# Patient Record
Sex: MALE | Race: WHITE | Employment: FULL TIME | URBAN - METROPOLITAN AREA
[De-identification: names, ages, dates, MRNs, and addresses within clinical notes are randomized per-mention and may not be internally consistent; named-entity substitution may affect disease eponyms.]

---

## 2017-01-27 RX ORDER — LOSARTAN POTASSIUM AND HYDROCHLOROTHIAZIDE 25; 100 MG/1; MG/1
TABLET ORAL
Qty: 90 TABLET | Refills: 0 | Status: SHIPPED | OUTPATIENT
Start: 2017-01-27 | End: 2017-05-10 | Stop reason: SDUPTHER

## 2017-02-09 ENCOUNTER — TELEPHONE (OUTPATIENT)
Dept: FAMILY MEDICINE CLINIC | Age: 57
End: 2017-02-09

## 2017-02-09 DIAGNOSIS — R73.03 PRE-DIABETES: ICD-10-CM

## 2017-02-09 DIAGNOSIS — I10 ESSENTIAL HYPERTENSION: ICD-10-CM

## 2017-02-09 DIAGNOSIS — E78.00 PURE HYPERCHOLESTEROLEMIA: Primary | ICD-10-CM

## 2017-02-27 DIAGNOSIS — R73.03 PRE-DIABETES: ICD-10-CM

## 2017-02-27 DIAGNOSIS — I10 ESSENTIAL HYPERTENSION: ICD-10-CM

## 2017-02-27 DIAGNOSIS — E78.00 PURE HYPERCHOLESTEROLEMIA: ICD-10-CM

## 2017-02-27 LAB
ANION GAP SERPL CALCULATED.3IONS-SCNC: 14 MMOL/L (ref 3–16)
BUN BLDV-MCNC: 20 MG/DL (ref 7–20)
CALCIUM SERPL-MCNC: 8.9 MG/DL (ref 8.3–10.6)
CHLORIDE BLD-SCNC: 96 MMOL/L (ref 99–110)
CO2: 26 MMOL/L (ref 21–32)
CREAT SERPL-MCNC: 1 MG/DL (ref 0.9–1.3)
GFR AFRICAN AMERICAN: >60
GFR NON-AFRICAN AMERICAN: >60
GLUCOSE BLD-MCNC: 115 MG/DL (ref 70–99)
POTASSIUM SERPL-SCNC: 4.3 MMOL/L (ref 3.5–5.1)
SODIUM BLD-SCNC: 136 MMOL/L (ref 136–145)
TSH REFLEX FT4: 1.57 UIU/ML (ref 0.27–4.2)

## 2017-02-28 LAB
ESTIMATED AVERAGE GLUCOSE: 105.4 MG/DL
HBA1C MFR BLD: 5.3 %

## 2017-03-30 RX ORDER — AMLODIPINE BESYLATE 5 MG/1
TABLET ORAL
Qty: 90 TABLET | Refills: 1 | Status: SHIPPED | OUTPATIENT
Start: 2017-03-30 | End: 2017-09-27 | Stop reason: SDUPTHER

## 2017-04-04 RX ORDER — NAPROXEN 500 MG/1
500 TABLET ORAL 2 TIMES DAILY WITH MEALS
Qty: 180 TABLET | Refills: 1 | Status: SHIPPED | OUTPATIENT
Start: 2017-04-04 | End: 2017-08-11 | Stop reason: ALTCHOICE

## 2017-05-10 RX ORDER — LOSARTAN POTASSIUM AND HYDROCHLOROTHIAZIDE 25; 100 MG/1; MG/1
TABLET ORAL
Qty: 90 TABLET | Refills: 1 | Status: SHIPPED | OUTPATIENT
Start: 2017-05-10 | End: 2017-11-06 | Stop reason: SDUPTHER

## 2017-06-30 DIAGNOSIS — M25.551 RIGHT HIP PAIN: Primary | ICD-10-CM

## 2017-07-09 DIAGNOSIS — E66.01 MORBID OBESITY DUE TO EXCESS CALORIES (HCC): ICD-10-CM

## 2017-07-09 DIAGNOSIS — E78.00 PURE HYPERCHOLESTEROLEMIA: Primary | ICD-10-CM

## 2017-07-09 DIAGNOSIS — R73.03 PRE-DIABETES: ICD-10-CM

## 2017-07-09 DIAGNOSIS — I10 ESSENTIAL HYPERTENSION: ICD-10-CM

## 2017-07-09 DIAGNOSIS — E03.8 SUBCLINICAL HYPOTHYROIDISM: ICD-10-CM

## 2017-08-11 ENCOUNTER — OFFICE VISIT (OUTPATIENT)
Dept: FAMILY MEDICINE CLINIC | Age: 57
End: 2017-08-11

## 2017-08-11 ENCOUNTER — CARE COORDINATION (OUTPATIENT)
Dept: CARE COORDINATION | Age: 57
End: 2017-08-11

## 2017-08-11 VITALS
HEART RATE: 67 BPM | WEIGHT: 315 LBS | TEMPERATURE: 98.7 F | HEIGHT: 71 IN | DIASTOLIC BLOOD PRESSURE: 92 MMHG | OXYGEN SATURATION: 98 % | RESPIRATION RATE: 16 BRPM | BODY MASS INDEX: 44.1 KG/M2 | SYSTOLIC BLOOD PRESSURE: 142 MMHG

## 2017-08-11 DIAGNOSIS — Z01.818 PRE-OP EXAM: Primary | ICD-10-CM

## 2017-08-11 DIAGNOSIS — E78.00 PURE HYPERCHOLESTEROLEMIA: ICD-10-CM

## 2017-08-11 DIAGNOSIS — E03.8 SUBCLINICAL HYPOTHYROIDISM: ICD-10-CM

## 2017-08-11 DIAGNOSIS — E66.01 MORBID OBESITY DUE TO EXCESS CALORIES (HCC): ICD-10-CM

## 2017-08-11 DIAGNOSIS — R73.01 IMPAIRED FASTING GLUCOSE: ICD-10-CM

## 2017-08-11 DIAGNOSIS — I10 ESSENTIAL HYPERTENSION: ICD-10-CM

## 2017-08-11 DIAGNOSIS — R73.03 PRE-DIABETES: ICD-10-CM

## 2017-08-11 LAB — HBA1C MFR BLD: 5.5 %

## 2017-08-11 PROCEDURE — 93000 ELECTROCARDIOGRAM COMPLETE: CPT | Performed by: FAMILY MEDICINE

## 2017-08-11 PROCEDURE — 83036 HEMOGLOBIN GLYCOSYLATED A1C: CPT | Performed by: FAMILY MEDICINE

## 2017-08-11 PROCEDURE — 99242 OFF/OP CONSLTJ NEW/EST SF 20: CPT | Performed by: FAMILY MEDICINE

## 2017-08-11 RX ORDER — TRAMADOL HYDROCHLORIDE 50 MG/1
TABLET ORAL
COMMUNITY
Start: 2017-07-27 | End: 2018-11-21

## 2017-08-11 ASSESSMENT — PATIENT HEALTH QUESTIONNAIRE - PHQ9
SUM OF ALL RESPONSES TO PHQ9 QUESTIONS 1 & 2: 0
2. FEELING DOWN, DEPRESSED OR HOPELESS: 0
SUM OF ALL RESPONSES TO PHQ QUESTIONS 1-9: 0
1. LITTLE INTEREST OR PLEASURE IN DOING THINGS: 0

## 2017-09-27 RX ORDER — AMLODIPINE BESYLATE 5 MG/1
TABLET ORAL
Qty: 90 TABLET | Refills: 1 | Status: SHIPPED | OUTPATIENT
Start: 2017-09-27 | End: 2018-03-26 | Stop reason: SDUPTHER

## 2017-11-06 RX ORDER — LOSARTAN POTASSIUM AND HYDROCHLOROTHIAZIDE 25; 100 MG/1; MG/1
TABLET ORAL
Qty: 90 TABLET | Refills: 1 | Status: SHIPPED | OUTPATIENT
Start: 2017-11-06 | End: 2018-05-05 | Stop reason: SDUPTHER

## 2017-11-06 NOTE — TELEPHONE ENCOUNTER
Last OV  8/11/17    Last refill   5/10/17    # 90      R 1    Lab Results   Component Value Date     02/27/2017    K 4.3 02/27/2017    CL 96 (L) 02/27/2017    CO2 26 02/27/2017    BUN 20 02/27/2017    CREATININE 1.0 02/27/2017    GLUCOSE 115 (H) 02/27/2017    CALCIUM 8.9 02/27/2017    PROT 6.9 10/21/2016    LABALBU 4.6 10/21/2016    BILITOT 1.1 (H) 10/21/2016    ALKPHOS 59 10/21/2016    AST 19 10/21/2016    ALT 30 10/21/2016    LABGLOM >60 02/27/2017    GFRAA >60 02/27/2017    AGRATIO 2.0 10/21/2016    GLOB 2.3 10/21/2016

## 2017-12-04 DIAGNOSIS — E03.8 SUBCLINICAL HYPOTHYROIDISM: ICD-10-CM

## 2017-12-04 RX ORDER — LEVOTHYROXINE SODIUM 0.07 MG/1
TABLET ORAL
Qty: 90 TABLET | Refills: 3 | Status: SHIPPED | OUTPATIENT
Start: 2017-12-04 | End: 2018-11-21 | Stop reason: SDUPTHER

## 2017-12-04 NOTE — TELEPHONE ENCOUNTER
Last OV  08/11/2017 pre op exam   Last Refill 11/23/2016 #90 3 refills  Lab Results   Component Value Date    TSHFT4 1.57 02/27/2017    TSH 4.46 (H) 05/14/2014

## 2018-02-06 ENCOUNTER — TELEPHONE (OUTPATIENT)
Dept: FAMILY MEDICINE CLINIC | Age: 58
End: 2018-02-06

## 2018-02-06 DIAGNOSIS — L98.9 SKIN LESIONS: Primary | ICD-10-CM

## 2018-02-07 NOTE — TELEPHONE ENCOUNTER
[phone] (home) [phone] (work)  [facility] for patient to return our call.

## 2018-03-26 RX ORDER — AMLODIPINE BESYLATE 5 MG/1
TABLET ORAL
Qty: 90 TABLET | Refills: 2 | Status: SHIPPED | OUTPATIENT
Start: 2018-03-26 | End: 2018-11-21 | Stop reason: SDUPTHER

## 2018-05-07 RX ORDER — LOSARTAN POTASSIUM AND HYDROCHLOROTHIAZIDE 25; 100 MG/1; MG/1
TABLET ORAL
Qty: 90 TABLET | Refills: 0 | Status: SHIPPED | OUTPATIENT
Start: 2018-05-07 | End: 2018-08-04 | Stop reason: SDUPTHER

## 2018-08-04 RX ORDER — LOSARTAN POTASSIUM AND HYDROCHLOROTHIAZIDE 25; 100 MG/1; MG/1
TABLET ORAL
Qty: 90 TABLET | Refills: 0 | Status: SHIPPED | OUTPATIENT
Start: 2018-08-04 | End: 2018-11-04 | Stop reason: SDUPTHER

## 2018-08-04 NOTE — TELEPHONE ENCOUNTER
Last refill: 05/07/2018, #90, 0 refills.      Lab Results   Component Value Date     02/27/2017    K 4.3 02/27/2017    CL 96 (L) 02/27/2017    CO2 26 02/27/2017    BUN 20 02/27/2017    CREATININE 1.0 02/27/2017    GLUCOSE 115 (H) 02/27/2017    CALCIUM 8.9 02/27/2017    PROT 6.9 10/21/2016    LABALBU 4.6 10/21/2016    BILITOT 1.1 (H) 10/21/2016    ALKPHOS 59 10/21/2016    AST 19 10/21/2016    ALT 30 10/21/2016    LABGLOM >60 02/27/2017    GFRAA >60 02/27/2017    AGRATIO 2.0 10/21/2016    GLOB 2.3 10/21/2016

## 2018-08-04 NOTE — TELEPHONE ENCOUNTER
Medication and Quantity requested:    losartan-hydrochlorothiazide (HYZAAR) 100-25 MG - qty 90      Last Visit  08/11/17 - Dr Aurora León and phone number updated in EPIC:  yes

## 2018-08-21 NOTE — TELEPHONE ENCOUNTER
[phone] (home) [phone] (work)  Patient informed that he is due for OV. Stated that he will call us back to scheduled.

## 2018-11-05 RX ORDER — LOSARTAN POTASSIUM AND HYDROCHLOROTHIAZIDE 25; 100 MG/1; MG/1
TABLET ORAL
Qty: 90 TABLET | Refills: 3 | Status: SHIPPED | OUTPATIENT
Start: 2018-11-05 | End: 2018-11-21 | Stop reason: SDUPTHER

## 2018-11-21 ENCOUNTER — OFFICE VISIT (OUTPATIENT)
Dept: FAMILY MEDICINE CLINIC | Age: 58
End: 2018-11-21
Payer: COMMERCIAL

## 2018-11-21 VITALS
OXYGEN SATURATION: 99 % | DIASTOLIC BLOOD PRESSURE: 90 MMHG | HEIGHT: 71 IN | SYSTOLIC BLOOD PRESSURE: 140 MMHG | WEIGHT: 315 LBS | BODY MASS INDEX: 44.1 KG/M2 | HEART RATE: 74 BPM

## 2018-11-21 DIAGNOSIS — E78.00 PURE HYPERCHOLESTEROLEMIA: ICD-10-CM

## 2018-11-21 DIAGNOSIS — E03.8 SUBCLINICAL HYPOTHYROIDISM: ICD-10-CM

## 2018-11-21 DIAGNOSIS — E66.01 MORBID OBESITY (HCC): ICD-10-CM

## 2018-11-21 DIAGNOSIS — Z00.00 WELL ADULT EXAM: Primary | ICD-10-CM

## 2018-11-21 DIAGNOSIS — I10 ESSENTIAL HYPERTENSION: ICD-10-CM

## 2018-11-21 DIAGNOSIS — Z00.00 WELL ADULT EXAM: ICD-10-CM

## 2018-11-21 LAB
BILIRUBIN, POC: NEGATIVE
BLOOD URINE, POC: NEGATIVE
CLARITY, POC: CLEAR
COLOR, POC: YELLOW
GLUCOSE URINE, POC: 100
KETONES, POC: NEGATIVE
LEUKOCYTE EST, POC: NEGATIVE
NITRITE, POC: NEGATIVE
PH, POC: 5.5
PROTEIN, POC: NEGATIVE
SPECIFIC GRAVITY, POC: 1.01
UROBILINOGEN, POC: 0.2

## 2018-11-21 PROCEDURE — 81002 URINALYSIS NONAUTO W/O SCOPE: CPT | Performed by: FAMILY MEDICINE

## 2018-11-21 PROCEDURE — 90471 IMMUNIZATION ADMIN: CPT | Performed by: FAMILY MEDICINE

## 2018-11-21 PROCEDURE — 99396 PREV VISIT EST AGE 40-64: CPT | Performed by: FAMILY MEDICINE

## 2018-11-21 PROCEDURE — 90682 RIV4 VACC RECOMBINANT DNA IM: CPT | Performed by: FAMILY MEDICINE

## 2018-11-21 RX ORDER — LEVOTHYROXINE SODIUM 0.07 MG/1
TABLET ORAL
Qty: 90 TABLET | Refills: 3 | Status: SHIPPED | OUTPATIENT
Start: 2018-11-21 | End: 2019-10-24 | Stop reason: SDUPTHER

## 2018-11-21 RX ORDER — LOSARTAN POTASSIUM AND HYDROCHLOROTHIAZIDE 25; 100 MG/1; MG/1
TABLET ORAL
Qty: 90 TABLET | Refills: 3 | Status: SHIPPED | OUTPATIENT
Start: 2018-11-21 | End: 2020-01-06

## 2018-11-21 RX ORDER — AMLODIPINE BESYLATE 5 MG/1
TABLET ORAL
Qty: 90 TABLET | Refills: 2 | Status: SHIPPED | OUTPATIENT
Start: 2018-11-21 | End: 2019-11-20 | Stop reason: SDUPTHER

## 2018-11-21 RX ORDER — SILDENAFIL 100 MG/1
100 TABLET, FILM COATED ORAL PRN
Qty: 5 TABLET | Refills: 3 | Status: SHIPPED | OUTPATIENT
Start: 2018-11-21 | End: 2019-12-26

## 2018-11-21 RX ORDER — KETOCONAZOLE 20 MG/G
CREAM TOPICAL
Qty: 100 G | Refills: 5 | Status: SHIPPED | OUTPATIENT
Start: 2018-11-21 | End: 2020-02-13

## 2018-11-21 ASSESSMENT — PATIENT HEALTH QUESTIONNAIRE - PHQ9
2. FEELING DOWN, DEPRESSED OR HOPELESS: 0
SUM OF ALL RESPONSES TO PHQ9 QUESTIONS 1 & 2: 0
SUM OF ALL RESPONSES TO PHQ QUESTIONS 1-9: 0
SUM OF ALL RESPONSES TO PHQ QUESTIONS 1-9: 0
1. LITTLE INTEREST OR PLEASURE IN DOING THINGS: 0

## 2018-11-21 NOTE — PROGRESS NOTES
Chief Complaint:   Landry Brito is a 66 y.o. male who presents for complete physical examination    History of Present Illness:    Here for cpe  Sees derm-given ketoconazole for tinea cruris and tinea corporis and would like a refill  Feet tingle at time  Ankle/shin with brown pigmentation  Weight is up, does not exercise, stays busy with work however, when he stands for long periods of time he'll get numbness in his left thigh when he systolic goes away, he assumes this is due to nerve entrapment from his prior back issues  Has a long list with numerous minor medical issues to review-including erectile dysfunction, frequent nosebleeds from the right nostril, daily use of Afrin for nasal congestion  Patient Active Problem List   Diagnosis    Hyperlipidemia    Obesity    HTN (hypertension)    Pre-diabetes    Subclinical hypothyroidism    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    Herniated lumbar intervertebral disc     Past Medical History:   Diagnosis Date    Bone marrow donor     x 2    Hyperlipidemia     Hypertension     Lumbar herniated disc     x 3    Obesity        Past Surgical History:   Procedure Laterality Date    COLONOSCOPY  01/16/12    COLONOSCOPY  10/14/14    KNEE SURGERY      OTHER SURGICAL HISTORY      epidural injection x 2       Current Outpatient Prescriptions   Medication Sig Dispense Refill    aspirin 81 MG tablet Take 81 mg by mouth daily      losartan-hydrochlorothiazide (HYZAAR) 100-25 MG per tablet TAKE 1 TABLET DAILY 90 tablet 3    amLODIPine (NORVASC) 5 MG tablet TAKE 1 TABLET NIGHTLY 90 tablet 2    levothyroxine (SYNTHROID) 75 MCG tablet TAKE 1 TABLET DAILY 90 tablet 3    UNABLE TO FIND Take 1 tablet by mouth daily Indications: Tumeric Complex       No current facility-administered medications for this visit.       No Known Allergies    Social History     Social History    Marital status:      Spouse name: N/A    Number of children: intact. Ears: External ears normal. Canals clear. TM's clear bilaterally. Hearing normal to finger rub. Nose/Sinuses: Nares normal. Septum midline. Mucosa normal. No drainage or sinus tenderness. Oropharynx: Lips, mucosa, and tongue normal. Teeth and gums normal. Oropharynx clear with no exudate seen. Neck: Neck supple, and symmetric. No adenopathy. Thyroid symmetric, normal size, without nodule. Trachea is midline. No bruits. Back: Back symmetric, no curvature. ROM normal. No CVA tenderness. Lungs: Good diaphragmatic excursion. Lungs clear to auscultation bilaterally. No retractions or use of accessory muscles. Heart: PMI is not displaced, and no thrill noted. Regular rate and rhythm, with no rub, murmur or gallop noted. Abdomen: Abdomen soft, non-tender. BS normal. No masses, organomegaly. No hernia noted. Extremities: Extremities normal. No deformities, edema, or skin discoloration. No cyanosis or clubbing noted to the nails. Lymph: No lymphadenopathy of the neck or supraclavicular regions. Musculoskeletal: Spine ROM normal. Muscular strength intact. some veinous insufficieny  Peripheral pulses: radial=4/4, dorsalis pedis=4/4,  Neuro: Cranial nerves intact, Gait normal. Reflexes normal and symmetric, with no pathologic reflex noted. No focal weakness. Normal sensation to light touch. Genitalia: Penis normal. No urethral discharge. Scrotum normal to palpation. Marked tinea cruris this chronic bilateral thighs and scrotum   Rectal: Deferred    Results for orders placed or performed in visit on 08/11/17   POCT glycosylated hemoglobin (Hb A1C)   Result Value Ref Range    Hemoglobin A1C 5.5 %     All labs reviewed with patient. ASSESSMENT:   See encounter diagnoses  Encounter Diagnoses   Name Primary?     Subclinical hypothyroidism     Well adult exam Yes    Pure hypercholesterolemia     Essential hypertension     Morbid obesity (HCC)    Tinea cruris  Tinea corporis  veinous insuff  Rhinitis

## 2018-11-22 LAB
A/G RATIO: 2 (ref 1.1–2.2)
ALBUMIN SERPL-MCNC: 4.7 G/DL (ref 3.4–5)
ALP BLD-CCNC: 62 U/L (ref 40–129)
ALT SERPL-CCNC: 25 U/L (ref 10–40)
ANION GAP SERPL CALCULATED.3IONS-SCNC: 13 MMOL/L (ref 3–16)
AST SERPL-CCNC: 16 U/L (ref 15–37)
BASOPHILS ABSOLUTE: 0 K/UL (ref 0–0.2)
BASOPHILS RELATIVE PERCENT: 0.4 %
BILIRUB SERPL-MCNC: 1 MG/DL (ref 0–1)
BUN BLDV-MCNC: 17 MG/DL (ref 7–20)
CALCIUM SERPL-MCNC: 9.8 MG/DL (ref 8.3–10.6)
CHLORIDE BLD-SCNC: 100 MMOL/L (ref 99–110)
CHOLESTEROL, TOTAL: 179 MG/DL (ref 0–199)
CO2: 28 MMOL/L (ref 21–32)
CREAT SERPL-MCNC: 1.1 MG/DL (ref 0.9–1.3)
EOSINOPHILS ABSOLUTE: 0.1 K/UL (ref 0–0.6)
EOSINOPHILS RELATIVE PERCENT: 1.4 %
ESTIMATED AVERAGE GLUCOSE: 114 MG/DL
GFR AFRICAN AMERICAN: >60
GFR NON-AFRICAN AMERICAN: >60
GLOBULIN: 2.4 G/DL
GLUCOSE BLD-MCNC: 105 MG/DL (ref 70–99)
HBA1C MFR BLD: 5.6 %
HCT VFR BLD CALC: 48.1 % (ref 40.5–52.5)
HDLC SERPL-MCNC: 43 MG/DL (ref 40–60)
HEMOGLOBIN: 16.2 G/DL (ref 13.5–17.5)
LDL CHOLESTEROL CALCULATED: 108 MG/DL
LYMPHOCYTES ABSOLUTE: 1.1 K/UL (ref 1–5.1)
LYMPHOCYTES RELATIVE PERCENT: 13.5 %
MCH RBC QN AUTO: 31 PG (ref 26–34)
MCHC RBC AUTO-ENTMCNC: 33.7 G/DL (ref 31–36)
MCV RBC AUTO: 91.9 FL (ref 80–100)
MONOCYTES ABSOLUTE: 0.5 K/UL (ref 0–1.3)
MONOCYTES RELATIVE PERCENT: 6.4 %
NEUTROPHILS ABSOLUTE: 6.2 K/UL (ref 1.7–7.7)
NEUTROPHILS RELATIVE PERCENT: 78.3 %
PDW BLD-RTO: 13.8 % (ref 12.4–15.4)
PLATELET # BLD: 168 K/UL (ref 135–450)
PMV BLD AUTO: 8.3 FL (ref 5–10.5)
POTASSIUM SERPL-SCNC: 4.4 MMOL/L (ref 3.5–5.1)
PROSTATE SPECIFIC ANTIGEN: 0.88 NG/ML (ref 0–4)
RBC # BLD: 5.24 M/UL (ref 4.2–5.9)
SODIUM BLD-SCNC: 141 MMOL/L (ref 136–145)
TOTAL PROTEIN: 7.1 G/DL (ref 6.4–8.2)
TRIGL SERPL-MCNC: 139 MG/DL (ref 0–150)
TSH REFLEX: 1.72 UIU/ML (ref 0.27–4.2)
VLDLC SERPL CALC-MCNC: 28 MG/DL
WBC # BLD: 7.9 K/UL (ref 4–11)

## 2019-03-21 RX ORDER — CEPHALEXIN 500 MG/1
500 CAPSULE ORAL 3 TIMES DAILY
Qty: 30 CAPSULE | Refills: 0 | Status: SHIPPED | OUTPATIENT
Start: 2019-03-21 | End: 2021-02-09 | Stop reason: ALTCHOICE

## 2019-09-05 ENCOUNTER — HOSPITAL ENCOUNTER (EMERGENCY)
Age: 59
Discharge: HOME | End: 2019-09-05
Attending: EMERGENCY MEDICINE
Payer: COMMERCIAL

## 2019-09-05 ENCOUNTER — TELEPHONE (OUTPATIENT)
Dept: FAMILY MEDICINE CLINIC | Age: 59
End: 2019-09-05

## 2019-09-05 ENCOUNTER — APPOINTMENT (OUTPATIENT)
Dept: CT IMAGING | Age: 59
End: 2019-09-05
Payer: COMMERCIAL

## 2019-09-05 VITALS
SYSTOLIC BLOOD PRESSURE: 146 MMHG | TEMPERATURE: 97.5 F | DIASTOLIC BLOOD PRESSURE: 82 MMHG | OXYGEN SATURATION: 99 % | RESPIRATION RATE: 17 BRPM | HEIGHT: 72 IN | BODY MASS INDEX: 42.66 KG/M2 | HEART RATE: 67 BPM | WEIGHT: 315 LBS

## 2019-09-05 DIAGNOSIS — N20.1 URETEROLITHIASIS: ICD-10-CM

## 2019-09-05 DIAGNOSIS — N13.30 HYDRONEPHROSIS, UNSPECIFIED HYDRONEPHROSIS TYPE: Primary | ICD-10-CM

## 2019-09-05 DIAGNOSIS — N39.0 URINARY TRACT INFECTION IN MALE: ICD-10-CM

## 2019-09-05 DIAGNOSIS — R10.9 FLANK PAIN: ICD-10-CM

## 2019-09-05 LAB
A/G RATIO: 1.5 (ref 1.1–2.2)
ALBUMIN SERPL-MCNC: 4.4 G/DL (ref 3.4–5)
ALP BLD-CCNC: 56 U/L (ref 40–129)
ALT SERPL-CCNC: 29 U/L (ref 10–40)
ANION GAP SERPL CALCULATED.3IONS-SCNC: 10 MMOL/L (ref 3–16)
AST SERPL-CCNC: 18 U/L (ref 15–37)
BASOPHILS ABSOLUTE: 0 K/UL (ref 0–0.2)
BASOPHILS RELATIVE PERCENT: 0.4 %
BILIRUB SERPL-MCNC: 0.8 MG/DL (ref 0–1)
BILIRUBIN URINE: NEGATIVE
BLOOD, URINE: ABNORMAL
BUN BLDV-MCNC: 23 MG/DL (ref 7–20)
CALCIUM SERPL-MCNC: 10.1 MG/DL (ref 8.3–10.6)
CHLORIDE BLD-SCNC: 100 MMOL/L (ref 99–110)
CLARITY: ABNORMAL
CO2: 26 MMOL/L (ref 21–32)
COLOR: YELLOW
CREAT SERPL-MCNC: 1.3 MG/DL (ref 0.9–1.3)
EOSINOPHILS ABSOLUTE: 0 K/UL (ref 0–0.6)
EOSINOPHILS RELATIVE PERCENT: 0.2 %
EPITHELIAL CELLS, UA: 0 /HPF (ref 0–5)
GFR AFRICAN AMERICAN: >60
GFR NON-AFRICAN AMERICAN: 57
GLOBULIN: 3 G/DL
GLUCOSE BLD-MCNC: 160 MG/DL (ref 70–99)
GLUCOSE URINE: 100 MG/DL
HCT VFR BLD CALC: 46.4 % (ref 40.5–52.5)
HEMOGLOBIN: 16 G/DL (ref 13.5–17.5)
HYALINE CASTS: 3 /LPF (ref 0–8)
KETONES, URINE: NEGATIVE MG/DL
LACTIC ACID, SEPSIS: 2.1 MMOL/L (ref 0.4–1.9)
LACTIC ACID, SEPSIS: 2.3 MMOL/L (ref 0.4–1.9)
LEUKOCYTE ESTERASE, URINE: NEGATIVE
LYMPHOCYTES ABSOLUTE: 0.5 K/UL (ref 1–5.1)
LYMPHOCYTES RELATIVE PERCENT: 6.2 %
MCH RBC QN AUTO: 31.5 PG (ref 26–34)
MCHC RBC AUTO-ENTMCNC: 34.6 G/DL (ref 31–36)
MCV RBC AUTO: 91.2 FL (ref 80–100)
MICROSCOPIC EXAMINATION: YES
MONOCYTES ABSOLUTE: 0.4 K/UL (ref 0–1.3)
MONOCYTES RELATIVE PERCENT: 5 %
NEUTROPHILS ABSOLUTE: 7.7 K/UL (ref 1.7–7.7)
NEUTROPHILS RELATIVE PERCENT: 88.2 %
NITRITE, URINE: NEGATIVE
PDW BLD-RTO: 13 % (ref 12.4–15.4)
PH UA: 5 (ref 5–8)
PLATELET # BLD: 181 K/UL (ref 135–450)
PMV BLD AUTO: 7.3 FL (ref 5–10.5)
POTASSIUM SERPL-SCNC: 4.2 MMOL/L (ref 3.5–5.1)
PROTEIN UA: 100 MG/DL
RBC # BLD: 5.08 M/UL (ref 4.2–5.9)
RBC UA: 40 /HPF (ref 0–4)
SODIUM BLD-SCNC: 136 MMOL/L (ref 136–145)
SPECIFIC GRAVITY UA: 1.03 (ref 1–1.03)
TOTAL PROTEIN: 7.4 G/DL (ref 6.4–8.2)
URINE REFLEX TO CULTURE: ABNORMAL
URINE TYPE: ABNORMAL
UROBILINOGEN, URINE: 0.2 E.U./DL
WBC # BLD: 8.7 K/UL (ref 4–11)
WBC UA: 2 /HPF (ref 0–5)

## 2019-09-05 PROCEDURE — 96374 THER/PROPH/DIAG INJ IV PUSH: CPT

## 2019-09-05 PROCEDURE — 87040 BLOOD CULTURE FOR BACTERIA: CPT

## 2019-09-05 PROCEDURE — 6360000002 HC RX W HCPCS: Performed by: NURSE PRACTITIONER

## 2019-09-05 PROCEDURE — 74176 CT ABD & PELVIS W/O CONTRAST: CPT

## 2019-09-05 PROCEDURE — 6370000000 HC RX 637 (ALT 250 FOR IP): Performed by: NURSE PRACTITIONER

## 2019-09-05 PROCEDURE — 80053 COMPREHEN METABOLIC PANEL: CPT

## 2019-09-05 PROCEDURE — 81001 URINALYSIS AUTO W/SCOPE: CPT

## 2019-09-05 PROCEDURE — 96361 HYDRATE IV INFUSION ADD-ON: CPT

## 2019-09-05 PROCEDURE — 83605 ASSAY OF LACTIC ACID: CPT

## 2019-09-05 PROCEDURE — 2580000003 HC RX 258: Performed by: NURSE PRACTITIONER

## 2019-09-05 PROCEDURE — 85025 COMPLETE CBC W/AUTO DIFF WBC: CPT

## 2019-09-05 PROCEDURE — 99284 EMERGENCY DEPT VISIT MOD MDM: CPT

## 2019-09-05 PROCEDURE — 36415 COLL VENOUS BLD VENIPUNCTURE: CPT

## 2019-09-05 RX ORDER — 0.9 % SODIUM CHLORIDE 0.9 %
1000 INTRAVENOUS SOLUTION INTRAVENOUS ONCE
Status: COMPLETED | OUTPATIENT
Start: 2019-09-05 | End: 2019-09-05

## 2019-09-05 RX ORDER — KETOROLAC TROMETHAMINE 30 MG/ML
30 INJECTION, SOLUTION INTRAMUSCULAR; INTRAVENOUS ONCE
Status: COMPLETED | OUTPATIENT
Start: 2019-09-05 | End: 2019-09-05

## 2019-09-05 RX ORDER — CIPROFLOXACIN 500 MG/1
500 TABLET, FILM COATED ORAL 2 TIMES DAILY
Qty: 20 TABLET | Refills: 0 | Status: SHIPPED | OUTPATIENT
Start: 2019-09-05 | End: 2019-09-15

## 2019-09-05 RX ORDER — HYDROCODONE BITARTRATE AND ACETAMINOPHEN 5; 325 MG/1; MG/1
1 TABLET ORAL EVERY 6 HOURS PRN
Qty: 10 TABLET | Refills: 0 | Status: SHIPPED | OUTPATIENT
Start: 2019-09-05 | End: 2019-09-08

## 2019-09-05 RX ORDER — PHENAZOPYRIDINE HYDROCHLORIDE 100 MG/1
200 TABLET, FILM COATED ORAL ONCE
Status: COMPLETED | OUTPATIENT
Start: 2019-09-05 | End: 2019-09-05

## 2019-09-05 RX ORDER — PHENAZOPYRIDINE HYDROCHLORIDE 200 MG/1
200 TABLET, FILM COATED ORAL 3 TIMES DAILY PRN
Qty: 10 TABLET | Refills: 0 | Status: SHIPPED | OUTPATIENT
Start: 2019-09-05 | End: 2019-09-08

## 2019-09-05 RX ORDER — TAMSULOSIN HYDROCHLORIDE 0.4 MG/1
0.4 CAPSULE ORAL DAILY
Qty: 5 CAPSULE | Refills: 0 | Status: SHIPPED | OUTPATIENT
Start: 2019-09-05 | End: 2020-02-13

## 2019-09-05 RX ORDER — BACITRACIN, NEOMYCIN, POLYMYXIN B 400; 3.5; 5 [USP'U]/G; MG/G; [USP'U]/G
OINTMENT TOPICAL
Status: DISCONTINUED
Start: 2019-09-05 | End: 2019-09-05 | Stop reason: HOSPADM

## 2019-09-05 RX ADMIN — KETOROLAC TROMETHAMINE 30 MG: 30 INJECTION, SOLUTION INTRAMUSCULAR at 11:25

## 2019-09-05 RX ADMIN — SODIUM CHLORIDE 1000 ML: 9 INJECTION, SOLUTION INTRAVENOUS at 11:25

## 2019-09-05 RX ADMIN — PHENAZOPYRIDINE 200 MG: 100 TABLET ORAL at 11:25

## 2019-09-05 ASSESSMENT — PAIN SCALES - GENERAL
PAINLEVEL_OUTOF10: 0
PAINLEVEL_OUTOF10: 8

## 2019-09-05 ASSESSMENT — ENCOUNTER SYMPTOMS
CHEST TIGHTNESS: 0
VOMITING: 0
NAUSEA: 0
SHORTNESS OF BREATH: 0
ABDOMINAL PAIN: 0
DIARRHEA: 0

## 2019-09-05 ASSESSMENT — PAIN DESCRIPTION - LOCATION: LOCATION: BACK

## 2019-09-05 ASSESSMENT — PAIN DESCRIPTION - PAIN TYPE: TYPE: ACUTE PAIN

## 2019-09-05 NOTE — ED NOTES
Bed: 19  Expected date:   Expected time:   Means of arrival:   Comments:  [address] [id number], [facility], RN  09/05/19 3748

## 2019-09-05 NOTE — ED PROVIDER NOTES
and time. Skin: Skin is warm and dry. He is not diaphoretic. No pallor. Psychiatric: He has a normal mood and affect. His behavior is normal.   Nursing note and vitals reviewed.       DIAGNOSTIC RESULTS   LABS:    Labs Reviewed   CBC WITH AUTO DIFFERENTIAL - Abnormal; Notable for the following components:       Result Value    Lymphocytes Absolute 0.5 (*)     All other components within normal limits    Narrative:     Performed at:  [facility] [address]   Phone [phone]   COMPREHENSIVE METABOLIC PANEL - Abnormal; Notable for the following components:    Glucose 160 (*)     BUN 23 (*)     GFR Non- 57 (*)     All other components within normal limits    Narrative:     Performed at:  [facility] [address]   Phone [phone]   LACTATE, SEPSIS - Abnormal; Notable for the following components:    Lactic Acid, Sepsis 2.3 (*)     All other components within normal limits    Narrative:     Performed at:  [facility] [address]   Phone [phone]   LACTATE, SEPSIS - Abnormal; Notable for the following components:    Lactic Acid, Sepsis 2.1 (*)     All other components within normal limits    Narrative:     Performed at:  [facility] [address]   Phone [phone]   URINE RT REFLEX TO CULTURE - Abnormal; Notable for the following components:    Clarity, UA TURBID (*)     Glucose, Ur 100 (*)     Blood, Urine LARGE (*)     Protein,  (*)     All other components within normal limits    Narrative:     Performed at:  [facility] [address]   Phone [phone]   MICROSCOPIC URINALYSIS - Abnormal; Notable for the following components:    RBC, UA 40 (*)     All other components within normal limits    Narrative:     Performed at:  [facility]  [address], [address]   Phone [phone]   CULTURE BLOOD #2   CULTURE BLOOD #1       All other labs were within normal range or not returned as of this dictation. EKG: All EKG's are interpreted by the Emergency Department Physician in the absence of a cardiologist.  Please see their note for interpretation of EKG. RADIOLOGY:   Non-plain film images such as CT, Ultrasound and MRI are read by the radiologist. Plain radiographic images are visualized andpreliminarily interpreted by the  ED Provider with the below findings:        Interpretation Formerly named [facility] Radiologist below, if available at the time of this note:    CT ABDOMEN PELVIS WO CONTRAST Additional Contrast? None   Final Result   Punctate stone in the distal right ureter. Mild right hydronephrosis. No results found. PROCEDURES   Unless otherwise noted below, none     Procedures    CRITICAL CARE TIME   N/A    CONSULTS:  None      EMERGENCY DEPARTMENT COURSE and DIFFERENTIAL DIAGNOSIS/MDM:   Vitals:    Vitals:    09/05/19 0959 09/05/19 1346   BP: (!) 228/108 (!) 146/82   Pulse: 71 67   Resp: 17    Temp: 97.5 °F (36.4 °C)    SpO2: 99%    Weight: (!) 390 lb (176.9 kg)    Height: 6' (1.829 m)        Patient was given thefollowing medications:  Medications   neomycin-bacitracin-polymyxin (NEOSPORIN) 400-5-5000 ointment (has no administration in time range)   phenazopyridine (PYRIDIUM) tablet 200 mg (200 mg Oral Given 9/5/19 1125)   ketorolac (TORADOL) injection 30 mg (30 mg Intravenous Given 9/5/19 1125)   0.9 % sodium chloride bolus (0 mLs Intravenous Stopped 9/5/19 1346)     Briefly, this is a 64year old male that  presents the emergency department with complaint of urinary hesitancy frequency and urgency with painful urination, right-sided flank pain since last night. Reports that symptoms have waxed and waned.   Denies history of previous

## 2019-09-05 NOTE — ED NOTES
Pt resting in bed. wife at bedside. denies pain. No signs of distress. Regular respiratory pattern, normal respiratory depth, unlabored respirations. Bed in lowest position, 2/2 bedrails up, bedside table within reach, bed wheels locked. Denies any needs at this time. Call light in reach. Will continue to monitor.            Kirti Garcia RN  09/05/19 8334

## 2019-09-10 LAB
BLOOD CULTURE, ROUTINE: NORMAL
CULTURE, BLOOD 2: NORMAL

## 2019-10-24 DIAGNOSIS — E03.8 SUBCLINICAL HYPOTHYROIDISM: ICD-10-CM

## 2019-10-24 RX ORDER — LEVOTHYROXINE SODIUM 0.07 MG/1
TABLET ORAL
Qty: 90 TABLET | Refills: 0 | Status: SHIPPED | OUTPATIENT
Start: 2019-10-24 | End: 2020-01-22

## 2019-11-11 DIAGNOSIS — M25.552 PAIN OF LEFT HIP JOINT: Primary | ICD-10-CM

## 2019-11-20 RX ORDER — AMLODIPINE BESYLATE 5 MG/1
TABLET ORAL
Qty: 90 TABLET | Refills: 0 | Status: ON HOLD | OUTPATIENT
Start: 2019-11-20 | End: 2022-01-02 | Stop reason: DRUGHIGH

## 2019-12-26 RX ORDER — SILDENAFIL 100 MG/1
TABLET, FILM COATED ORAL
Qty: 5 TABLET | Refills: 2 | Status: SHIPPED | OUTPATIENT
Start: 2019-12-26 | End: 2020-02-13 | Stop reason: SDUPTHER

## 2020-01-06 RX ORDER — LOSARTAN POTASSIUM AND HYDROCHLOROTHIAZIDE 25; 100 MG/1; MG/1
TABLET ORAL
Qty: 30 TABLET | Refills: 0 | Status: SHIPPED | OUTPATIENT
Start: 2020-01-06 | End: 2020-02-13 | Stop reason: SDUPTHER

## 2020-01-06 NOTE — TELEPHONE ENCOUNTER
Medication:   Requested Prescriptions     Pending Prescriptions Disp Refills    losartan-hydrochlorothiazide (HYZAAR) 100-25 MG per tablet [Pharmacy Med Name: LOSARTAN/HCTZ TABS 100/25] 90 tablet 4     Sig: TAKE 1 TABLET DAILY       Last Filled:  11/21/18 #90, 3 RF     Patient Phone Number: [phone] (home) [phone] (work)    Last appt: 11/21/2018 physical   Next appt: Visit date not found - left message on machine, please schedule appointment when patient returns call.     Lab Results   Component Value Date     09/05/2019    K 4.2 09/05/2019     09/05/2019    CO2 26 09/05/2019    BUN 23 (H) 09/05/2019    CREATININE 1.3 09/05/2019    GLUCOSE 160 (H) 09/05/2019    CALCIUM 10.1 09/05/2019    PROT 7.4 09/05/2019    LABALBU 4.4 09/05/2019    BILITOT 0.8 09/05/2019    ALKPHOS 56 09/05/2019    AST 18 09/05/2019    ALT 29 09/05/2019    LABGLOM 57 (A) 09/05/2019    GFRAA >60 09/05/2019    AGRATIO 1.5 09/05/2019    GLOB 3.0 09/05/2019

## 2020-01-22 RX ORDER — LEVOTHYROXINE SODIUM 0.07 MG/1
TABLET ORAL
Qty: 90 TABLET | Refills: 0 | Status: SHIPPED | OUTPATIENT
Start: 2020-01-22 | End: 2020-04-21

## 2020-01-22 NOTE — TELEPHONE ENCOUNTER
Medication:   Requested Prescriptions     Pending Prescriptions Disp Refills    levothyroxine (SYNTHROID) 75 MCG tablet [Pharmacy Med Name: L-THYROXINE (SYNTHROID) TABS 75MCG] 90 tablet 4     Sig: TAKE 1 TABLET DAILY       Last Filled:  10/24/19 #90, 0 RF     Patient Phone Number: [phone] (home) [phone] (work)    Last appt: 11/21/2018 physical   Next appt: 2/13/2020 with Dr. Agnes Geronimo     Last Thyroid:   TSH 11/22/18 1.72

## 2020-02-13 ENCOUNTER — TELEPHONE (OUTPATIENT)
Dept: FAMILY MEDICINE CLINIC | Age: 60
End: 2020-02-13

## 2020-02-13 ENCOUNTER — OFFICE VISIT (OUTPATIENT)
Dept: FAMILY MEDICINE CLINIC | Age: 60
End: 2020-02-13
Payer: COMMERCIAL

## 2020-02-13 VITALS
HEIGHT: 72 IN | TEMPERATURE: 97.7 F | OXYGEN SATURATION: 96 % | SYSTOLIC BLOOD PRESSURE: 132 MMHG | HEART RATE: 76 BPM | BODY MASS INDEX: 42.66 KG/M2 | WEIGHT: 315 LBS | DIASTOLIC BLOOD PRESSURE: 90 MMHG

## 2020-02-13 DIAGNOSIS — E03.9 ACQUIRED HYPOTHYROIDISM: ICD-10-CM

## 2020-02-13 DIAGNOSIS — I10 ESSENTIAL HYPERTENSION: ICD-10-CM

## 2020-02-13 DIAGNOSIS — Z12.5 PROSTATE CANCER SCREENING: ICD-10-CM

## 2020-02-13 LAB
BILIRUBIN, POC: NORMAL
BLOOD URINE, POC: NORMAL
CLARITY, POC: CLEAR
COLOR, POC: YELLOW
GLUCOSE URINE, POC: NORMAL
KETONES, POC: NORMAL
LEUKOCYTE EST, POC: NORMAL
NITRITE, POC: NORMAL
PH, POC: 7
PROTEIN, POC: NORMAL
SPECIFIC GRAVITY, POC: 1.02
UROBILINOGEN, POC: 0.2

## 2020-02-13 PROCEDURE — 99396 PREV VISIT EST AGE 40-64: CPT | Performed by: FAMILY MEDICINE

## 2020-02-13 PROCEDURE — 81002 URINALYSIS NONAUTO W/O SCOPE: CPT | Performed by: FAMILY MEDICINE

## 2020-02-13 RX ORDER — SILDENAFIL 100 MG/1
TABLET, FILM COATED ORAL
Qty: 10 TABLET | Refills: 2 | Status: SHIPPED | OUTPATIENT
Start: 2020-02-13 | End: 2021-02-09

## 2020-02-13 RX ORDER — LOSARTAN POTASSIUM AND HYDROCHLOROTHIAZIDE 25; 100 MG/1; MG/1
TABLET ORAL
Qty: 90 TABLET | Refills: 3 | Status: SHIPPED | OUTPATIENT
Start: 2020-02-13 | End: 2021-01-18

## 2020-02-13 RX ORDER — AMLODIPINE BESYLATE 10 MG/1
10 TABLET ORAL DAILY
Qty: 90 TABLET | Refills: 3 | Status: SHIPPED | OUTPATIENT
Start: 2020-02-13 | End: 2021-01-20

## 2020-02-13 ASSESSMENT — PATIENT HEALTH QUESTIONNAIRE - PHQ9
SUM OF ALL RESPONSES TO PHQ QUESTIONS 1-9: 0
SUM OF ALL RESPONSES TO PHQ QUESTIONS 1-9: 0
2. FEELING DOWN, DEPRESSED OR HOPELESS: 0
SUM OF ALL RESPONSES TO PHQ9 QUESTIONS 1 & 2: 0
1. LITTLE INTEREST OR PLEASURE IN DOING THINGS: 0

## 2020-02-13 NOTE — PROGRESS NOTES
Transportation needs:     Medical: None     Non-medical: None   Tobacco Use    Smoking status: Never Smoker    Smokeless tobacco: Never Used   Substance and Sexual Activity    Alcohol use:  Yes     Alcohol/week: 4.0 - 5.0 standard drinks     Types: 4 - 5 Cans of beer per week     Comment: occasional    Drug use: No    Sexual activity: Yes     Partners: Female   Lifestyle    Physical activity:     Days per week: None     Minutes per session: None    Stress: None   Relationships    Social connections:     Talks on phone: None     Gets together: None     Attends Denominational service: None     Active member of club or organization: None     Attends meetings of clubs or organizations: None     Relationship status: None    Intimate partner violence:     Fear of current or ex partner: None     Emotionally abused: None     Physically abused: None     Forced sexual activity: None   Other Topics Concern    None   Social History Narrative    None     Family History   Problem Relation Age of Onset    High Blood Pressure Mother     High Blood Pressure Father     Cancer Father         prostate    Cancer Sister         Lorrine Ferdinand    Cancer Sister         leukemia                            Review Of Systems  Skin: no abnormal pigmentation, rash, scaling, itching, masses, hair or nail changes  Eyes: no blurring, diplopia, or eye pain  Ears/Nose/Throat: no hearing loss, tinnitus, vertigo, nosebleed, nasal congestion, rhinorrhea, sore throat  Respiratory: no cough, pleuritic chest pain, dyspnea, or wheezing  Cardiovascular: no angina, GUPTA, orthopnea, PND, palpitations, or claudication  Gastrointestinal: no nausea, vomiting, heartburn, diarrhea, constipation, bloating, or abdominal pain  Genitourinary: no urinary urgency, frequency, dysuria, nocturia, hesitancy, or incontinence  Musculoskeletal: no arthritis, arthralgia, myalgia, weakness, or morning stiffness  Neurologic: no paralysis, paresis, paresthesia, seizures, tremors, or headaches  Hematologic/Lymphatic/Immunologic: no anemia, abnormal bleeding/bruising, fever, chills, night sweats, swollen glands, or unexplained weight loss  Endocrine: no heat or cold intolerance and no polyphagia, polydipsia, or polyuria    PHYSICAL EXAMINATION:  BP (!) 160/92   Pulse 76   Temp 97.7 °F (36.5 °C) (Tympanic)   Ht 5' 11.85\" (1.825 m)   Wt (!) 388 lb (176 kg)   SpO2 96%   BMI 52.84 kg/m²   General appearance: healthy, alert, no distress  Skin: Skin color, texture, turgor normal. No rashes or lesions. No induration or tightening palpated. Head: Normocephalic. No masses, lesions, tenderness or abnormalities  Eyes: conjunctivae/corneas clear. PERRL, EOM's intact. Ears: External ears normal. Canals clear. TM's clear bilaterally. Hearing normal to finger rub. Nose/Sinuses: Nares normal. Septum midline. Mucosa normal. No drainage or sinus tenderness. Oropharynx: Lips, mucosa, and tongue normal. Teeth and gums normal. Oropharynx clear with no exudate seen. Neck: Neck supple, and symmetric. No adenopathy. Thyroid symmetric, normal size, without nodule. Trachea is midline. No bruits. Back: Back symmetric, no curvature. ROM normal. No CVA tenderness. Lungs: Good diaphragmatic excursion. Lungs clear to auscultation bilaterally. No retractions or use of accessory muscles. Heart: PMI is not displaced, and no thrill noted. Regular rate and rhythm, with no rub, murmur or gallop noted. Abdomen: Abdomen soft, non-tender. BS normal. No masses, organomegaly. No hernia noted. Extremities: Extremities normal. No deformities,  or skin discoloration. No cyanosis or clubbing noted to the nails. 1+ p;itting ankle edema bilat  Lymph: No lymphadenopathy of the neck or supraclavicular regions. Musculoskeletal: Spine ROM normal. Muscular strength intact.   Peripheral pulses: radial=4/4, dorsalis pedis=4/4,  Neuro: Cranial nerves intact, Gait normal. Reflexes normal and symmetric, with no Sepsis 2.1 (H) 0.4 - 1.9 mmol/L   Urine, reflex to culture   Result Value Ref Range    Color, UA YELLOW Straw/Yellow    Clarity, UA TURBID (A) Clear    Glucose, Ur 100 (A) Negative mg/dL    Bilirubin Urine Negative Negative    Ketones, Urine Negative Negative mg/dL    Specific Gravity, UA 1.028 1.005 - 1.030    Blood, Urine LARGE (A) Negative    pH, UA 5.0 5.0 - 8.0    Protein,  (A) Negative mg/dL    Urobilinogen, Urine 0.2 <2.0 E.U./dL    Nitrite, Urine Negative Negative    Leukocyte Esterase, Urine Negative Negative    Microscopic Examination YES     Urine Reflex to Culture Not Indicated     Urine Type Not Specified    Microscopic Urinalysis   Result Value Ref Range    Hyaline Casts, UA 3 0 - 8 /LPF    WBC, UA 2 0 - 5 /HPF    RBC, UA 40 (H) 0 - 4 /HPF    Epi Cells 0 0 - 5 /HPF     All labs reviewed with patient. ASSESSMENT:   See encounter diagnoses  Encounter Diagnoses   Name Primary?  Acquired hypothyroidism  repeat TSH    Essential hypertension  slightly elevated, suggest increasing amlodipine from 5 to 10 mg    Prostate cancer screening  check PSA    Pre-diabetes  check A1c    Class 3 severe obesity due to excess calories with serious comorbidity and body mass index (BMI) of 40.0 to 44.9 in adult [facility])  lose 25 pounds    Pure hypercholesterolemia  check lipid panel    Herniated lumbar intervertebral disc  stable currently   Had flu and first shingrix at [employer]:   See orders and medications filed with this encounter.   Labs today  Get second shingrix  Due for colonoscopy  Inc norvas form 5 to 10  Recheck bp in 6 weeks  Call in thyroid refill after labs

## 2020-02-14 LAB
ANION GAP SERPL CALCULATED.3IONS-SCNC: 13 MMOL/L (ref 3–16)
BUN BLDV-MCNC: 21 MG/DL (ref 7–20)
CALCIUM SERPL-MCNC: 9.5 MG/DL (ref 8.3–10.6)
CHLORIDE BLD-SCNC: 99 MMOL/L (ref 99–110)
CHOLESTEROL, TOTAL: 180 MG/DL (ref 0–199)
CO2: 28 MMOL/L (ref 21–32)
CREAT SERPL-MCNC: 1.1 MG/DL (ref 0.9–1.3)
ESTIMATED AVERAGE GLUCOSE: 122.6 MG/DL
GFR AFRICAN AMERICAN: >60
GFR NON-AFRICAN AMERICAN: >60
GLUCOSE BLD-MCNC: 97 MG/DL (ref 70–99)
HBA1C MFR BLD: 5.9 %
HDLC SERPL-MCNC: 42 MG/DL (ref 40–60)
LDL CHOLESTEROL CALCULATED: 106 MG/DL
POTASSIUM SERPL-SCNC: 4.8 MMOL/L (ref 3.5–5.1)
PROSTATE SPECIFIC ANTIGEN: 0.46 NG/ML (ref 0–4)
SODIUM BLD-SCNC: 140 MMOL/L (ref 136–145)
TRIGL SERPL-MCNC: 161 MG/DL (ref 0–150)
TSH SERPL DL<=0.05 MIU/L-ACNC: 1.79 UIU/ML (ref 0.27–4.2)
VLDLC SERPL CALC-MCNC: 32 MG/DL

## 2020-04-15 ENCOUNTER — TELEPHONE (OUTPATIENT)
Dept: FAMILY MEDICINE CLINIC | Age: 60
End: 2020-04-15

## 2020-04-21 RX ORDER — LEVOTHYROXINE SODIUM 0.07 MG/1
TABLET ORAL
Qty: 90 TABLET | Refills: 2 | Status: SHIPPED | OUTPATIENT
Start: 2020-04-21 | End: 2021-01-18

## 2020-04-23 ENCOUNTER — NURSE ONLY (OUTPATIENT)
Dept: FAMILY MEDICINE CLINIC | Age: 60
End: 2020-04-23

## 2020-04-23 VITALS — DIASTOLIC BLOOD PRESSURE: 80 MMHG | TEMPERATURE: 98.4 F | SYSTOLIC BLOOD PRESSURE: 138 MMHG

## 2020-10-13 ENCOUNTER — HOSPITAL ENCOUNTER (OUTPATIENT)
Dept: GENERAL RADIOLOGY | Age: 60
Discharge: HOME | End: 2020-10-13
Payer: COMMERCIAL

## 2020-10-13 PROCEDURE — 72170 X-RAY EXAM OF PELVIS: CPT

## 2020-10-13 PROCEDURE — 72100 X-RAY EXAM L-S SPINE 2/3 VWS: CPT

## 2021-01-16 DIAGNOSIS — E03.8 SUBCLINICAL HYPOTHYROIDISM: ICD-10-CM

## 2021-01-18 RX ORDER — LOSARTAN POTASSIUM AND HYDROCHLOROTHIAZIDE 25; 100 MG/1; MG/1
TABLET ORAL
Qty: 90 TABLET | Refills: 0 | Status: SHIPPED | OUTPATIENT
Start: 2021-01-18 | End: 2021-04-16

## 2021-01-18 RX ORDER — LEVOTHYROXINE SODIUM 0.07 MG/1
TABLET ORAL
Qty: 90 TABLET | Refills: 0 | Status: SHIPPED | OUTPATIENT
Start: 2021-01-18 | End: 2021-04-16

## 2021-01-18 NOTE — TELEPHONE ENCOUNTER
Medication:   Requested Prescriptions     Pending Prescriptions Disp Refills    losartan-hydroCHLOROthiazide (HYZAAR) 100-25 MG per tablet [Pharmacy Med Name: LOSARTAN/ HYDROCHLOROTHIAZIDE TABS 100/25MG] 90 tablet 3     Sig: TAKE 1 TABLET DAILY    levothyroxine (SYNTHROID) 75 MCG tablet [Pharmacy Med Name: L-THYROXINE (SYNTHROID) TABS 75MCG] 90 tablet 3     Sig: TAKE 1 TABLET DAILY       Last Filled:   Levothyroxine 4/21/20 #90, 2 RF  hyzaar 2/13/20 #90, 3 RF   Patient Phone Number: [phone] (home) [phone] (work)    Last appt: 2/13/2020 annual exam   Next appt: Visit date not found    Lab Results   Component Value Date     02/13/2020    K 4.8 02/13/2020    CL 99 02/13/2020    CO2 28 02/13/2020    BUN 21 (H) 02/13/2020    CREATININE 1.1 02/13/2020    GLUCOSE 97 02/13/2020    CALCIUM 9.5 02/13/2020    PROT 7.4 09/05/2019    LABALBU 4.4 09/05/2019    BILITOT 0.8 09/05/2019    ALKPHOS 56 09/05/2019    AST 18 09/05/2019    ALT 29 09/05/2019    LABGLOM >60 02/13/2020    GFRAA >60 02/13/2020    AGRATIO 1.5 09/05/2019    GLOB 3.0 09/05/2019

## 2021-01-20 RX ORDER — AMLODIPINE BESYLATE 10 MG/1
TABLET ORAL
Qty: 90 TABLET | Refills: 0 | Status: SHIPPED | OUTPATIENT
Start: 2021-01-20 | End: 2022-01-02 | Stop reason: DRUGHIGH

## 2021-01-20 NOTE — TELEPHONE ENCOUNTER
Medication:   Requested Prescriptions     Pending Prescriptions Disp Refills    amLODIPine (NORVASC) 10 MG tablet [Pharmacy Med Name: AMLODIPINE BESYLATE TABS 10MG] 90 tablet 3     Sig: TAKE 1 TABLET DAILY       Last Filled:  2/13/20 #90, 3 RF     Patient Phone Number: [phone] (home) [phone] (work)    Last appt: 2/13/2020 annual exam   Next appt: Visit date not found    Lab Results   Component Value Date     02/13/2020    K 4.8 02/13/2020    CL 99 02/13/2020    CO2 28 02/13/2020    BUN 21 (H) 02/13/2020    CREATININE 1.1 02/13/2020    GLUCOSE 97 02/13/2020    CALCIUM 9.5 02/13/2020    PROT 7.4 09/05/2019    LABALBU 4.4 09/05/2019    BILITOT 0.8 09/05/2019    ALKPHOS 56 09/05/2019    AST 18 09/05/2019    ALT 29 09/05/2019    LABGLOM >60 02/13/2020    GFRAA >60 02/13/2020    AGRATIO 1.5 09/05/2019    GLOB 3.0 09/05/2019

## 2021-01-29 DIAGNOSIS — M25.552 HIP PAIN, CHRONIC, LEFT: Primary | ICD-10-CM

## 2021-01-29 DIAGNOSIS — G89.29 HIP PAIN, CHRONIC, LEFT: Primary | ICD-10-CM

## 2021-02-08 ENCOUNTER — OFFICE VISIT (OUTPATIENT)
Dept: ORTHOPEDIC SURGERY | Age: 61
End: 2021-02-08
Payer: COMMERCIAL

## 2021-02-08 VITALS — BODY MASS INDEX: 42.66 KG/M2 | HEIGHT: 72 IN | WEIGHT: 315 LBS | TEMPERATURE: 98.2 F

## 2021-02-08 DIAGNOSIS — M25.552 HIP PAIN, LEFT: Primary | ICD-10-CM

## 2021-02-08 DIAGNOSIS — M16.12 PRIMARY OSTEOARTHRITIS OF LEFT HIP: ICD-10-CM

## 2021-02-08 DIAGNOSIS — E66.01 MORBID OBESITY WITH BMI OF 50.0-59.9, ADULT (HCC): ICD-10-CM

## 2021-02-08 PROCEDURE — 99203 OFFICE O/P NEW LOW 30 MIN: CPT | Performed by: ORTHOPAEDIC SURGERY

## 2021-02-08 RX ORDER — NAPROXEN SODIUM 220 MG
220 TABLET ORAL 2 TIMES DAILY WITH MEALS
COMMUNITY
End: 2021-02-09

## 2021-02-08 RX ORDER — CELECOXIB 200 MG/1
200 CAPSULE ORAL DAILY
Qty: 60 CAPSULE | Refills: 0 | Status: ON HOLD | OUTPATIENT
Start: 2021-02-08 | End: 2022-01-05 | Stop reason: HOSPADM

## 2021-02-08 NOTE — LETTER
[facility]  [address] [address]  Phone: [phone]  Fax: [phone]    Melinda Weber MD        February 9, 2021     Destiny Perales MD  [facility] [address]    Patient: Sondra Stoddard  MR Number: [id number]  YOB: 1960  Date of Visit: 2/8/2021    Dear Dr. Destiny Perales: Thank you for the request for consultation for Sondra Stoddard to me for the evaluation of left hip arthritis and severe morbid obesity. Below are the relevant portions of my assessment and plan of care. If you have questions, please do not hesitate to call me. I look forward to following Selena Appiah along with you.     Sincerely,        Melinda Weber MD

## 2021-02-08 NOTE — PROGRESS NOTES
Carlos Leyva MD  [address] [facility], [address] [place] [address] Drive  [address]  [facility], [address]    History of Present Illness:  Chief Complaint   Patient presents with    Hip Pain     Left hip pain x2 yrs. No known injury. Ozzie Groves is a 63 y.o. male here for evaluation of left hip pain. Pain assessment has been completed and is documented below. Patient was referred here for orthopaedic evaluation by Esmer Vazquez. Svetlana Mack MD.  He complains of left hip pain for the past 2 years with no known injury. His pain is focused in the groin. However, when he sits in the kitchen chair, his pain is focused on the lateral aspect. 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 He complains of pain when walking and standing for periods of time. He currently is taking Naproxen for his pain and feels that if offers some relief. He takes it once he is off of work and feels it takes an hour to start relieving his pain. His pain does not wake him up at night. He denies back pain. He does experience some numbness and tingling intermittently. He has not discussed weight loss with his primary care physician. He has proceeded with injections for his right hip prior to proceeding with a right total hip replacement by Dr. Virginia Padgett in 2017. He feels that the 1st injection offered him relief, the 2nd injection offered less relief and the third injection offered no relief. So he does not want to proceed with them for his left hip at this time.       Pain Assessment  Location of Pain: Pelvis  Location Modifiers: Left  Severity of Pain: 7  Quality of Pain: Aching  Duration of Pain: Persistent  Frequency of Pain: Intermittent  Date Pain First Started: (x2 yrs)  Aggravating Factors: Standing, Walking, Stairs  Limiting Behavior: Yes  Relieving Factors: Rest  Result of Injury: No  Work-Related Injury: No  Are there other pain locations you wish to document?: No    Medication Review:  Current Outpatient Medications   Medication Sig Dispense Refill    celecoxib (CELEBREX) 200 MG capsule Take 1 capsule by mouth daily 60 capsule 0    amLODIPine (NORVASC) 10 MG tablet TAKE 1 TABLET DAILY 90 tablet 0    losartan-hydroCHLOROthiazide (HYZAAR) 100-25 MG per tablet TAKE 1 TABLET DAILY 90 tablet 0    levothyroxine (SYNTHROID) 75 MCG tablet TAKE 1 TABLET DAILY 90 tablet 0    amLODIPine (NORVASC) 5 MG tablet TAKE 1 TABLET NIGHTLY 90 tablet 0    aspirin 81 MG tablet Take 81 mg by mouth daily       No current facility-administered medications for this visit. Review of Systems:  Relevant review of systems reviewed and can be found in the Media section of patient's chart. Medical History:  Past Medical History:   Diagnosis Date    Bone marrow donor     x 2    HTN (hypertension) 5/23/2012    Hyperlipidemia     Hypertension     Lumbar herniated disc     x 3    Obesity         Past Surgical History:   Procedure Laterality Date    COLONOSCOPY  01/16/2012    COLONOSCOPY  10/14/2014    KNEE SURGERY      OTHER SURGICAL HISTORY      epidural injection x 2    TOTAL HIP ARTHROPLASTY Right 2017    Fransisca Leary, Dr Frederick Duncan, social and family histories, and medications were reviewed and updated as appropriate. General Exam:  Vital Signs:  Temp 98.2 °F (36.8 °C) (Infrared)   Ht 6' (1.829 m)   Wt (!) 403 lb (182.8 kg)   BMI 54.66 kg/m²    Constitutional: Patient is adequately groomed with severe morbid obesity noted. Mental Status: The patient is oriented to time, place and person. The patient's mood and affect are appropriate. Neurological: The patient has good coordination. There is no focal weakness or sensory deficit. Focal examination of left hip is as follows: Inspection: Adiposity hides muscle contours and no significant limb length discrepancy.   No gross atrophy in any particular myotome. Palpation: Mild tenderness to the greater trochanter/trochanteric bursa. No tenderness to the sacroiliac joint. No tenderness to the knee joint. Range of Motion:   Hip flexion 90°. Hip abduction 40°. Knee range of motion shows functional range without pain. Ankle dorsiflexion and plantarflexion show functional range of motion. Strength:   Hip flexion 5-/5   Hip abduction 5-/ 5   Hip adduction 5-/5. Knee flexion and extension 4±5/5 without pain. Dorsiflexion and plantarflexion in ankle are 4±5/5. Special Tests:   Log roll negative. Trendelenburg  negative    Additional comments: Sensation to light touch grossly present and capillary refill less than 2 seconds. Skin: There are no rashes, ulcerations or lesions. Gait: Decreased stride length and weight shift, favoring his left side. Radiology:     X-rays obtained today have been reviewed in the office:  2 views; left hip. Impression: No evidence of acute fracture or dislocation. No lytic or blastic areas within the iliac wings or femoral metaphyseal regions. Moderate to severe joint space narrowing and osteophyte formation of the left hip    X-rays of the Pelvis were obtained on 10/13/2020 and reviewed today in office:  Impression   Mild degenerative changes in the lumbar spine.       Severe degenerative changes left hip   Images personally reviewed and show stable right JEANNE.       X-rays of the Lumbar Spine was obtained on 10/13/2020 and reviewed today in office:  Impression   Mild degenerative changes in the lumbar spine.       Severe degenerative changes left hip       Office Orders/Procedures:  Orders Placed This Encounter   Procedures    XR HIP LEFT (2-3 VIEWS)     AP and Frog-leg, Already has pelvis     Standing Status:   Future     Number of Occurrences:   1     Standing Expiration Date:   2/8/2022     Order Specific Question:   Reason for exam:     Answer:   Hip Pain   Kush Nelson MD, Bariatric Surgery, [facility]     Referral Priority:   Routine     Referral Type:   Eval and Treat     Referral Reason:   Specialty Services Required     Requested Specialty:   Bariatrics     Number of Visits Requested:   1       Impression:   Diagnosis Orders   1. Hip pain, left  XR HIP LEFT (2-3 VIEWS)   2. Primary osteoarthritis of left hip     3. Morbid obesity with BMI of 50.0-59.9, adult [facility])  Mingo Chance MD, Bariatric Surgery, [facility]        Treatment Plan:  I have discussed treatment options with the patient. After reviewing his x-rays, is left hip is almost bone on bone. The biggest issue now is getting ready to proceed with surgery. Going through his history when he proceeded with his right total hip arthroplasty in 2017 he weighed at 355 lbs, which put him at about a 45 - 46 BMI. He is currently at 403 lb which is a 54.66 BMI. I would like for him to get closer to a 40 BMI prior to proceeding with surgery to reduce his risk, which should be about 40-50 lbs. Informed him that every patient with a BMI over 35, has an increase in risk of infection, fracture and cardiopulmonary complications and DVT / PE after surgery to about 3-5%. Current replacements are expected to last 20 plus years, at his current weight they could only last 10-12 years putting him at risk of early revision which would also likely not go well at his current weight. this could cause him to end up with a wheelchair, as there would be no other options avaliable to him. Due to all these factors, we need to address both his hip pain and his weight. I will refer him to Dr. Donna Armstrong for weight management solution options. I informed him that I have other patients who have had great success in working with Dr. Donna Armstrong first and then proceeding with a hip replacement.   Studies across the nation show this type of approach is much more successful than trying to replace the joint and hoping the patient will become more active and lose weight after hip replacement surgery. Case in point he has gained over 50 pounds since his last surgery. Also he is still currently maintaining ability to work with his hip in its current conditions and any weight loss may allow his pain to reduce and could further delay the need for surgery. I will send him in a script of Celebrex to try instead of the Naroxen as it may reduce his pain better and last longer. I offered him to proceed with a hip injection at the hospital with the interventional radiologist, which he declined at this time due to past experience. I will forward the plan with Dr. Ashanti Ladd his primary care physician. Informed him that having at the excess tissue to operate through is what leads to higher infection risk in obese patients. Also, overweight patients could have underlying prediabetes which could inhibit the WBCs from fighting or controlling any bacteria / infection. I have reviewed patient's pertinent medical history, relevant laboratory and imaging studies, and past surgical history. Patient's medications have been reviewed and were discussed during the visit. Patient was advised to keep future appointments with their respective specialty care team(s). Patient had the opportunity to ask questions, all of which were answered to the best of my ability and with patient satisfaction. Patient understands and is agreeable with the care plan following today's visit. Patient is to schedule an appointment for any new or worsening symptoms. By signing my name below, kisha Mandujano that this documentation has been prepared under the direction and in the presence of Carmen Mcclain MD.   Electronically Signed: Joseline Collins, 2/8/21, 8:08 AM JAKOB. Rafia Fitzgerald MD, personally performed the services described in this documentation. All medical record entries made by the joseline were at my direction and in my presence.   I have

## 2021-02-17 ENCOUNTER — TELEPHONE (OUTPATIENT)
Dept: ORTHOPEDIC SURGERY | Age: 61
End: 2021-02-17

## 2021-02-17 RX ORDER — ETODOLAC 200 MG/1
200 CAPSULE ORAL EVERY 8 HOURS PRN
Qty: 90 CAPSULE | Refills: 3 | Status: SHIPPED
Start: 2021-02-17 | End: 2021-02-18

## 2021-02-17 NOTE — TELEPHONE ENCOUNTER
[facility]ed [facility] was on hold for 10 minutes before reaching someone. Spoke w/Rich ANTONIO Whom could not confirm that they had received the information from the insurance regarding the etodolac. She stated that she only deals w/PA's and that the celebrex was approved but needed me to verify mg and quantity of the rx. Informed her I would try to call back and speak w/someone else. Called back and spoke w/Raza. She stated that she had not received anything from the insurance and that we would need to send in a new rx for the medication. The Etodolac would be covered under the insurance. Rx pending for Etodolac caps, 200 mg. Please advise how pt is to take medication and quantity.

## 2021-02-18 RX ORDER — ETODOLAC 200 MG/1
200 CAPSULE ORAL EVERY 8 HOURS PRN
Qty: 270 CAPSULE | Refills: 0 | Status: ON HOLD | OUTPATIENT
Start: 2021-02-18 | End: 2022-01-05 | Stop reason: HOSPADM

## 2021-06-21 ENCOUNTER — TELEPHONE (OUTPATIENT)
Dept: FAMILY MEDICINE CLINIC | Age: 61
End: 2021-06-21

## 2021-06-21 RX ORDER — AMLODIPINE BESYLATE 5 MG/1
TABLET ORAL
Qty: 90 TABLET | Refills: 0 | Status: CANCELLED | OUTPATIENT
Start: 2021-06-21

## 2021-06-21 RX ORDER — AMLODIPINE BESYLATE 10 MG/1
10 TABLET ORAL DAILY
Qty: 90 TABLET | Refills: 3 | Status: SHIPPED | OUTPATIENT
Start: 2021-06-21 | End: 2021-06-29 | Stop reason: SDUPTHER

## 2021-06-21 NOTE — TELEPHONE ENCOUNTER
----- Message from [employer] sent at 6/19/2021 11:17 AM EDT -----  Subject: Appointment Request    Reason for Call: Routine Physical Exam    QUESTIONS  Type of Appointment? Established Patient  Reason for appointment request? Available appointments did not meet   patient need  Additional Information for Provider? Patient would to get an appointment   for a Annual physical and also existing condition follow up for his blood   pressure medication amLODIPine (NORVASC) 5 MG. First appointment is 07/07   will need medication before that date only has 5 days left.  ---------------------------------------------------------------------------  --------------  CALL BACK INFO  What is the best way for the office to contact you? OK to leave message on   voicemail  Preferred Call Back Phone Number? [phone]  ---------------------------------------------------------------------------  --------------  SCRIPT ANSWERS  Relationship to Patient? Self  Appointment reason? Well Care/Follow Ups  Select a Well Care/Follow Ups appointment reason? Adult Physical Exam   [Medicare Annual Wellness, AWV, PAP, Pelvic]  (If the patient has Medicare as their primary insurance coverage ask this   question) Are you requesting a Medicare Annual Wellness Visit? No  (Is the patient requesting a pap smear with their physical exam?)? No  (Is the patient requesting their annual physical and does not need PAP or   AWV per above?)? Yes   Have you been diagnosed with, awaiting test results for, or told that you   are suspected of having COVID-19 (Coronavirus)? (If patient has tested   negative or was tested as a requirement for work, school, or travel and   not based on symptoms, answer no)? No  Do you currently have flu-like symptoms including fever or chills, cough,   shortness of breath, difficulty breathing, or new loss of taste or smell? No  Have you had close contact with someone with COVID-19 in the last 14 days?    No  (Service Expert  click yes below to proceed with Gimmie As Usual   Scheduling)?  Yes

## 2021-06-29 ENCOUNTER — OFFICE VISIT (OUTPATIENT)
Dept: FAMILY MEDICINE CLINIC | Age: 61
End: 2021-06-29
Payer: COMMERCIAL

## 2021-06-29 ENCOUNTER — TELEPHONE (OUTPATIENT)
Dept: FAMILY MEDICINE CLINIC | Age: 61
End: 2021-06-29

## 2021-06-29 VITALS
SYSTOLIC BLOOD PRESSURE: 138 MMHG | WEIGHT: 315 LBS | BODY MASS INDEX: 42.66 KG/M2 | DIASTOLIC BLOOD PRESSURE: 85 MMHG | HEIGHT: 72 IN | OXYGEN SATURATION: 98 % | HEART RATE: 71 BPM

## 2021-06-29 DIAGNOSIS — M79.672 PAIN IN BOTH FEET: ICD-10-CM

## 2021-06-29 DIAGNOSIS — R73.03 PRE-DIABETES: ICD-10-CM

## 2021-06-29 DIAGNOSIS — I10 ESSENTIAL HYPERTENSION: Primary | ICD-10-CM

## 2021-06-29 DIAGNOSIS — E03.8 SUBCLINICAL HYPOTHYROIDISM: ICD-10-CM

## 2021-06-29 DIAGNOSIS — M79.671 PAIN IN BOTH FEET: ICD-10-CM

## 2021-06-29 DIAGNOSIS — I10 ESSENTIAL HYPERTENSION: ICD-10-CM

## 2021-06-29 DIAGNOSIS — E78.00 PURE HYPERCHOLESTEROLEMIA: ICD-10-CM

## 2021-06-29 DIAGNOSIS — E66.01 CLASS 3 SEVERE OBESITY DUE TO EXCESS CALORIES WITH SERIOUS COMORBIDITY AND BODY MASS INDEX (BMI) OF 40.0 TO 44.9 IN ADULT (HCC): ICD-10-CM

## 2021-06-29 LAB
A/G RATIO: 1.8 (ref 1.1–2.2)
ALBUMIN SERPL-MCNC: 4.6 G/DL (ref 3.4–5)
ALP BLD-CCNC: 56 U/L (ref 40–129)
ALT SERPL-CCNC: 22 U/L (ref 10–40)
ANION GAP SERPL CALCULATED.3IONS-SCNC: 16 MMOL/L (ref 3–16)
AST SERPL-CCNC: 30 U/L (ref 15–37)
BASOPHILS ABSOLUTE: 0.1 K/UL (ref 0–0.2)
BASOPHILS RELATIVE PERCENT: 0.8 %
BILIRUB SERPL-MCNC: 1.1 MG/DL (ref 0–1)
BUN BLDV-MCNC: 18 MG/DL (ref 7–20)
CALCIUM SERPL-MCNC: 9.4 MG/DL (ref 8.3–10.6)
CHLORIDE BLD-SCNC: 99 MMOL/L (ref 99–110)
CHOLESTEROL, TOTAL: 174 MG/DL (ref 0–199)
CO2: 24 MMOL/L (ref 21–32)
CREAT SERPL-MCNC: 1.1 MG/DL (ref 0.9–1.3)
EOSINOPHILS ABSOLUTE: 0.1 K/UL (ref 0–0.6)
EOSINOPHILS RELATIVE PERCENT: 2.3 %
FOLATE: 5.9 NG/ML (ref 4.78–24.2)
GFR AFRICAN AMERICAN: >60
GFR NON-AFRICAN AMERICAN: >60
GLOBULIN: 2.6 G/DL
GLUCOSE BLD-MCNC: 91 MG/DL (ref 70–99)
HCT VFR BLD CALC: 47.4 % (ref 40.5–52.5)
HDLC SERPL-MCNC: 38 MG/DL (ref 40–60)
HEMOGLOBIN: 16.3 G/DL (ref 13.5–17.5)
LDL CHOLESTEROL CALCULATED: 105 MG/DL
LYMPHOCYTES ABSOLUTE: 1.1 K/UL (ref 1–5.1)
LYMPHOCYTES RELATIVE PERCENT: 17.2 %
MCH RBC QN AUTO: 31.6 PG (ref 26–34)
MCHC RBC AUTO-ENTMCNC: 34.4 G/DL (ref 31–36)
MCV RBC AUTO: 91.9 FL (ref 80–100)
MONOCYTES ABSOLUTE: 0.5 K/UL (ref 0–1.3)
MONOCYTES RELATIVE PERCENT: 7.8 %
NEUTROPHILS ABSOLUTE: 4.6 K/UL (ref 1.7–7.7)
NEUTROPHILS RELATIVE PERCENT: 71.9 %
PDW BLD-RTO: 13.4 % (ref 12.4–15.4)
PLATELET # BLD: 193 K/UL (ref 135–450)
PMV BLD AUTO: 7.7 FL (ref 5–10.5)
POTASSIUM SERPL-SCNC: 4.8 MMOL/L (ref 3.5–5.1)
RBC # BLD: 5.15 M/UL (ref 4.2–5.9)
SODIUM BLD-SCNC: 139 MMOL/L (ref 136–145)
TOTAL PROTEIN: 7.2 G/DL (ref 6.4–8.2)
TRIGL SERPL-MCNC: 157 MG/DL (ref 0–150)
TSH SERPL DL<=0.05 MIU/L-ACNC: 1.7 UIU/ML (ref 0.27–4.2)
VITAMIN B-12: 320 PG/ML (ref 211–911)
VLDLC SERPL CALC-MCNC: 31 MG/DL
WBC # BLD: 6.4 K/UL (ref 4–11)

## 2021-06-29 PROCEDURE — 99396 PREV VISIT EST AGE 40-64: CPT | Performed by: FAMILY MEDICINE

## 2021-06-29 RX ORDER — LOSARTAN POTASSIUM AND HYDROCHLOROTHIAZIDE 25; 100 MG/1; MG/1
TABLET ORAL
Qty: 90 TABLET | Refills: 3 | Status: ON HOLD | OUTPATIENT
Start: 2021-06-29 | End: 2022-01-05 | Stop reason: HOSPADM

## 2021-06-29 RX ORDER — LEVOTHYROXINE SODIUM 0.07 MG/1
TABLET ORAL
Qty: 90 TABLET | Refills: 3 | Status: SHIPPED | OUTPATIENT
Start: 2021-06-29 | End: 2022-03-18 | Stop reason: SDUPTHER

## 2021-06-29 RX ORDER — AMLODIPINE BESYLATE 10 MG/1
10 TABLET ORAL DAILY
Qty: 90 TABLET | Refills: 3 | Status: SHIPPED | OUTPATIENT
Start: 2021-06-29 | End: 2022-01-02 | Stop reason: DRUGHIGH

## 2021-06-29 SDOH — ECONOMIC STABILITY: FOOD INSECURITY: WITHIN THE PAST 12 MONTHS, THE FOOD YOU BOUGHT JUST DIDN'T LAST AND YOU DIDN'T HAVE MONEY TO GET MORE.: NEVER TRUE

## 2021-06-29 SDOH — ECONOMIC STABILITY: FOOD INSECURITY: WITHIN THE PAST 12 MONTHS, YOU WORRIED THAT YOUR FOOD WOULD RUN OUT BEFORE YOU GOT MONEY TO BUY MORE.: NEVER TRUE

## 2021-06-29 ASSESSMENT — PATIENT HEALTH QUESTIONNAIRE - PHQ9
SUM OF ALL RESPONSES TO PHQ QUESTIONS 1-9: 0
1. LITTLE INTEREST OR PLEASURE IN DOING THINGS: 0
2. FEELING DOWN, DEPRESSED OR HOPELESS: 0
SUM OF ALL RESPONSES TO PHQ QUESTIONS 1-9: 0
SUM OF ALL RESPONSES TO PHQ QUESTIONS 1-9: 0
SUM OF ALL RESPONSES TO PHQ9 QUESTIONS 1 & 2: 0

## 2021-06-29 ASSESSMENT — SOCIAL DETERMINANTS OF HEALTH (SDOH): HOW HARD IS IT FOR YOU TO PAY FOR THE VERY BASICS LIKE FOOD, HOUSING, MEDICAL CARE, AND HEATING?: NOT HARD AT ALL

## 2021-06-29 NOTE — PROGRESS NOTES
Chief Complaint:   Oziel Ann is a 63 y.o. 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    Herniated lumbar intervertebral disc    Primary osteoarthritis of left hip     Past Medical History:   Diagnosis Date    Bone marrow donor     x 2    HTN (hypertension) 5/23/2012    HTN (hypertension) 5/23/2012    Hyperlipidemia     Hyperlipidemia     Hypertension     Lumbar herniated disc     x 3    Obesity     Pre-diabetes 6/4/2014    Subclinical hypothyroidism 6/4/2014       Past Surgical History:   Procedure Laterality Date    COLONOSCOPY  01/16/2012    COLONOSCOPY  10/14/2014    KNEE SURGERY      OTHER SURGICAL HISTORY      epidural injection x 2    TOTAL HIP ARTHROPLASTY Right 2017    Dr Otto Jewell       Current Outpatient Medications   Medication Sig Dispense Refill    amLODIPine (NORVASC) 10 MG tablet Take 1 tablet by mouth daily 90 tablet 3    levothyroxine (SYNTHROID) 75 MCG tablet 1 po qam 90 tablet 3    losartan-hydroCHLOROthiazide (HYZAAR) 100-25 MG per tablet 1 po qam 90 tablet 3    celecoxib (CELEBREX) 200 MG capsule Take 1 capsule by mouth daily 60 capsule 0    amLODIPine (NORVASC) 10 MG tablet TAKE 1 TABLET DAILY 90 tablet 0    amLODIPine (NORVASC) 5 MG tablet TAKE 1 TABLET NIGHTLY 90 tablet 0    aspirin 81 MG tablet Take 81 mg by mouth daily      etodolac (LODINE) 200 MG capsule Take 1 capsule by mouth every 8 hours as needed (pain) Take with food 270 capsule 0     No current facility-administered medications for this visit.      No Known Allergies    Social History Socioeconomic History    Marital status:      Spouse name: None    Number of children: None    Years of education: None    Highest education level: None   Occupational History    None   Tobacco Use    Smoking status: Never Smoker    Smokeless tobacco: Never Used   Substance and Sexual Activity    Alcohol use: Yes     Alcohol/week: 4.0 - 5.0 standard drinks     Types: 4 - 5 Cans of beer per week     Comment: occasional    Drug use: No    Sexual activity: Yes     Partners: Female   Other Topics Concern    None   Social History Narrative    None     Social Determinants of Health     Financial Resource Strain: Low Risk     Difficulty of Paying Living Expenses: Not hard at all   Food Insecurity: No Food Insecurity    Worried About Running Out of Food in the Last Year: Never true    Shanice of Food in the Last Year: Never true   Transportation Needs:     Lack of Transportation (Medical):      Lack of Transportation (Non-Medical):    Physical Activity:     Days of Exercise per Week:     Minutes of Exercise per Session:    Stress:     Feeling of Stress :    Social Connections:     Frequency of Communication with Friends and Family:     Frequency of Social Gatherings with Friends and Family:     Attends Taoist Services:     Active Member of Clubs or Organizations:     Attends Club or Organization Meetings:     Marital Status:    Intimate Partner Violence:     Fear of Current or Ex-Partner:     Emotionally Abused:     Physically Abused:     Sexually Abused:      Family History   Problem Relation Age of Onset    High Blood Pressure Mother     High Blood Pressure Father     Cancer Father         prostate, melanoma    Cancer Sister         Jammie Thorpeuch Sister         melanoma    Cancer Sister         leukemia                            Review Of Systems  Skin: no abnormal pigmentation, rash, scaling, itching, masses, hair or nail changes  Eyes: no blurring, diplopia, or eye pain  Ears/Nose/Throat: no hearing loss, tinnitus, vertigo, nosebleed, nasal congestion, rhinorrhea, sore throat  Respiratory: no cough, pleuritic chest pain, dyspnea, or wheezing  Cardiovascular: no angina, GUPTA, orthopnea, PND, palpitations, or claudication  Gastrointestinal: no nausea, vomiting, heartburn, diarrhea, constipation, bloating, or abdominal pain  Genitourinary: no urinary urgency, frequency, dysuria, nocturia, hesitancy, or incontinence  Musculoskeletal: no arthritis, arthralgia, myalgia, weakness, or morning stiffness  Neurologic: no paralysis, paresis, paresthesia, seizures, tremors, or headaches  Hematologic/Lymphatic/Immunologic: no anemia, abnormal bleeding/bruising, fever, chills, night sweats, swollen glands, or unexplained weight loss  Endocrine: no heat or cold intolerance and no polyphagia, polydipsia, or polyuria    PHYSICAL EXAMINATION:  /85   Pulse 71   Ht 6' (1.829 m)   Wt (!) 384 lb (174.2 kg)   SpO2 98%   BMI 52.08 kg/m²   General appearance: healthy, alert, no distress  Skin: Skin color, texture, turgor normal. No rashes or lesions. No induration or tightening palpated. Head: Normocephalic. No masses, lesions, tenderness or abnormalities, hyperpigmented macules on the lower back  Eyes: conjunctivae/corneas clear. PERRL, EOM's intact. Ears: External ears normal. Canals clear. TM's clear bilaterally. Hearing normal to finger rub. Nose/Sinuses: Nares normal. Septum midline. Mucosa normal. No drainage or sinus tenderness. Oropharynx: Lips, mucosa, and tongue normal. Teeth and gums normal. Oropharynx clear with no exudate seen. Neck: Neck supple, and symmetric. No adenopathy. Thyroid symmetric, normal size, without nodule. Trachea is midline. No bruits. Back: Back symmetric, no curvature. ROM normal. No CVA tenderness. Lungs: Good diaphragmatic excursion. Lungs clear to auscultation bilaterally. No retractions or use of accessory muscles.    Heart: PMI is not displaced, and no thrill noted. Regular rate and rhythm, with no rub, murmur or gallop noted. Abdomen: Abdomen soft, non-tender. BS normal. No masses, organomegaly. No hernia noted. Extremities: Extremities normal. No deformities, edema, or skin discoloration. No cyanosis or clubbing noted to the nails. Lymph: No lymphadenopathy of the neck or supraclavicular regions. Musculoskeletal: Spine ROM normal. Muscular strength intact. Peripheral pulses: radial=4/4, dorsalis pedis= 2 out of 4, some venous stasis changes, some toenail fungus changes noted  Neuro: Cranial nerves intact, Gait normal. Reflexes normal and symmetric, with no pathologic reflex noted. No focal weakness. Normal sensation to light touch. Genitalia: deferred  Rectal: deferred    Results for orders placed or performed in visit on 02/13/20   TSH without Reflex   Result Value Ref Range    TSH 1.79 0.27 - 4.20 uIU/mL   Psa screening   Result Value Ref Range    PSA 0.46 0.00 - 4.00 ng/mL   Basic Metabolic Panel   Result Value Ref Range    Sodium 140 136 - 145 mmol/L    Potassium 4.8 3.5 - 5.1 mmol/L    Chloride 99 99 - 110 mmol/L    CO2 28 21 - 32 mmol/L    Anion Gap 13 3 - 16    Glucose 97 70 - 99 mg/dL    BUN 21 (H) 7 - 20 mg/dL    CREATININE 1.1 0.9 - 1.3 mg/dL    GFR Non-African American >60 >60    GFR African American >60 >60    Calcium 9.5 8.3 - 10.6 mg/dL   Hemoglobin A1C   Result Value Ref Range    Hemoglobin A1C 5.9 See comment %    eAG 122.6 mg/dL   Lipid Panel   Result Value Ref Range    Cholesterol, Total 180 0 - 199 mg/dL    Triglycerides 161 (H) 0 - 150 mg/dL    HDL 42 40 - 60 mg/dL    LDL Calculated 106 (H) <100 mg/dL    VLDL Cholesterol Calculated 32 Not Established mg/dL     All labs reviewed with patient. ASSESSMENT:   See encounter diagnoses  Encounter Diagnoses   Name Primary?     Essential hypertension Yes    Pure hypercholesterolemia     Pre-diabetes     Subclinical hypothyroidism     Class 3 severe obesity due to excess calories with serious comorbidity and body mass index (BMI) of 40.0 to 44.9 in adult [facility])    ? Peripheral neuropathy  Tinea versicolor likely    Plan:   See orders and medications filed with this encounter.   Labs  Encouraged covid vaccine  emg  Wt loss  Ortho prn

## 2021-06-30 LAB
ESTIMATED AVERAGE GLUCOSE: 122.6 MG/DL
HBA1C MFR BLD: 5.9 %

## 2021-07-02 ENCOUNTER — PROCEDURE VISIT (OUTPATIENT)
Dept: NEUROLOGY | Age: 61
End: 2021-07-02

## 2021-07-02 DIAGNOSIS — R10.84 GENERALIZED ABDOMINAL PAIN: Primary | ICD-10-CM

## 2021-07-06 NOTE — PROGRESS NOTES
HE WAS NOT A NO SHOW . He was called at 830 am and asked if he wanted seen, he stated yes, but asked how much procedure would be. No one here could tell him, he reached out to insurance and they couldn't tell him that day so he cancelled it. He is waiting to hear back from insurance before rescheduling.

## 2021-08-23 ENCOUNTER — TELEPHONE (OUTPATIENT)
Dept: FAMILY MEDICINE CLINIC | Age: 61
End: 2021-08-23

## 2021-11-15 DIAGNOSIS — R10.84 GENERALIZED ABDOMINAL PAIN: Primary | ICD-10-CM

## 2021-11-15 RX ORDER — ONDANSETRON 4 MG/1
4 TABLET, ORALLY DISINTEGRATING ORAL EVERY 8 HOURS PRN
Qty: 12 TABLET | Refills: 2 | Status: SHIPPED | OUTPATIENT
Start: 2021-11-15

## 2021-11-16 ENCOUNTER — HOSPITAL ENCOUNTER (OUTPATIENT)
Age: 61
Discharge: HOME | End: 2021-11-16
Payer: COMMERCIAL

## 2021-11-16 ENCOUNTER — HOSPITAL ENCOUNTER (OUTPATIENT)
Dept: ULTRASOUND IMAGING | Age: 61
Discharge: HOME | End: 2021-11-16
Payer: COMMERCIAL

## 2021-11-16 DIAGNOSIS — R10.84 GENERALIZED ABDOMINAL PAIN: Primary | ICD-10-CM

## 2021-11-16 DIAGNOSIS — R10.84 GENERALIZED ABDOMINAL PAIN: ICD-10-CM

## 2021-11-16 PROBLEM — K76.0 FATTY LIVER: Status: ACTIVE | Noted: 2021-11-16

## 2021-11-16 LAB
A/G RATIO: 1.7 (ref 1.1–2.2)
ALBUMIN SERPL-MCNC: 4.5 G/DL (ref 3.4–5)
ALP BLD-CCNC: 57 U/L (ref 40–129)
ALT SERPL-CCNC: 37 U/L (ref 10–40)
ANION GAP SERPL CALCULATED.3IONS-SCNC: 22 MMOL/L (ref 3–16)
AST SERPL-CCNC: 26 U/L (ref 15–37)
BASOPHILS ABSOLUTE: 0 K/UL (ref 0–0.2)
BASOPHILS RELATIVE PERCENT: 0.2 %
BILIRUB SERPL-MCNC: 1.4 MG/DL (ref 0–1)
BUN BLDV-MCNC: 27 MG/DL (ref 7–20)
BUN BLDV-MCNC: 28 MG/DL (ref 7–20)
CALCIUM SERPL-MCNC: 9.9 MG/DL (ref 8.3–10.6)
CHLORIDE BLD-SCNC: 92 MMOL/L (ref 99–110)
CO2: 23 MMOL/L (ref 21–32)
CREAT SERPL-MCNC: 1.3 MG/DL (ref 0.9–1.3)
EOSINOPHILS ABSOLUTE: 0.1 K/UL (ref 0–0.6)
EOSINOPHILS RELATIVE PERCENT: 0.7 %
GFR AFRICAN AMERICAN: >60
GFR NON-AFRICAN AMERICAN: 56
GLUCOSE BLD-MCNC: 132 MG/DL (ref 70–99)
HCT VFR BLD CALC: 48.8 % (ref 40.5–52.5)
HEMOGLOBIN: 16.4 G/DL (ref 13.5–17.5)
LIPASE: 36 U/L (ref 13–60)
LYMPHOCYTES ABSOLUTE: 0.7 K/UL (ref 1–5.1)
LYMPHOCYTES RELATIVE PERCENT: 9.1 %
MCH RBC QN AUTO: 30.4 PG (ref 26–34)
MCHC RBC AUTO-ENTMCNC: 33.7 G/DL (ref 31–36)
MCV RBC AUTO: 90.4 FL (ref 80–100)
MONOCYTES ABSOLUTE: 0.8 K/UL (ref 0–1.3)
MONOCYTES RELATIVE PERCENT: 10.4 %
NEUTROPHILS ABSOLUTE: 6.3 K/UL (ref 1.7–7.7)
NEUTROPHILS RELATIVE PERCENT: 79.6 %
PDW BLD-RTO: 13 % (ref 12.4–15.4)
PLATELET # BLD: 219 K/UL (ref 135–450)
PMV BLD AUTO: 7.3 FL (ref 5–10.5)
POTASSIUM SERPL-SCNC: 3.7 MMOL/L (ref 3.5–5.1)
RBC # BLD: 5.4 M/UL (ref 4.2–5.9)
SODIUM BLD-SCNC: 137 MMOL/L (ref 136–145)
TOTAL PROTEIN: 7.2 G/DL (ref 6.4–8.2)
WBC # BLD: 7.9 K/UL (ref 4–11)

## 2021-11-16 PROCEDURE — 85025 COMPLETE CBC W/AUTO DIFF WBC: CPT

## 2021-11-16 PROCEDURE — 36415 COLL VENOUS BLD VENIPUNCTURE: CPT

## 2021-11-16 PROCEDURE — 76700 US EXAM ABDOM COMPLETE: CPT

## 2021-11-16 PROCEDURE — 84520 ASSAY OF UREA NITROGEN: CPT

## 2021-11-16 PROCEDURE — 83690 ASSAY OF LIPASE: CPT

## 2022-01-02 ENCOUNTER — APPOINTMENT (OUTPATIENT)
Dept: GENERAL RADIOLOGY | Age: 62
DRG: 166 | End: 2022-01-02
Payer: COMMERCIAL

## 2022-01-02 ENCOUNTER — APPOINTMENT (OUTPATIENT)
Dept: CT IMAGING | Age: 62
DRG: 166 | End: 2022-01-02
Payer: COMMERCIAL

## 2022-01-02 ENCOUNTER — HOSPITAL ENCOUNTER (INPATIENT)
Age: 62
LOS: 3 days | Discharge: HOME | DRG: 166 | End: 2022-01-05
Attending: FAMILY MEDICINE | Admitting: FAMILY MEDICINE
Payer: COMMERCIAL

## 2022-01-02 DIAGNOSIS — J12.82 PNEUMONIA DUE TO COVID-19 VIRUS: ICD-10-CM

## 2022-01-02 DIAGNOSIS — U07.1 PNEUMONIA DUE TO COVID-19 VIRUS: ICD-10-CM

## 2022-01-02 DIAGNOSIS — I26.09 ACUTE PULMONARY EMBOLISM WITH ACUTE COR PULMONALE, UNSPECIFIED PULMONARY EMBOLISM TYPE (HCC): ICD-10-CM

## 2022-01-02 DIAGNOSIS — J96.01 ACUTE RESPIRATORY FAILURE WITH HYPOXIA (HCC): Primary | ICD-10-CM

## 2022-01-02 PROBLEM — I26.99 BILATERAL PULMONARY EMBOLISM (HCC): Status: ACTIVE | Noted: 2022-01-02

## 2022-01-02 LAB
A/G RATIO: 0.8 (ref 1.1–2.2)
ALBUMIN SERPL-MCNC: 3.2 G/DL (ref 3.4–5)
ALP BLD-CCNC: 52 U/L (ref 40–129)
ALT SERPL-CCNC: 43 U/L (ref 10–40)
ANION GAP SERPL CALCULATED.3IONS-SCNC: 11 MMOL/L (ref 3–16)
APTT: 56.6 SEC (ref 26.2–38.6)
AST SERPL-CCNC: 30 U/L (ref 15–37)
BASE EXCESS VENOUS: 3.4 MMOL/L (ref -3–3)
BASOPHILS ABSOLUTE: 0.1 K/UL (ref 0–0.2)
BASOPHILS RELATIVE PERCENT: 0.7 %
BILIRUB SERPL-MCNC: 0.7 MG/DL (ref 0–1)
BUN BLDV-MCNC: 20 MG/DL (ref 7–20)
C-REACTIVE PROTEIN: 69.9 MG/L (ref 0–5.1)
CALCIUM SERPL-MCNC: 9.5 MG/DL (ref 8.3–10.6)
CARBOXYHEMOGLOBIN: 4.7 % (ref 0–1.5)
CHLORIDE BLD-SCNC: 98 MMOL/L (ref 99–110)
CO2: 26 MMOL/L (ref 21–32)
CREAT SERPL-MCNC: 1 MG/DL (ref 0.9–1.3)
D DIMER: 4257 NG/ML DDU (ref 0–229)
EOSINOPHILS ABSOLUTE: 0.1 K/UL (ref 0–0.6)
EOSINOPHILS RELATIVE PERCENT: 0.8 %
GFR AFRICAN AMERICAN: >60
GFR NON-AFRICAN AMERICAN: >60
GLUCOSE BLD-MCNC: 155 MG/DL (ref 70–99)
HCO3 VENOUS: 28 MMOL/L (ref 23–29)
HCT VFR BLD CALC: 39.2 % (ref 40.5–52.5)
HEMOGLOBIN, VEN, REDUCED: 3 %
HEMOGLOBIN: 13.6 G/DL (ref 13.5–17.5)
INR BLD: 1.21 (ref 0.88–1.12)
LACTIC ACID: 2.5 MMOL/L (ref 0.4–2)
LYMPHOCYTES ABSOLUTE: 0.8 K/UL (ref 1–5.1)
LYMPHOCYTES RELATIVE PERCENT: 10.5 %
MCH RBC QN AUTO: 30.4 PG (ref 26–34)
MCHC RBC AUTO-ENTMCNC: 34.6 G/DL (ref 31–36)
MCV RBC AUTO: 88 FL (ref 80–100)
METHEMOGLOBIN VENOUS: 0 %
MONOCYTES ABSOLUTE: 0.6 K/UL (ref 0–1.3)
MONOCYTES RELATIVE PERCENT: 8.3 %
NEUTROPHILS ABSOLUTE: 6 K/UL (ref 1.7–7.7)
NEUTROPHILS RELATIVE PERCENT: 79.7 %
O2 CONTENT, VEN: 18 VOL %
O2 SAT, VEN: 97 %
O2 THERAPY: ABNORMAL
PCO2, VEN: 41.5 MMHG (ref 40–50)
PDW BLD-RTO: 12.6 % (ref 12.4–15.4)
PH VENOUS: 7.44 (ref 7.35–7.45)
PLATELET # BLD: 307 K/UL (ref 135–450)
PMV BLD AUTO: 6.7 FL (ref 5–10.5)
PO2, VEN: 78.3 MMHG (ref 25–40)
POTASSIUM SERPL-SCNC: 4 MMOL/L (ref 3.5–5.1)
PRO-BNP: 532 PG/ML (ref 0–124)
PROCALCITONIN: 0.06 NG/ML (ref 0–0.15)
PROTHROMBIN TIME: 13.8 SEC (ref 9.9–12.7)
RBC # BLD: 4.46 M/UL (ref 4.2–5.9)
SODIUM BLD-SCNC: 135 MMOL/L (ref 136–145)
TCO2 CALC VENOUS: 66 MMOL/L
TOTAL PROTEIN: 7 G/DL (ref 6.4–8.2)
TROPONIN: 0.29 NG/ML
WBC # BLD: 7.6 K/UL (ref 4–11)

## 2022-01-02 PROCEDURE — 36415 COLL VENOUS BLD VENIPUNCTURE: CPT

## 2022-01-02 PROCEDURE — 96374 THER/PROPH/DIAG INJ IV PUSH: CPT

## 2022-01-02 PROCEDURE — 83880 ASSAY OF NATRIURETIC PEPTIDE: CPT

## 2022-01-02 PROCEDURE — 96375 TX/PRO/DX INJ NEW DRUG ADDON: CPT

## 2022-01-02 PROCEDURE — 85379 FIBRIN DEGRADATION QUANT: CPT

## 2022-01-02 PROCEDURE — 85610 PROTHROMBIN TIME: CPT

## 2022-01-02 PROCEDURE — 83605 ASSAY OF LACTIC ACID: CPT

## 2022-01-02 PROCEDURE — 85025 COMPLETE CBC W/AUTO DIFF WBC: CPT

## 2022-01-02 PROCEDURE — 93005 ELECTROCARDIOGRAM TRACING: CPT | Performed by: PHYSICIAN ASSISTANT

## 2022-01-02 PROCEDURE — 94761 N-INVAS EAR/PLS OXIMETRY MLT: CPT

## 2022-01-02 PROCEDURE — 87040 BLOOD CULTURE FOR BACTERIA: CPT

## 2022-01-02 PROCEDURE — 2700000000 HC OXYGEN THERAPY PER DAY

## 2022-01-02 PROCEDURE — XW033E5 INTRODUCTION OF REMDESIVIR ANTI-INFECTIVE INTO PERIPHERAL VEIN, PERCUTANEOUS APPROACH, NEW TECHNOLOGY GROUP 5: ICD-10-PCS | Performed by: FAMILY MEDICINE

## 2022-01-02 PROCEDURE — 2580000003 HC RX 258: Performed by: PHYSICIAN ASSISTANT

## 2022-01-02 PROCEDURE — 1200000000 HC SEMI PRIVATE

## 2022-01-02 PROCEDURE — 94640 AIRWAY INHALATION TREATMENT: CPT

## 2022-01-02 PROCEDURE — 6370000000 HC RX 637 (ALT 250 FOR IP): Performed by: PHYSICIAN ASSISTANT

## 2022-01-02 PROCEDURE — 84484 ASSAY OF TROPONIN QUANT: CPT

## 2022-01-02 PROCEDURE — 99284 EMERGENCY DEPT VISIT MOD MDM: CPT

## 2022-01-02 PROCEDURE — 85730 THROMBOPLASTIN TIME PARTIAL: CPT

## 2022-01-02 PROCEDURE — 86140 C-REACTIVE PROTEIN: CPT

## 2022-01-02 PROCEDURE — 71260 CT THORAX DX C+: CPT

## 2022-01-02 PROCEDURE — 6360000004 HC RX CONTRAST MEDICATION: Performed by: PHYSICIAN ASSISTANT

## 2022-01-02 PROCEDURE — 80053 COMPREHEN METABOLIC PANEL: CPT

## 2022-01-02 PROCEDURE — 82803 BLOOD GASES ANY COMBINATION: CPT

## 2022-01-02 PROCEDURE — 6360000002 HC RX W HCPCS: Performed by: PHYSICIAN ASSISTANT

## 2022-01-02 PROCEDURE — 71045 X-RAY EXAM CHEST 1 VIEW: CPT

## 2022-01-02 PROCEDURE — 84145 PROCALCITONIN (PCT): CPT

## 2022-01-02 RX ORDER — HEPARIN SODIUM 1000 [USP'U]/ML
4000 INJECTION, SOLUTION INTRAVENOUS; SUBCUTANEOUS PRN
Status: DISCONTINUED | OUTPATIENT
Start: 2022-01-02 | End: 2022-01-04

## 2022-01-02 RX ORDER — ONDANSETRON 2 MG/ML
4 INJECTION INTRAMUSCULAR; INTRAVENOUS EVERY 6 HOURS PRN
Status: DISCONTINUED | OUTPATIENT
Start: 2022-01-02 | End: 2022-01-05 | Stop reason: HOSPADM

## 2022-01-02 RX ORDER — ACETAMINOPHEN 650 MG/1
650 SUPPOSITORY RECTAL EVERY 6 HOURS PRN
Status: DISCONTINUED | OUTPATIENT
Start: 2022-01-02 | End: 2022-01-05 | Stop reason: HOSPADM

## 2022-01-02 RX ORDER — ONDANSETRON 4 MG/1
4 TABLET, ORALLY DISINTEGRATING ORAL EVERY 8 HOURS PRN
Status: DISCONTINUED | OUTPATIENT
Start: 2022-01-02 | End: 2022-01-05 | Stop reason: HOSPADM

## 2022-01-02 RX ORDER — ASPIRIN 81 MG/1
81 TABLET ORAL DAILY
Status: DISCONTINUED | OUTPATIENT
Start: 2022-01-03 | End: 2022-01-05 | Stop reason: HOSPADM

## 2022-01-02 RX ORDER — SODIUM CHLORIDE 0.9 % (FLUSH) 0.9 %
5-40 SYRINGE (ML) INJECTION EVERY 12 HOURS SCHEDULED
Status: DISCONTINUED | OUTPATIENT
Start: 2022-01-02 | End: 2022-01-05 | Stop reason: HOSPADM

## 2022-01-02 RX ORDER — SODIUM CHLORIDE 9 MG/ML
25 INJECTION, SOLUTION INTRAVENOUS PRN
Status: DISCONTINUED | OUTPATIENT
Start: 2022-01-02 | End: 2022-01-05 | Stop reason: HOSPADM

## 2022-01-02 RX ORDER — HEPARIN SODIUM 1000 [USP'U]/ML
8000 INJECTION, SOLUTION INTRAVENOUS; SUBCUTANEOUS ONCE
Status: COMPLETED | OUTPATIENT
Start: 2022-01-02 | End: 2022-01-02

## 2022-01-02 RX ORDER — AMLODIPINE BESYLATE 10 MG/1
10 TABLET ORAL DAILY
COMMUNITY
End: 2022-08-22

## 2022-01-02 RX ORDER — ASPIRIN 81 MG/1
324 TABLET, CHEWABLE ORAL ONCE
Status: COMPLETED | OUTPATIENT
Start: 2022-01-02 | End: 2022-01-02

## 2022-01-02 RX ORDER — ONDANSETRON 4 MG/1
4 TABLET, ORALLY DISINTEGRATING ORAL EVERY 8 HOURS PRN
Status: DISCONTINUED | OUTPATIENT
Start: 2022-01-02 | End: 2022-01-02

## 2022-01-02 RX ORDER — DEXAMETHASONE SODIUM PHOSPHATE 10 MG/ML
6 INJECTION, SOLUTION INTRAMUSCULAR; INTRAVENOUS ONCE
Status: COMPLETED | OUTPATIENT
Start: 2022-01-02 | End: 2022-01-02

## 2022-01-02 RX ORDER — POLYETHYLENE GLYCOL 3350 17 G/17G
17 POWDER, FOR SOLUTION ORAL DAILY PRN
Status: DISCONTINUED | OUTPATIENT
Start: 2022-01-02 | End: 2022-01-05 | Stop reason: HOSPADM

## 2022-01-02 RX ORDER — HEPARIN SODIUM 10000 [USP'U]/100ML
5-30 INJECTION, SOLUTION INTRAVENOUS CONTINUOUS
Status: DISCONTINUED | OUTPATIENT
Start: 2022-01-02 | End: 2022-01-04

## 2022-01-02 RX ORDER — LANOLIN ALCOHOL/MO/W.PET/CERES
3 CREAM (GRAM) TOPICAL NIGHTLY PRN
Status: DISCONTINUED | OUTPATIENT
Start: 2022-01-02 | End: 2022-01-05 | Stop reason: HOSPADM

## 2022-01-02 RX ORDER — HEPARIN SODIUM 1000 [USP'U]/ML
8000 INJECTION, SOLUTION INTRAVENOUS; SUBCUTANEOUS PRN
Status: DISCONTINUED | OUTPATIENT
Start: 2022-01-02 | End: 2022-01-04

## 2022-01-02 RX ORDER — ACETAMINOPHEN 325 MG/1
650 TABLET ORAL EVERY 6 HOURS PRN
Status: DISCONTINUED | OUTPATIENT
Start: 2022-01-02 | End: 2022-01-04 | Stop reason: SDUPTHER

## 2022-01-02 RX ORDER — DEXAMETHASONE SODIUM PHOSPHATE 10 MG/ML
6 INJECTION, SOLUTION INTRAMUSCULAR; INTRAVENOUS DAILY
Status: DISCONTINUED | OUTPATIENT
Start: 2022-01-03 | End: 2022-01-03

## 2022-01-02 RX ORDER — SODIUM CHLORIDE 0.9 % (FLUSH) 0.9 %
5-40 SYRINGE (ML) INJECTION PRN
Status: DISCONTINUED | OUTPATIENT
Start: 2022-01-02 | End: 2022-01-05 | Stop reason: HOSPADM

## 2022-01-02 RX ORDER — AMLODIPINE BESYLATE 5 MG/1
10 TABLET ORAL DAILY
Status: DISCONTINUED | OUTPATIENT
Start: 2022-01-03 | End: 2022-01-05 | Stop reason: HOSPADM

## 2022-01-02 RX ORDER — MORPHINE SULFATE 2 MG/ML
2 INJECTION, SOLUTION INTRAMUSCULAR; INTRAVENOUS EVERY 4 HOURS PRN
Status: DISCONTINUED | OUTPATIENT
Start: 2022-01-02 | End: 2022-01-05 | Stop reason: HOSPADM

## 2022-01-02 RX ORDER — IPRATROPIUM BROMIDE AND ALBUTEROL SULFATE 2.5; .5 MG/3ML; MG/3ML
1 SOLUTION RESPIRATORY (INHALATION) ONCE
Status: COMPLETED | OUTPATIENT
Start: 2022-01-02 | End: 2022-01-02

## 2022-01-02 RX ORDER — LEVOTHYROXINE SODIUM 0.07 MG/1
75 TABLET ORAL DAILY
Status: DISCONTINUED | OUTPATIENT
Start: 2022-01-03 | End: 2022-01-05 | Stop reason: HOSPADM

## 2022-01-02 RX ADMIN — IOPAMIDOL 75 ML: 755 INJECTION, SOLUTION INTRAVENOUS at 17:00

## 2022-01-02 RX ADMIN — Medication 15 UNITS/KG/HR: at 18:02

## 2022-01-02 RX ADMIN — IPRATROPIUM BROMIDE AND ALBUTEROL SULFATE 1 AMPULE: .5; 3 SOLUTION RESPIRATORY (INHALATION) at 15:38

## 2022-01-02 RX ADMIN — HEPARIN SODIUM 8000 UNITS: 1000 INJECTION INTRAVENOUS; SUBCUTANEOUS at 17:59

## 2022-01-02 RX ADMIN — ASPIRIN 81 MG 324 MG: 81 TABLET ORAL at 16:19

## 2022-01-02 RX ADMIN — MELATONIN TAB 3 MG 3 MG: 3 TAB at 22:32

## 2022-01-02 RX ADMIN — DEXAMETHASONE SODIUM PHOSPHATE 6 MG: 10 INJECTION, SOLUTION INTRAMUSCULAR; INTRAVENOUS at 16:18

## 2022-01-02 ASSESSMENT — ENCOUNTER SYMPTOMS
BACK PAIN: 0
COLOR CHANGE: 0
NAUSEA: 0
DIARRHEA: 0
CONSTIPATION: 0
STRIDOR: 0
VOMITING: 0
WHEEZING: 0
ABDOMINAL PAIN: 0
SHORTNESS OF BREATH: 1
COUGH: 1

## 2022-01-02 NOTE — ED TRIAGE NOTES
Ambulated pt in [place] from [place] [id number] to [facility] 3 restroo, O2 sat dropped to 88%. O2 @ 3L applied per NC on return to room. Pt stopped twice to catch breath while ambulating.

## 2022-01-02 NOTE — ED PROVIDER NOTES
[address] [facility]        Pt Name: Claudette Staton  MRN: [id number]  [place] 1960  Date of evaluation: 1/2/2022  Provider: Russell Gonzales PA-C  PCP: Armani Ramos MD  Note Started: 3:13 PM EST       FRANCESCO. I have evaluated this patient. My supervising physician was available for consultation. CHIEF COMPLAINT       Chief Complaint   Patient presents with    Shortness of Breath     pt c/o COVId symptoms since before Riana. states he was doing well until this morning when he got up and felt like he couldn't breath. HISTORY OF PRESENT ILLNESS   (Location, Timing/Onset, Context/Setting, Quality, Duration, Modifying Factors, Severity, Associated Signs and Symptoms)  Note limiting factors. Chief Complaint: Shortness of breath    Claudette Staton is a 63 y.o. male who presents to the emergency department complaining of cough and congestion since around Riana time. Last week he had a positive Covid rapid test at home. He also has known exposures. Today, he feels like his shortness of breath has gotten worse. With ambulation, he becomes hypoxic to 88% on room air. He denies history of asthma, pneumonia or COPD. He does not smoke. He did not receive his Covid vaccinations. Denies persistent fever or chills. Denies chest pain, hemoptysis, palpitations, pain or swelling in extremities. Nursing Notes were all reviewed and agreed with or any disagreements were addressed in the HPI. REVIEW OF SYSTEMS    (2-9 systems for level 4, 10 or more for level 5)     Review of Systems   Constitutional: Positive for fatigue. Negative for chills and fever. HENT: Negative. Eyes: Negative for visual disturbance. Respiratory: Positive for cough and shortness of breath. Negative for wheezing and stridor. Cardiovascular: Negative for chest pain, palpitations and leg swelling.    Gastrointestinal: Negative for abdominal pain, constipation, diarrhea, nausea and vomiting. Genitourinary: Negative. Musculoskeletal: Negative for back pain, neck pain and neck stiffness. Skin: Negative for color change, pallor, rash and wound. Neurological: Negative for dizziness, tremors, seizures, syncope, facial asymmetry, speech difficulty, weakness, light-headedness, numbness and headaches. Psychiatric/Behavioral: Negative for confusion. All other systems reviewed and are negative. Positives and Pertinent negatives as per HPI. Except as noted above in the ROS, all other systems were reviewed and negative. PAST MEDICAL HISTORY     Past Medical History:   Diagnosis Date    Bone marrow donor     x 2    Fatty liver 11/16/2021    HTN (hypertension) 5/23/2012    HTN (hypertension) 5/23/2012    Hyperlipidemia     Hyperlipidemia     Hypertension     Lumbar herniated disc     x 3    Obesity     Pre-diabetes 6/4/2014    Subclinical hypothyroidism 6/4/2014         SURGICAL HISTORY     Past Surgical History:   Procedure Laterality Date    COLONOSCOPY  01/16/2012    COLONOSCOPY  10/14/2014    KNEE SURGERY      OTHER SURGICAL HISTORY      epidural injection x 2    TOTAL HIP ARTHROPLASTY Right 2017    Dr Adilia Cagle       Previous Medications    AMLODIPINE (NORVASC) 5 MG TABLET    TAKE 1 TABLET NIGHTLY    ASPIRIN 81 MG TABLET    Take 81 mg by mouth daily    CELECOXIB (CELEBREX) 200 MG CAPSULE    Take 1 capsule by mouth daily    ETODOLAC (LODINE) 200 MG CAPSULE    Take 1 capsule by mouth every 8 hours as needed (pain) Take with food    LEVOTHYROXINE (SYNTHROID) 75 MCG TABLET    1 po qam    LOSARTAN-HYDROCHLOROTHIAZIDE (HYZAAR) 100-25 MG PER TABLET    1 po qam    ONDANSETRON (ZOFRAN ODT) 4 MG DISINTEGRATING TABLET    Take 1 tablet by mouth every 8 hours as needed for Nausea or Vomiting         ALLERGIES     Patient has no known allergies.     FAMILYHISTORY       Family History Problem Relation Age of Onset    High Blood Pressure Mother     High Blood Pressure Father     Cancer Father         prostate, melanoma    Cancer Sister         leukenia    Cancer Sister         melanoma    Cancer Sister         leukemia          SOCIAL HISTORY       Social History     Tobacco Use    Smoking status: Never Smoker    Smokeless tobacco: Never Used   Substance Use Topics    Alcohol use: Yes     Alcohol/week: 4.0 - 5.0 standard drinks     Types: 4 - 5 Cans of beer per week     Comment: occasional    Drug use: No       SCREENINGS             PHYSICAL EXAM    (up to 7 for level 4, 8 or more for level 5)     ED Triage Vitals [01/02/22 1449]   BP Temp Temp Source Pulse Resp SpO2 Height Weight   (!) 145/87 97.9 °F (36.6 °C) Infrared 99 30 93 % 6' (1.829 m) (!) 380 lb (172.4 kg)       Physical Exam  Vitals and nursing note reviewed. Constitutional:       Appearance: Normal appearance. He is well-developed. He is not toxic-appearing or diaphoretic. HENT:      Head: Normocephalic and atraumatic. Right Ear: External ear normal.      Left Ear: External ear normal.      Nose: Nose normal.      Mouth/Throat:      Mouth: Mucous membranes are moist.      Pharynx: Oropharynx is clear. Eyes:      General: No scleral icterus. Right eye: No discharge. Left eye: No discharge. Extraocular Movements: Extraocular movements intact. Conjunctiva/sclera: Conjunctivae normal.      Pupils: Pupils are equal, round, and reactive to light. Cardiovascular:      Rate and Rhythm: Normal rate. Pulmonary:      Effort: Pulmonary effort is normal.      Breath sounds: No stridor. Wheezing present. No rhonchi or rales. Abdominal:      General: Bowel sounds are normal.      Palpations: Abdomen is soft. Tenderness: There is no abdominal tenderness. There is no right CVA tenderness or left CVA tenderness. Musculoskeletal:         General: Normal range of motion.       Cervical back: Normal range of motion. Comments: Trace symmetrical ankle edema. No posterior calf or thigh tenderness, palpable cord, discoloration. Negative homans. Skin:     General: Skin is warm and dry. Capillary Refill: Capillary refill takes less than 2 seconds. Coloration: Skin is not jaundiced or pale. Findings: No bruising, erythema, lesion or rash. Neurological:      General: No focal deficit present. Mental Status: He is alert and oriented to person, place, and time.    Psychiatric:         Mood and Affect: Mood normal.         Behavior: Behavior normal.         DIAGNOSTIC RESULTS   LABS:    Labs Reviewed   CBC WITH AUTO DIFFERENTIAL - Abnormal; Notable for the following components:       Result Value    Hematocrit 39.2 (*)     Lymphocytes Absolute 0.8 (*)     All other components within normal limits    Narrative:     Performed at:  [facility] [address], [place]   Phone [phone]   COMPREHENSIVE METABOLIC PANEL - Abnormal; Notable for the following components:    Sodium 135 (*)     Chloride 98 (*)     Glucose 155 (*)     Albumin 3.2 (*)     Albumin/Globulin Ratio 0.8 (*)     ALT 43 (*)     All other components within normal limits    Narrative:     Performed at:  [facility] [address], [place]   Phone [phone]   TROPONIN - Abnormal; Notable for the following components:    Troponin 0.29 (*)     All other components within normal limits    Narrative:     Performed at:  [facility] [address]   [address]  - Abnormal; Notable for the following components:    Pro- (*)     All other components within normal limits    Narrative:     Performed at:  [facility] [address],  [place] [employer]   Phone [phone]   D-DIMER, QUANTITATIVE - Abnormal; Notable for the following components:    D-Dimer, Quant 4257 (*)     All other components within normal limits    Narrative:     Performed at:  [facility] [address] [employer], [place]   Phone [phone]   PROTIME-INR - Abnormal; Notable for the following components:    Protime 13.8 (*)     INR 1.21 (*)     All other components within normal limits    Narrative:     Performed at:  [facility] [address] [employer]  [facility] [employer]   Phone [phone]   APTT - Abnormal; Notable for the following components:    aPTT 56.6 (*)     All other components within normal limits    Narrative:     Performed at:  [facility] [address] [facility] [employer], [place]   Phone [phone]   LACTIC ACID, PLASMA - Abnormal; Notable for the following components:    Lactic Acid 2.5 (*)     All other components within normal limits    Narrative:     Performed at:  [facility] [address] [employer], [place]   Phone [phone]   BLOOD GAS, VENOUS - Abnormal; Notable for the following components:    pO2, Cesar 78.3 (*)     Base Excess, Cesar 3.4 (*)     Carboxyhemoglobin 4.7 (*)     All other components within normal limits    Narrative:     Performed at:  [facility] [address] [employer], [place]   Phone [phone]   C-REACTIVE PROTEIN - Abnormal; Notable for the following components:    CRP 69.9 (*)     All other components within normal limits    Narrative:     Performed at:  [facility] [address] [employer] [address], [place]   Phone [phone]   CULTURE, BLOOD 2   CULTURE, BLOOD 1   PROCALCITONIN    Narrative:     Performed at:  [facility] [address] [employer] [address], [place]   Phone [phone]   LACTIC ACID, PLASMA   CBC   APTT   APTT   APTT       When ordered only abnormal lab results are displayed. All other labs were within normal range or not returned as of this dictation. EKG: When ordered, EKG's are interpreted by the Emergency Department Physician in the absence of a cardiologist.  Please see their note for interpretation of EKG. RADIOLOGY:   Non-plain film images such as CT, Ultrasound and MRI are read by the radiologist. Plain radiographic images are visualized and preliminarily interpreted by the ED Provider with the below findings:        Interpretation per the Radiologist below, if available at the time of this note:    CT CHEST PULMONARY EMBOLISM W CONTRAST   Final Result   Bilateral pulmonary emboli      Right ventricle dilated compared to the left suggesting right ventricular   heart strain      Findings were discussed with Oscar Hussein at 5:16 pm on 2022. RECOMMENDATIONS:   Unavailable         XR CHEST PORTABLE   Final Result   Bilateral airspace opacities suggesting COVID pneumonia                 PROCEDURES   Unless otherwise noted below, none     Procedures    CRITICAL CARE TIME   Critical Care  There was a high probability of life-threatening clinical deterioration in the patient's condition requiring my urgent intervention. Total critical care time with the patient was 75 minutes excluding separately reportable procedures. Critical care required due to patients covid pneumonia, PE with right heart strain prompting medical management, consultation and admission. SEP-1 CORE MEASURE DATA      Sepsis Criteria   Severe Sepsis Criteria   Septic Shock Criteria     Must be confirmed or suspected to move forward with diagnosis of sepsis. Must meet 2:    [] Temperature > 100.9 F (38.3 C)        or < 96.8 F (36 C)  [] HR > 90  [] RR > 20  [] WBC > 12 or < 4 or 10% bands    AND:    [] Infection Confirmed or        Suspected.     OR:    [x] Exclude from SEP-1 because:    [] No infection present or suspected  [] Does not have 2+ SIRS criteria but may have an incidental infection that requires treatment  [] May have sepsis, but does not meet criteria for severe sepsis or septic shock  [] Alternative explanation for abnormal labs and/or vitals (see MDM)  [x] Viral etiology found or highly suspected (including COVID-19) without concomitant bacterial infection   Must meet 1:    [] Lactate > 2       or   [] Signs of Organ Dysfunction:    - SBP < 90 or MAP < 65  - Altered mental status  - Creatinine > 2 or increased from      baseline  - Urine Output < 0.5 ml/kg/hr  - Bilirubin > 2  - INR > 1.5 (not anticoagulated)  - Platelets < 665,868  - Acute Respiratory Failure as     evidenced by new need for NIPPV     or mechanical ventilation        [] No criteria met for Severe Sepsis. Must meet 1:    [] Lactate > 4        or   [] SBP < 90 or MAP < 65 for at        least two readings in the first        hour after fluid bolus        administration      [] Vasopressors initiated (if hypotension persists after fluid resuscitation)                [] No criteria met for Septic Shock. Patient Vitals for the past 6 hrs:   BP Temp Pulse Resp SpO2 Height Weight Weight Method Percent Weight Change   01/02/22 1449 (!) 145/87 97.9 °F (36.6 °C) 99 30 93 % 6' (1.829 m) (!) 380 lb (172.4 kg) Stated 0      Recent Labs     01/02/22  1516   WBC 7.6   LACTA 2.5*   CREATININE 1.0   BILITOT 0.7   INR 1.21*              CONSULTS:  I spoke with Dr. Judi Flores, radiologist, at 3924. We discussed the CTPA findings. IP CONSULT TO VASCULAR SURGERY  IP CONSULT TO HOSPITALIST  I spoke with Dr. García Jacobo, vascular surgeon, at [phone]. He agrees with plan for heparin. No emergent plan for ECOS tonight but will reassess the patient tomorrow. He would like for the admitting physician to order an ECHOcardiogram tomorrow morning.  This was communicated to hospitalist.     EMERGENCY DEPARTMENT COURSE and DIFFERENTIAL DIAGNOSIS/MDM:   Vitals:    Vitals:    01/02/22 1449   BP: (!) 145/87 Pulse: 99   Resp: 30   Temp: 97.9 °F (36.6 °C)   TempSrc: Infrared   SpO2: 93%   Weight: (!) 380 lb (172.4 kg)   Height: 6' (1.829 m)       Patient was given the following medications:  Medications   heparin (porcine) injection 8,000 Units (has no administration in time range)   heparin (porcine) injection 8,000 Units (has no administration in time range)   heparin (porcine) injection 4,000 Units (has no administration in time range)   heparin 25,000 units in dextrose 5% 250 mL (premix) infusion (has no administration in time range)   dexamethasone (PF) (DECADRON) injection 6 mg (6 mg IntraVENous Given 1/2/22 1618)   ipratropium-albuterol (DUONEB) nebulizer solution 1 ampule (1 ampule Inhalation Given 1/2/22 1538)   aspirin chewable tablet 324 mg (324 mg Oral Given 1/2/22 1619)   iopamidol (ISOVUE-370) 76 % injection 75 mL (75 mLs IntraVENous Given 1/2/22 1700)           This patient presents to the emergency department complaining of worsening shortness of breath after recent diagnosis of COVID-19. He is hypoxic with ambulation and therefore we do feel admission is warranted at this time. Patient understands and agrees with plan. covid pneumonia and PE with right heart strain is seen on CTPA therefore I did order anticoagulant agent with consultation with vascular surgeon. FINAL IMPRESSION      1. Acute respiratory failure with hypoxia ([address]    2. Pneumonia due to COVID-19 virus    3. Acute pulmonary embolism with acute cor pulmonale, unspecified pulmonary embolism type [facility])          DISPOSITION/PLAN   DISPOSITION Decision To Admit 01/02/2022 05:18:20 PM      PATIENT REFERRED TO:  No follow-up provider specified.     DISCHARGE MEDICATIONS:  New Prescriptions    No medications on file       DISCONTINUED MEDICATIONS:  Discontinued Medications    AMLODIPINE (NORVASC) 10 MG TABLET    TAKE 1 TABLET DAILY    AMLODIPINE (NORVASC) 10 MG TABLET    Take 1 tablet by mouth daily              (Please note that portions of this note were completed with a voice recognition program.  Efforts were made to edit the dictations but occasionally words are mis-transcribed.)    Vaishali Queen PA-C (electronically signed)           Vaishali Queen PA-C  01/02/22 5635

## 2022-01-02 NOTE — ED NOTES
Bed: 09  Expected date:   Expected time:   Means of arrival:   Comments:  Leeroy Fowler 136, RN  01/02/22 5964

## 2022-01-02 NOTE — ED PROVIDER NOTES
EKG is reviewed myself. Dated today at 12.   Rate 100 sinus rhythm nonspecific T wave changes without significant change compared to thousand [id number] Guaman MD  01/02/22 0114

## 2022-01-03 ENCOUNTER — APPOINTMENT (OUTPATIENT)
Dept: GENERAL RADIOLOGY | Age: 62
DRG: 166 | End: 2022-01-03
Payer: COMMERCIAL

## 2022-01-03 PROBLEM — I26.09 ACUTE PULMONARY EMBOLISM WITH ACUTE COR PULMONALE (HCC): Status: ACTIVE | Noted: 2022-01-02

## 2022-01-03 LAB
ANION GAP SERPL CALCULATED.3IONS-SCNC: 14 MMOL/L (ref 3–16)
APTT: 131.8 SEC (ref 26.2–38.6)
APTT: 47.2 SEC (ref 26.2–38.6)
APTT: 47.3 SEC (ref 26.2–38.6)
BUN BLDV-MCNC: 18 MG/DL (ref 7–20)
C-REACTIVE PROTEIN: 51.6 MG/L (ref 0–5.1)
CALCIUM SERPL-MCNC: 9.3 MG/DL (ref 8.3–10.6)
CHLORIDE BLD-SCNC: 99 MMOL/L (ref 99–110)
CO2: 24 MMOL/L (ref 21–32)
CREAT SERPL-MCNC: 1 MG/DL (ref 0.9–1.3)
D DIMER: 2371 NG/ML DDU (ref 0–229)
EKG ATRIAL RATE: 100 BPM
EKG DIAGNOSIS: NORMAL
EKG P AXIS: 45 DEGREES
EKG P-R INTERVAL: 148 MS
EKG Q-T INTERVAL: 346 MS
EKG QRS DURATION: 90 MS
EKG QTC CALCULATION (BAZETT): 446 MS
EKG R AXIS: 24 DEGREES
EKG T AXIS: 18 DEGREES
EKG VENTRICULAR RATE: 100 BPM
FIBRINOGEN: 543 MG/DL (ref 200–397)
GFR AFRICAN AMERICAN: >60
GFR NON-AFRICAN AMERICAN: >60
GLUCOSE BLD-MCNC: 152 MG/DL (ref 70–99)
LACTIC ACID: 2.7 MMOL/L (ref 0.4–2)
LV EF: 63 %
LVEF MODALITY: NORMAL
POTASSIUM SERPL-SCNC: 4.7 MMOL/L (ref 3.5–5.1)
SARS-COV-2, PCR: DETECTED
SODIUM BLD-SCNC: 137 MMOL/L (ref 136–145)

## 2022-01-03 PROCEDURE — C1751 CATH, INF, PER/CENT/MIDLINE: HCPCS

## 2022-01-03 PROCEDURE — 80048 BASIC METABOLIC PNL TOTAL CA: CPT

## 2022-01-03 PROCEDURE — 6370000000 HC RX 637 (ALT 250 FOR IP): Performed by: FAMILY MEDICINE

## 2022-01-03 PROCEDURE — 85384 FIBRINOGEN ACTIVITY: CPT

## 2022-01-03 PROCEDURE — 99253 IP/OBS CNSLTJ NEW/EST LOW 45: CPT | Performed by: SURGERY

## 2022-01-03 PROCEDURE — 99223 1ST HOSP IP/OBS HIGH 75: CPT | Performed by: INTERNAL MEDICINE

## 2022-01-03 PROCEDURE — U0003 INFECTIOUS AGENT DETECTION BY NUCLEIC ACID (DNA OR RNA); SEVERE ACUTE RESPIRATORY SYNDROME CORONAVIRUS 2 (SARS-COV-2) (CORONAVIRUS DISEASE [COVID-19]), AMPLIFIED PROBE TECHNIQUE, MAKING USE OF HIGH THROUGHPUT TECHNOLOGIES AS DESCRIBED BY CMS-2020-01-R: HCPCS

## 2022-01-03 PROCEDURE — 71045 X-RAY EXAM CHEST 1 VIEW: CPT

## 2022-01-03 PROCEDURE — 6360000002 HC RX W HCPCS: Performed by: PHYSICIAN ASSISTANT

## 2022-01-03 PROCEDURE — 83605 ASSAY OF LACTIC ACID: CPT

## 2022-01-03 PROCEDURE — 86140 C-REACTIVE PROTEIN: CPT

## 2022-01-03 PROCEDURE — 2580000003 HC RX 258: Performed by: NURSE PRACTITIONER

## 2022-01-03 PROCEDURE — 2700000000 HC OXYGEN THERAPY PER DAY

## 2022-01-03 PROCEDURE — 1200000000 HC SEMI PRIVATE

## 2022-01-03 PROCEDURE — 36569 INSJ PICC 5 YR+ W/O IMAGING: CPT

## 2022-01-03 PROCEDURE — 6370000000 HC RX 637 (ALT 250 FOR IP): Performed by: NURSE PRACTITIONER

## 2022-01-03 PROCEDURE — 6360000002 HC RX W HCPCS: Performed by: FAMILY MEDICINE

## 2022-01-03 PROCEDURE — 2580000003 HC RX 258: Performed by: FAMILY MEDICINE

## 2022-01-03 PROCEDURE — 85730 THROMBOPLASTIN TIME PARTIAL: CPT

## 2022-01-03 PROCEDURE — 93306 TTE W/DOPPLER COMPLETE: CPT

## 2022-01-03 PROCEDURE — 85379 FIBRIN DEGRADATION QUANT: CPT

## 2022-01-03 PROCEDURE — APPNB30 APP NON BILLABLE TIME 0-30 MINS: Performed by: NURSE PRACTITIONER

## 2022-01-03 PROCEDURE — 02HV33Z INSERTION OF INFUSION DEVICE INTO SUPERIOR VENA CAVA, PERCUTANEOUS APPROACH: ICD-10-PCS | Performed by: RADIOLOGY

## 2022-01-03 PROCEDURE — 94761 N-INVAS EAR/PLS OXIMETRY MLT: CPT

## 2022-01-03 PROCEDURE — APPSS60 APP SPLIT SHARED TIME 46-60 MINUTES: Performed by: NURSE PRACTITIONER

## 2022-01-03 PROCEDURE — U0005 INFEC AGEN DETEC AMPLI PROBE: HCPCS

## 2022-01-03 PROCEDURE — 6370000000 HC RX 637 (ALT 250 FOR IP): Performed by: PHYSICIAN ASSISTANT

## 2022-01-03 PROCEDURE — 93010 ELECTROCARDIOGRAM REPORT: CPT | Performed by: INTERNAL MEDICINE

## 2022-01-03 PROCEDURE — 99255 IP/OBS CONSLTJ NEW/EST HI 80: CPT | Performed by: INTERNAL MEDICINE

## 2022-01-03 PROCEDURE — 94640 AIRWAY INHALATION TREATMENT: CPT

## 2022-01-03 RX ORDER — BUDESONIDE AND FORMOTEROL FUMARATE DIHYDRATE 160; 4.5 UG/1; UG/1
2 AEROSOL RESPIRATORY (INHALATION) 2 TIMES DAILY
Status: DISCONTINUED | OUTPATIENT
Start: 2022-01-03 | End: 2022-01-05 | Stop reason: HOSPADM

## 2022-01-03 RX ORDER — ALBUTEROL SULFATE 90 UG/1
2 AEROSOL, METERED RESPIRATORY (INHALATION) 4 TIMES DAILY
Status: DISCONTINUED | OUTPATIENT
Start: 2022-01-03 | End: 2022-01-05 | Stop reason: HOSPADM

## 2022-01-03 RX ORDER — SODIUM CHLORIDE 9 MG/ML
INJECTION, SOLUTION INTRAVENOUS CONTINUOUS
Status: DISCONTINUED | OUTPATIENT
Start: 2022-01-04 | End: 2022-01-05

## 2022-01-03 RX ORDER — SODIUM CHLORIDE 9 MG/ML
25 INJECTION, SOLUTION INTRAVENOUS PRN
Status: DISCONTINUED | OUTPATIENT
Start: 2022-01-03 | End: 2022-01-05 | Stop reason: HOSPADM

## 2022-01-03 RX ORDER — DEXAMETHASONE SODIUM PHOSPHATE 10 MG/ML
10 INJECTION, SOLUTION INTRAMUSCULAR; INTRAVENOUS DAILY
Status: DISCONTINUED | OUTPATIENT
Start: 2022-01-04 | End: 2022-01-05 | Stop reason: HOSPADM

## 2022-01-03 RX ORDER — SODIUM CHLORIDE 0.9 % (FLUSH) 0.9 %
5-40 SYRINGE (ML) INJECTION PRN
Status: DISCONTINUED | OUTPATIENT
Start: 2022-01-03 | End: 2022-01-05 | Stop reason: HOSPADM

## 2022-01-03 RX ORDER — CODEINE PHOSPHATE AND GUAIFENESIN 10; 100 MG/5ML; MG/5ML
5 SOLUTION ORAL EVERY 4 HOURS PRN
Status: DISCONTINUED | OUTPATIENT
Start: 2022-01-03 | End: 2022-01-05 | Stop reason: HOSPADM

## 2022-01-03 RX ORDER — LIDOCAINE HYDROCHLORIDE 10 MG/ML
5 INJECTION, SOLUTION EPIDURAL; INFILTRATION; INTRACAUDAL; PERINEURAL ONCE
Status: DISCONTINUED | OUTPATIENT
Start: 2022-01-03 | End: 2022-01-05 | Stop reason: HOSPADM

## 2022-01-03 RX ORDER — BENZONATATE 100 MG/1
200 CAPSULE ORAL 3 TIMES DAILY
Status: DISCONTINUED | OUTPATIENT
Start: 2022-01-03 | End: 2022-01-05 | Stop reason: HOSPADM

## 2022-01-03 RX ORDER — SODIUM CHLORIDE 0.9 % (FLUSH) 0.9 %
5-40 SYRINGE (ML) INJECTION EVERY 12 HOURS SCHEDULED
Status: DISCONTINUED | OUTPATIENT
Start: 2022-01-03 | End: 2022-01-05

## 2022-01-03 RX ADMIN — Medication 2 PUFF: at 19:40

## 2022-01-03 RX ADMIN — HEPARIN SODIUM 4000 UNITS: 1000 INJECTION INTRAVENOUS; SUBCUTANEOUS at 22:06

## 2022-01-03 RX ADMIN — MELATONIN TAB 3 MG 3 MG: 3 TAB at 22:30

## 2022-01-03 RX ADMIN — BENZONATATE 200 MG: 100 CAPSULE ORAL at 20:11

## 2022-01-03 RX ADMIN — Medication 2 PUFF: at 13:16

## 2022-01-03 RX ADMIN — BENZONATATE 200 MG: 100 CAPSULE ORAL at 14:19

## 2022-01-03 RX ADMIN — Medication 2 PUFF: at 19:44

## 2022-01-03 RX ADMIN — Medication 10 ML: at 08:20

## 2022-01-03 RX ADMIN — Medication 14.2 UNITS/KG/HR: at 14:26

## 2022-01-03 RX ADMIN — Medication 16 UNITS/KG/HR: at 22:08

## 2022-01-03 RX ADMIN — AMLODIPINE BESYLATE 10 MG: 5 TABLET ORAL at 18:24

## 2022-01-03 RX ADMIN — ASPIRIN 81 MG: 81 TABLET, COATED ORAL at 08:13

## 2022-01-03 RX ADMIN — Medication 12.02 UNITS/KG/HR: at 06:16

## 2022-01-03 RX ADMIN — LEVOTHYROXINE SODIUM 75 MCG: 0.07 TABLET ORAL at 06:13

## 2022-01-03 RX ADMIN — Medication 14.02 UNITS/KG/HR: at 21:03

## 2022-01-03 RX ADMIN — HEPARIN SODIUM 4000 UNITS: 1000 INJECTION INTRAVENOUS; SUBCUTANEOUS at 14:19

## 2022-01-03 RX ADMIN — DEXAMETHASONE SODIUM PHOSPHATE 6 MG: 10 INJECTION, SOLUTION INTRAMUSCULAR; INTRAVENOUS at 08:13

## 2022-01-03 ASSESSMENT — ENCOUNTER SYMPTOMS
COUGH: 1
EYE DISCHARGE: 0
TROUBLE SWALLOWING: 0
DIARRHEA: 0
BACK PAIN: 0
SHORTNESS OF BREATH: 1
NAUSEA: 0
WHEEZING: 0
CONSTIPATION: 0
SORE THROAT: 0
RHINORRHEA: 0
EYE REDNESS: 0
ABDOMINAL PAIN: 0

## 2022-01-03 ASSESSMENT — PAIN SCALES - GENERAL
PAINLEVEL_OUTOF10: 0

## 2022-01-03 NOTE — PROGRESS NOTES
Lab unable to collect PTT. MD paged concerning lab not being drawn, charge RN also aware. Also asked if PICC is possible to be placed since pt needs 2 IV's to receive heparin gtt and remdesivir.

## 2022-01-03 NOTE — CARE COORDINATION
Chart reviewed for d/c planning. Pt is alert and oriented. Independent, from home. Currently on 4 liters of oxygen, heparin gtt. Remdesivir with end date of 1/8/22. Covid pending. No therapy at this time. Cm will monitor for d/c needs.       Michael Garsia RN, BSN  [phone]

## 2022-01-03 NOTE — H&P
HOSPITALISTS HISTORY AND PHYSICAL  01/02/22  Patient Information:  Tika Tenorio is a 63 y.o. male [id number]  PCP:  Maikol Hensley MD (Tel: [phone] )    Chief complaint:    Chief Complaint   Patient presents with    Shortness of Breath     pt c/o COVId symptoms since before [place]. states he was doing well until this morning when he got up and felt like he couldn't breath. History of Present Illness:  Billie Christie is a 63 y.o. male with h/o HTN , dyslipidemia presented with c/o dyspnea and congestion . Was tested positive for COVID  At a an outside facility late last month. He is found to be hypoxic with ambulation with sats dropping in high 80's. Does not wear O2 at baseline. Chest CT showed b/l Pulmonary embolism with cardiac strain . The pt remains hemodynamically stable. He has been started on Heparin     REVIEW OF SYSTEMS:   Constitutional: Negative for fever,chills or night sweats  ENT: Negative for rhinorrhea, epistaxis, hoarseness, sore throat. Respiratory: +ve for shortness of breath,wheezing  Cardiovascular: Negative for chest pain, palpitations   Gastrointestinal: Negative for nausea, vomiting, diarrhea  Genitourinary: Negative for polyuria, dysuria   Hematologic/Lymphatic: Negative for bleeding tendency, easy bruising  Musculoskeletal: Negative for myalgias and arthralgias  Neurologic: Negative for confusion,dysarthria. Skin: Negative for itching,rash  Psychiatric: Negative for depression,anxiety, agitation. Endocrine: Negative for polydipsia,polyuria,heat /cold intolerance. Past Medical History:   has a past medical history of Bone marrow donor, Fatty liver, HTN (hypertension), HTN (hypertension), Hyperlipidemia, Hyperlipidemia, Hypertension, Lumbar herniated disc, Obesity, Pre-diabetes, and Subclinical hypothyroidism.      Past Surgical History:   has a past surgical history that includes knee surgery; Colonoscopy (01/16/2012); other surgical history; Colonoscopy (10/14/2014); and Total hip arthroplasty (Right, 2017). Medications:  No current facility-administered medications on file prior to encounter.      Current Outpatient Medications on File Prior to Encounter   Medication Sig Dispense Refill    amLODIPine (NORVASC) 10 MG tablet Take 10 mg by mouth daily      levothyroxine (SYNTHROID) 75 MCG tablet 1 po qam 90 tablet 3    losartan-hydroCHLOROthiazide (HYZAAR) 100-25 MG per tablet 1 po qam 90 tablet 3    celecoxib (CELEBREX) 200 MG capsule Take 1 capsule by mouth daily 60 capsule 0    aspirin 81 MG tablet Take 81 mg by mouth daily      ondansetron (ZOFRAN ODT) 4 MG disintegrating tablet Take 1 tablet by mouth every 8 hours as needed for Nausea or Vomiting 12 tablet 2    etodolac (LODINE) 200 MG capsule Take 1 capsule by mouth every 8 hours as needed (pain) Take with food 270 capsule 0     Current Facility-Administered Medications   Medication Dose Route Frequency Provider Last Rate Last Admin    heparin (porcine) injection 8,000 Units  8,000 Units IntraVENous PRN Steve Beelisam PA-C        heparin (porcine) injection 4,000 Units  4,000 Units IntraVENous PRN Steve Beecham, PA-C        heparin 25,000 units in dextrose 5% 250 mL (premix) infusion  5-30 Units/kg/hr IntraVENous Continuous Nena Richardson MD 25.9 mL/hr at 01/02/22 1802 15 Units/kg/hr at 01/02/22 1802    dexamethasone (PF) (DECADRON) injection 6 mg  6 mg IntraVENous Daily Nena Richardson MD        amLODIPine (NORVASC) tablet 10 mg  10 mg Oral Daily Nena Richardson MD        aspirin EC tablet 81 mg  81 mg Oral Daily Nena Richardson MD        levothyroxine (SYNTHROID) tablet 75 mcg  75 mcg Oral Daily Nena Richardson MD        sodium chloride flush 0.9 % injection 5-40 mL  5-40 mL IntraVENous 2 times per day Nena Richardson MD        sodium chloride flush 0.9 % injection 5-40 mL 5-40 mL IntraVENous PRN Jade Buckley MD        0.9 % sodium chloride infusion  25 mL IntraVENous PRN Jade Bukcley MD        ondansetron (ZOFRAN-ODT) disintegrating tablet 4 mg  4 mg Oral Q8H PRN Jade Buckley MD        Or    ondansetron (ZOFRAN) injection 4 mg  4 mg IntraVENous Q6H PRN Jade Buckley MD        polyethylene glycol (GLYCOLAX) packet 17 g  17 g Oral Daily PRN Jade Buckley MD        acetaminophen (TYLENOL) tablet 650 mg  650 mg Oral Q6H PRN Jade Buckley MD        Or   [facility] acetaminophen (TYLENOL) suppository 650 mg  650 mg Rectal Q6H PRN Jade Buckley MD        morphine (PF) injection 2 mg  2 mg IntraVENous Q4H PRN Jade Buckley MD        melatonin tablet 3 mg  3 mg Oral Nightly PRN ANA Quintanilla-C   3 mg at 01/02/22 2232     Allergies:  No Known Allergies     Social History:  Patient Lives    reports that he has never smoked. He has never used smokeless tobacco. He reports current alcohol use of about 4.0 - 5.0 standard drinks of alcohol per week. He reports that he does not use drugs. Family History:  family history includes Cancer in his father, sister, sister, and sister; High Blood Pressure in his father and mother. ,     Physical Exam:  BP (!) 148/88   Pulse 96   Temp 98.2 °F (36.8 °C) (Oral)   Resp 18   Ht 6' (1.829 m)   Wt (!) 380 lb (172.4 kg)   SpO2 93%   BMI 51.54 kg/m²     General appearance:  Appears comfortable. Well nourished  Eyes: Sclera clear, pupils equal  ENT: Moist mucus membranes, no thrush. Trachea midline. Cardiovascular: Regular rhythm, normal S1, S2. No murmur, gallop, rub. No edema in lower extremities  Respiratory: Clear to auscultation bilaterally, no wheeze, good inspiratory effort  Gastrointestinal: Abdomen soft, non-tender, not distended, normal bowel sounds  Musculoskeletal: No cyanosis in digits, neck supple  Neurology: Cranial nerves grossly intact. Alert and oriented in time, place and person.  No speech or motor deficits  Psychiatry: Appropriate affect. Not agitated  Skin: Warm, dry, normal turgor, no rash  Brisk capillary refill, peripheral pulses palpable   Labs:  CBC:   Lab Results   Component Value Date    WBC 7.6 01/02/2022    RBC 4.46 01/02/2022    HGB 13.6 01/02/2022    HCT 39.2 01/02/2022    MCV 88.0 01/02/2022    MCH 30.4 01/02/2022    MCHC 34.6 01/02/2022    RDW 12.6 01/02/2022     01/02/2022    MPV 6.7 01/02/2022     BMP:    Lab Results   Component Value Date     01/02/2022    K 4.0 01/02/2022    CL 98 01/02/2022    CO2 26 01/02/2022    BUN 20 01/02/2022    CREATININE 1.0 01/02/2022    CALCIUM 9.5 01/02/2022    GFRAA >60 01/02/2022    GFRAA >60 07/22/2011    LABGLOM >60 01/02/2022    LABGLOM 40 05/16/2011    GLUCOSE 155 01/02/2022    GLUCOSE 103 05/16/2011     CT CHEST PULMONARY EMBOLISM W CONTRAST   Final Result   Bilateral pulmonary emboli      Right ventricle dilated compared to the left suggesting right ventricular   heart strain      Findings were discussed with Mikayla ROE at 5:16 pm on 1/2/2022. RECOMMENDATIONS:   Unavailable         XR CHEST PORTABLE   Final Result   Bilateral airspace opacities suggesting COVID pneumonia         VL Extremity Venous Bilateral    (Results Pending)     Chest Xray:   EKG:        Problem List  Active Problems:    Bilateral pulmonary embolism (HCC)  Resolved Problems:    * No resolved hospital problems. *        Assessment/Plan:   Bilateral PE with corpulmonale  Cont On Heparin gtt   ECHO and LE doppler ordered  Vascular surgery has been consulted  Pt remains hemodynamically stable        Acute respiratory failure with hypoxia upon ambulation   Per nursing note the pt O2 dropped to high 80's with ambulation   Provide supplemental O2 as needed   Cont Decadron   Will start on Remdesivir    Obese BMI 51      Code status Full   Diet reg      Admit as inpatient.  I anticipate hospitalization spanning more than two midnights for investigation and treatment of the above medically necessary diagnoses. Please note that some part of this chart was generated using Dragon dictation software. Although every effort was made to ensure the accuracy of this automated transcription, some errors in transcription may have occurred inadvertently. If you may need any clarification, please do not hesitate to contact me through [facility].        Sara Cabrera MD    01/02/22

## 2022-01-03 NOTE — FLOWSHEET NOTE
PICC line education:    -Risks  -Benefits  -Alternatives  -Procedure    Discussed the above with patient, verbalized understanding, answered all questions. Provided with information on PICC care to review. PICC tip verified via Chest X-ray (Do not use - waiting for official CXR reading). Reported off to patient's  Nurse Sapphire Hodges RN.

## 2022-01-03 NOTE — CONSULTS
Clinical Pharmacy Note    Pharmacy consulted by Dr. Caty Avina to start remdesivir. Patient is COVID positive and currently on 3.5L O2. Orders have been placed.     Nnamdi Rucker, PharmD, [facility]  Clinical Pharmacist  [id number]

## 2022-01-03 NOTE — CONSULTS
[facility]  Consultation Note    Chief Complaint / Reason for Consultation  PE    History of Present Illness  Patient is a 63 y.o. male with past medical history of HTN, HLD and obesity who presented to the ED with complaints of worsening shortness of breath. Patient reports about two Fridays ago he started with congestion and mucous and a cough. He thought he was improving. He took a home rapid test earlier last week and was positive with a faint line. He reports yesterday he was having significant shortness of breath with limited activity just ambulating to the bathroom. He denies any leg swelling or pain/cramping in the calf. He reports some underlying neuropathy. He denies any personal history of blood clots or clotting disorders. He denies recent travel or surgery. He denies tobacco use. His sister had leukemia and  from a PE. In ED, CTPE chest showed bilateral PE with RV strain. Patient hemodynamically stable on 3.5 lpm oxygen nasal cannula. He was started on Heparin drip. Patient reports improvement of SOB at rest but still with dyspnea on exertion. We have been consulted for further evaluation and recommendations. Review of Systems  + SOB. Denies fevers, chills, chest pain, nausea, vomiting, hematemesis, diarrhea, constipation, melena, hematochezia, wt changes, vision problems, blindness, hearing problems, facial droop, slurred speech, extremity weakness, extremity numbness, dysuria.     Past Medical History:   Diagnosis Date    Bone marrow donor     x 2    Fatty liver 2021    HTN (hypertension) 2012    HTN (hypertension) 2012    Hyperlipidemia     Hyperlipidemia     Hypertension     Lumbar herniated disc     x 3    Obesity     Pre-diabetes 2014    Subclinical hypothyroidism 2014       Past Surgical History:   Procedure Laterality Date    COLONOSCOPY  2012    COLONOSCOPY  10/14/2014    KNEE SURGERY      OTHER SURGICAL HISTORY      epidural injection x 2    TOTAL HIP ARTHROPLASTY Right 2017    [facility]       No Known Allergies    Social History     Socioeconomic History    Marital status:      Spouse name: Not on file    Number of children: Not on file    Years of education: Not on file    Highest education level: Not on file   Occupational History    Not on file   Tobacco Use    Smoking status: Never Smoker    Smokeless tobacco: Never Used   Substance and Sexual Activity    Alcohol use: Yes     Alcohol/week: 4.0 - 5.0 standard drinks     Types: 4 - 5 Cans of beer per week     Comment: occasional    Drug use: No    Sexual activity: Yes     Partners: Female   Other Topics Concern    Not on file   Social History Narrative    Not on file     Social Determinants of Health     Financial Resource Strain: Low Risk     Difficulty of Paying Living Expenses: Not hard at all   Food Insecurity: No Food Insecurity    Worried About [address] [employer] in the Last Year: Never true    [facility] in the Last Year: Never true   Transportation Needs:     Lack of Transportation (Medical): Not on file    Lack of Transportation (Non-Medical):  Not on file   Physical Activity:     Days of Exercise per Week: Not on file    Minutes of Exercise per Session: Not on file   Stress:     Feeling of Stress : Not on file   Social Connections:     Frequency of Communication with Friends and Family: Not on file    Frequency of Social Gatherings with Friends and Family: Not on file    Attends Evangelical Services: Not on file    Active Member of Clubs or Organizations: Not on file    Attends Club or Organization Meetings: Not on file    Marital Status: Not on file   Intimate Partner Violence:     Fear of Current or Ex-Partner: Not on file    Emotionally Abused: Not on file    Physically Abused: Not on file    Sexually Abused: Not on file   Housing Stability:     Unable to Pay for Housing in the Last Year: Not on file    Number of Places Lived in the Last Year: Not on file    Unstable Housing in the Last Year: Not on file       Family History   Problem Relation Age of Onset    High Blood Pressure Mother     High Blood Pressure Father     Cancer Father         prostate, melanoma    Cancer Sister         [place] Sister         melanoma    Cancer Sister         leukemia     - No history of bleeding or clotting disorders    Vital Signs  Vitals:    01/02/22 2306 01/03/22 0051 01/03/22 0547 01/03/22 0749   BP:  (!) 128/96 (!) 145/95 108/66   Pulse: 88 80 78 81   Resp:  22 18 18   Temp:  98.1 °F (36.7 °C) 98.3 °F (36.8 °C) 97.1 °F (36.2 °C)   TempSrc:  Oral Axillary Axillary   SpO2:  94% 96% 96%   Weight:       Height:           Physical Examination  General: no apparent distress, appears stated age  Psychiatric: affect appropriate  Head/Eyes/Ears/Nose/Throat:  Normocephalic, atraumatic, PERRLA, face symmetric  Neck:  no adenopathy, no carotid bruit, no JVD, supple, symmetrical, trachea midline, thyroid not enlarged, no tenderness/mass/nodules  Chest/Lungs: clear to auscultation bilaterally, no accessory muscle use  Cardiac:  regular rate and rhythm, S1, S2 normal, no murmur, click, rub or gallop  Abdomen: soft, nontender, active bowel sounds  Extremities: warm and well perfused, no signs of cyanosis or ischemia, trace BLE edema, bilateral upper and lower extremity motorsensory intact  Vascular exam:  - R radial: 2+  - L radial: 2+  - R femoral: 2+  - L femoral: 2+  - R DP: 1+  - L DP: 1+    Labs  Lab Results   Component Value Date    WBC 7.6 01/02/2022    HGB 13.6 01/02/2022    HCT 39.2 01/02/2022    MCV 88.0 01/02/2022     01/02/2022     Lab Results   Component Value Date     01/02/2022    K 4.0 01/02/2022    CL 98 01/02/2022    CO2 26 01/02/2022    PHOS 2.8 09/05/2014    BUN 20 01/02/2022    CREATININE 1.0 01/02/2022      No results found for: GLU    Scheduled Meds:    remdesivir IVPB  200 mg IntraVENous Once    Followed by   Abhishek Harvey ON 1/4/2022] remdesivir IVPB  100 mg IntraVENous Q24H    albuterol sulfate HFA  2 puff Inhalation 4x daily    ipratropium  2 puff Inhalation 4x daily    budesonide-formoterol  2 puff Inhalation BID    dexamethasone  6 mg IntraVENous Daily    amLODIPine  10 mg Oral Daily    aspirin  81 mg Oral Daily    levothyroxine  75 mcg Oral Daily    sodium chloride flush  5-40 mL IntraVENous 2 times per day     Continuous Infusions:    heparin (PORCINE) Infusion 12.02 Units/kg/hr (01/03/22 0616)    sodium chloride         Imaging:     CTPE chest 1/2/22:  Impression   Bilateral pulmonary emboli       Right ventricle dilated compared to the left suggesting right ventricular   heart strain       Assessment:   Acute bilateral PE with RV strain on CT scan - likely 2/2 recent COVID-19 infection, remains hemodynamically stable   Hypoxia, SOB - 2/2 above, on 3.5 lpm nasal cannula  COVID-19 positive - at home rapid test  HTN  HLD    Plan: Will get ECHO to further evaluate degree of RV strain and if shows significant strain would likely recommend moving forward with catheter directed thrombolysis. Continue Heparin drip. BLE venous duplex ordered. COVID-19 PCR test ordered. Will follow up after ECHO with further plan. Plan discussed with Dr. Jesus Lopez. Thank you for the consultation. Patient educated on plan of care and disease process. All questions answered. Electronically signed by MERCY Bergman CNP on 1/3/2022 at 8:35 AM     VASCULAR STAFF  Seen and discussed with GENEVA Pink CNP. Agree with above history, exam, assessment and plan. Independent exam and assessment performed. Findings as above  ECHO with RV strain and hypokinesis  IMP: Acute PE with RV strain and exertional hypoxia   + COVID pneumonia  PLAN: EKOS catheter directed thrombolysis in AM   Procedure and risks explained to pt. He understands and agrees to proceed.    Will take standard COVID precautions.      Ana Wood

## 2022-01-03 NOTE — RT PROTOCOL NOTE
RT Inhaler-Nebulizer Bronchodilator Protocol Note    There is a bronchodilator order in the chart from a provider indicating to follow the RT Bronchodilator Protocol and there is an Initiate RT Inhaler-Nebulizer Bronchodilator Protocol order as well (see protocol at bottom of note). CXR Findings:  XR CHEST PORTABLE    Result Date: 1/2/2022  Bilateral airspace opacities suggesting COVID pneumonia       The findings from the last RT Protocol Assessment were as follows:   History Pulmonary Disease: None or smoker <15 pack years  Respiratory Pattern: Dyspnea on exertion or RR 21-25 bpm  Breath Sounds: Slightly diminished and/or crackles  Cough: Strong, spontaneous, non-productive  Indication for Bronchodilator Therapy:    Bronchodilator Assessment Score: 4    Aerosolized bronchodilator medication orders have been revised according to the RT Inhaler-Nebulizer Bronchodilator Protocol below. Respiratory Therapist to perform RT Therapy Protocol Assessment initially then follow the protocol. Repeat RT Therapy Protocol Assessment PRN for score 0-3 or on second treatment, BID, and PRN for scores above 3. No Indications  adjust the frequency to every 6 hours PRN wheezing or bronchospasm, if no treatments needed after 48 hours then discontinue using Per Protocol order mode. If indication present, adjust the RT bronchodilator orders based on the Bronchodilator Assessment Score as indicated below. Use Inhaler orders unless patient has one or more of the following: on home nebulizer, not able to hold breath for 10 seconds, is not alert and oriented, cannot activate and use MDI correctly, or respiratory rate 25 breaths per minute or more, then use the equivalent nebulizer order(s) with same Frequency and PRN reasons based on the score. If a patient is on this medication at home then do not decrease Frequency below that used at home.     0-3  enter or revise RT bronchodilator order(s) to equivalent RT Bronchodilator order with Frequency of every 4 hours PRN for wheezing or increased work of breathing using Per Protocol order mode. 4-6  enter or revise RT Bronchodilator order(s) to two equivalent RT bronchodilator orders with one order with BID Frequency and one order with Frequency of every 4 hours PRN wheezing or increased work of breathing using Per Protocol order mode. 7-10  enter or revise RT Bronchodilator order(s) to two equivalent RT bronchodilator orders with one order with TID Frequency and one order with Frequency of every 4 hours PRN wheezing or increased work of breathing using Per Protocol order mode. 11-13  enter or revise RT Bronchodilator order(s) to one equivalent RT bronchodilator order with QID Frequency and an Albuterol order with Frequency of every 4 hours PRN wheezing or increased work of breathing using Per Protocol order mode. Greater than 13  enter or revise RT Bronchodilator order(s) to one equivalent RT bronchodilator order with every 4 hours Frequency and an Albuterol order with Frequency of every 2 hours PRN wheezing or increased work of breathing using Per Protocol order mode. RT to enter RT Home Evaluation for COPD & MDI Assessment order using Per Protocol order mode.     Electronically signed by Rufina Clements RCP on 1/3/2022 at 4:59 PM

## 2022-01-03 NOTE — CONSULTS
Infectious Diseases   Consult Note        Admission Date: 1/2/2022  Hospital Day: Hospital Day: 2   Attending: Laqueta Gosselin, MD  Date of service: 1/3/22     Reason for admission: Bilateral pulmonary embolism ([address] [I26.99]  Acute respiratory failure with hypoxia ([address] [J96.01]  Acute pulmonary embolism with acute cor pulmonale, unspecified pulmonary embolism type ([address] [I26.09]  Pneumonia due to COVID-19 virus [U07.1, J12.82]    Chief complaint/ Reason for consult: Acute respiratory failure with hypoxia, COVID-19 pneumonia    Microbiology:        I have reviewed allavailable micro lab data and cultures    · Blood culture (2/2) - collected on 1/2/2022: In process      Antibiotics and immunizations:       Current antibiotics: All antibiotics and their doses were reviewed by me    Recent Abx Admin      No antibiotic orders with administrations found. Immunization History: All immunization history was reviewed by me today. Immunization History   Administered Date(s) Administered    Influenza, Quadv, Recombinant, IM PF (Flublok 18 yrs and older) 11/21/2018    Tdap (Boostrix, Adacel) 05/14/2014       Known drug allergies: All allergies were reviewed and updated    No Known Allergies    Social history:     Social History:  All social andepidemiologic history was reviewed and updated by me today as needed. · Tobacco use:   reports that he has never smoked. He has never used smokeless tobacco.  · Alcohol use:   reports current alcohol use of about 4.0 - 5.0 standard drinks of alcohol per week. · Currently lives in: [address] [place] [address]  ·  reports no history of drug use.      COVID VACCINATION AND LAB RESULT RECORDS:     Internal Administration   First Dose      Second Dose           Last COVID Lab No results found for: SARS-COV-2, SARS-COV-2 RNA, SARS-COV-2, SARS-COV-2, SARS-COV-2 BY PCR, SARS-COV-2, SARS-COV-2, SARS-COV-2         Assessment:     The patient is a 63 y.o. old male who  has a past medical history of Bone marrow donor, Fatty liver (11/16/2021), HTN (hypertension) (5/23/2012), HTN (hypertension) (5/23/2012), Hyperlipidemia, Hyperlipidemia, Hypertension, Lumbar herniated disc, Obesity, Pre-diabetes (6/4/2014), and Subclinical hypothyroidism (6/4/2014). with following problems:    · COVID-19 pneumonia  · Acute respiratory failure with hypoxia  · Lactic acidosis with elevated lactate of 2.5 on admission  · Bilateral pulmonary embolism in setting of COVID-19 with right ventricular strain  · Elevated D-dimer of 4257  · Elevated ALT 43 in setting of COVID-19  · Essential hypertension  · Hyperlipidemia  · Prediabetes  · Subclinical hypothyroidism  · Essential hypertension  · Obesity Class 3 due to excess calorie intake : Body mass index is 51.54 kg/m². ·       Discussion:      The patient was admitted for shortness of breath after testing positive for COVID-19 rapid test kit at home. He had a CT angiogram of the chest done in the ER. CT images reviewed it shows bilateral lung infiltrates, possible COVID 19. The CT angiogram also showed bilateral pulmonary emboli with evidence of right ventricular strain    He is on 4 L oxygen via nasal cannula. His symptoms have been going on since Christmas. His shortness of breath is likely secondary to both COVID 19 as well as bilateral pulmonary embolism    Procalcitonin level is low at 0.06. CRP is elevated at 69.9    COVID 19 vaccination status unknown    Plan:     Diagnostic Workup:      · Continue to follow  fever curve, WBC count and blood cultures. · Follow up on blood counts, liver and renal function. Antimicrobials:    · Since the patient is symptomatic since around Christmas, he seems to be toward the end of window of starting remdesivir. Benefit if any, may be quite limited.   · Vascular surgery note reviewed plans for 2D echo and possible catheter directed thrombolysis noted  · We will start patient on IV Decadron 10 mg daily for now  · Encourage frequent change in postures and awake prone positioning as tolerated. · If respiratory status worsens, also consider Actemra  · Cough and deep breathing exercises. · Agree with pulmonology consultation  · Anticoagulation per primary and pulmonary. · Continue to monitor vitals closely. · Continue supplemental oxygen/high flow/BiPAP as needed to maintain oxygen saturation above 92%. · Maintain good glycemic control. · Continue to watch for new or worsening fever or productive cough or other signs of secondary bacterial infection. · Fall and aspiration precautions. · COVID-19 isolation precautions. · Continue close monitoring in the COVID-19 unit. Remains at high risk of complications. · Discussed all above with RN   · Further management for COVID-19 and pulmonary embolism per primary and pulmonology. ID will see him on a as needed basis from now onwards        Drug Monitoring:    · Continue monitoring for antimicrobial agent toxicity as follows: CBC, CMP  · Continue to watch for following: new or worsening fever, new hypotension, hives, lip swelling and redness or purulence at vascular access sites. I/v access Management:    · Continue to monitor i.v access sites for erythema, induration, discharge or tenderness. · As always, continue efforts to minimize tubes/lines/drains as clinically appropriate to reduce chances of line associated infections. Level of complexity of visit: High     Risk of Complications/Morbidity: High     · Illness(es)/ Infection present that pose threat to life/bodily function. · There is potential for severe exacerbation of infection/side effects of treatment. · Therapy requires intensive monitoring for antimicrobial agent toxicity. Thank you for involving me in the care of your patient. I will continue to follow. If you have anyadditional questions, please do not hesitate to contact me.      Subjective:     Presenting complaint in ER:     Chief Complaint   Patient presents with    Shortness of Breath     pt c/o COVId symptoms since before [place]. states he was doing well until this morning when he got up and felt like he couldn't breath. HPI: Severiano Escort is a 63 y.o. male patient, who was seen at the request of Dr. Naz Cardoso MD.    History was obtained from chart review and the patient. The patient was admitted on 1/2/2022. I have been consulted to see the patient for above mentioned reason(s). The patient has multiple medical comorbidities, and presented to the ER for cough and congestion that was going on since [place] time. The patient took a rapid COVID-19 test at home and tested positive. He tested positive for COVID-19. He came to the ER and was admitted. I have been asked for my opinion for management for this patient. Past Medical History: All past medical history reviewed today. Past Medical History:   Diagnosis Date    Bone marrow donor     x 2    Fatty liver 11/16/2021    HTN (hypertension) 5/23/2012    HTN (hypertension) 5/23/2012    Hyperlipidemia     Hyperlipidemia     Hypertension     Lumbar herniated disc     x 3    Obesity     Pre-diabetes 6/4/2014    Subclinical hypothyroidism 6/4/2014         Past Surgical History: All pastsurgical history was reviewed today. Past Surgical History:   Procedure Laterality Date    COLONOSCOPY  01/16/2012    COLONOSCOPY  10/14/2014    KNEE SURGERY      OTHER SURGICAL HISTORY      epidural injection x 2    TOTAL HIP ARTHROPLASTY Right 2017    Enoc Nagel, Dr Renetta Luna         Family History: All family history was reviewed today. Problem Relation Age of Onset    High Blood Pressure Mother     High Blood Pressure Father     Cancer Father         prostate, melanoma    Cancer Sister         Kendrick Musty Sister         melanoma    Cancer Sister         leukemia         Medications:  All current and past medications were reviewed. Medications Prior to Admission: amLODIPine (NORVASC) 10 MG tablet, Take 10 mg by mouth daily  levothyroxine (SYNTHROID) 75 MCG tablet, 1 po qam  losartan-hydroCHLOROthiazide (HYZAAR) 100-25 MG per tablet, 1 po qam  celecoxib (CELEBREX) 200 MG capsule, Take 1 capsule by mouth daily  aspirin 81 MG tablet, Take 81 mg by mouth daily  ondansetron (ZOFRAN ODT) 4 MG disintegrating tablet, Take 1 tablet by mouth every 8 hours as needed for Nausea or Vomiting  etodolac (LODINE) 200 MG capsule, Take 1 capsule by mouth every 8 hours as needed (pain) Take with food     remdesivir IVPB  200 mg IntraVENous Once    Followed by   Dina Reynaga ON 1/4/2022] remdesivir IVPB  100 mg IntraVENous Q24H    albuterol sulfate HFA  2 puff Inhalation 4x daily    ipratropium  2 puff Inhalation 4x daily    budesonide-formoterol  2 puff Inhalation BID    benzonatate  200 mg Oral TID    dexamethasone  6 mg IntraVENous Daily    amLODIPine  10 mg Oral Daily    aspirin  81 mg Oral Daily    levothyroxine  75 mcg Oral Daily    sodium chloride flush  5-40 mL IntraVENous 2 times per day          REVIEW OF SYSTEMS:       Review of Systems   Constitutional: Positive for fatigue and fever. Negative for chills and diaphoresis. HENT: Negative for ear discharge, ear pain, rhinorrhea, sore throat and trouble swallowing. Eyes: Negative for discharge and redness. Respiratory: Positive for cough and shortness of breath. Negative for wheezing. Cardiovascular: Negative for chest pain and leg swelling. Gastrointestinal: Negative for abdominal pain, constipation, diarrhea and nausea. Endocrine: Negative for polyuria. Genitourinary: Negative for dysuria, flank pain, frequency, hematuria and urgency. Musculoskeletal: Negative for back pain and myalgias. Skin: Negative for rash. Neurological: Negative for dizziness, seizures and headaches. Hematological: Does not bruise/bleed easily.    Psychiatric/Behavioral: Negative for hallucinations and suicidal ideas. All other systems reviewed and are negative. Objective:       PHYSICAL EXAM:      Vitals:   Vitals:    01/03/22 0547 01/03/22 0749 01/03/22 1215 01/03/22 1315   BP: (!) 145/95 108/66 (!) 152/85    Pulse: 78 81 86    Resp: 18 18 18 18   Temp: 98.3 °F (36.8 °C) 97.1 °F (36.2 °C) 97.1 °F (36.2 °C)    TempSrc: Axillary Axillary Temporal    SpO2: 96% 96% 95% 95%   Weight:       Height:           Physical Exam  Vitals and nursing note reviewed. Constitutional:       Appearance: Normal appearance. He is well-developed. Comments: Morbidly obese   HENT:      Head: Normocephalic and atraumatic. Right Ear: External ear normal.      Left Ear: External ear normal.      Nose: Nose normal. No congestion or rhinorrhea. Mouth/Throat:      Mouth: Mucous membranes are moist.      Pharynx: No oropharyngeal exudate or posterior oropharyngeal erythema. Eyes:      General: No scleral icterus. Right eye: No discharge. Left eye: No discharge. Conjunctiva/sclera: Conjunctivae normal.      Pupils: Pupils are equal, round, and reactive to light. Cardiovascular:      Rate and Rhythm: Normal rate and regular rhythm. Pulses: Normal pulses. Heart sounds: No murmur heard. No friction rub. Pulmonary:      Effort: Pulmonary effort is normal. No respiratory distress. Breath sounds: No stridor. Rales (Bilateral patchy crackles) present. No wheezing or rhonchi. Abdominal:      General: Bowel sounds are normal.      Palpations: Abdomen is soft. Tenderness: There is no abdominal tenderness. There is no right CVA tenderness, left CVA tenderness, guarding or rebound. Musculoskeletal:         General: No swelling or tenderness. Normal range of motion. Cervical back: Normal range of motion and neck supple. No rigidity. No muscular tenderness. Lymphadenopathy:      Cervical: No cervical adenopathy.    Skin:     General: Skin is warm and dry. Coloration: Skin is not jaundiced. Findings: No erythema or rash. Neurological:      General: No focal deficit present. Mental Status: He is alert and oriented to person, place, and time. Mental status is at baseline. Motor: No abnormal muscle tone. Psychiatric:         Mood and Affect: Mood normal.         Behavior: Behavior normal.         Thought Content: Thought content normal.           Lines and drains: All vascular access sites are healthy with no local erythema, discharge or tenderness. Intake and output:     I/O last 3 completed shifts:  In: -   Out: 400 [Urine:400]    Lab Data:   All available labs were reviewed by me today. CBC:   Recent Labs     01/02/22  1516   WBC 7.6   RBC 4.46   HGB 13.6   HCT 39.2*      MCV 88.0   MCH 30.4   MCHC 34.6   RDW 12.6        BMP:  Recent Labs     01/02/22  1516 01/03/22  0841   * 137   K 4.0 4.7   CL 98* 99   CO2 26 24   BUN 20 18   CREATININE 1.0 1.0   CALCIUM 9.5 9.3   GLUCOSE 155* 152*        Hepatic FunctionPanel:   Lab Results   Component Value Date    ALKPHOS 52 01/02/2022    ALT 43 01/02/2022    AST 30 01/02/2022    PROT 7.0 01/02/2022    PROT 7.0 05/16/2011    BILITOT 0.7 01/02/2022    LABALBU 3.2 01/02/2022       CPK: No results found for: CKTOTAL  ESR: No results found for: SEDRATE  CRP:   Lab Results   Component Value Date    CRP 51.6 (H) 01/03/2022         Imaging: All pertinent images and reports for the current visit were reviewed by meduring this visit. CT CHEST PULMONARY EMBOLISM W CONTRAST   Final Result   Bilateral pulmonary emboli      Right ventricle dilated compared to the left suggesting right ventricular   heart strain      Findings were discussed with Oscar ROE at 5:16 pm on 1/2/2022.       RECOMMENDATIONS:   Unavailable         XR CHEST PORTABLE   Final Result   Bilateral airspace opacities suggesting COVID pneumonia         VL Extremity Venous Bilateral    (Results Pending)       Outside records:    Labs, Microbiology, Radiology and pertinent results from Care everywhere, if available, were reviewed as a part ofthe consultation. Problem list:       Patient Active Problem List   Diagnosis Code    Hyperlipidemia E78.5    Morbid obesity due to excess calories (HCC) E66.01    Essential hypertension I10    Prediabetes R73.03    Subclinical hypothyroidism E03.8    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    Herniated lumbar intervertebral disc M51.26    Primary osteoarthritis of left hip M16.12    Fatty liver K76.0    Acute pulmonary embolism with acute cor pulmonale (HCC) I26.09    Acute respiratory failure with hypoxia (HCC) J96.01    Pneumonia due to COVID-19 virus U07.1, J12.82    Lactic acidosis E87.2    Elevated ALT measurement R74.01    Elevated d-dimer R79.89         Please note that this chart was generated using Dragon dictation software. Although every effort was made to ensure the accuracy of this automated transcription, some errors in transcription may have occurred inadvertently. If you may need any clarification, please do not hesitate to contact me through EPIC or at the phone number provided below with my electronic signature. Any pictures or media included in this note were obtained after taking informed verbal consent from the patient and with their approval to include those in the patient's medical record.         Swati Chadwick MD, MPH, [address] [place] [address]  1/3/2022, 1:36 PM  [facility] Infectious Disease   [address] [place] [address] [address] [facility], [address] [place] [address]  Office: [phone]  Fax: [phone]  Clinic days:  Tuesday & Thursday

## 2022-01-03 NOTE — PROGRESS NOTES
Hospitalist Progress Note      PCP: Mer Munoz MD    Date of Admission: 1/2/2022    Chief Complaint: Shortness of breath    Hospital Course: Ellen Jefferson is a 63 y.o. male with h/o HTN , dyslipidemia presented with c/o dyspnea and congestion . Was tested positive for COVID  At a an outside facility late last month. He is found to be hypoxic with ambulation with sats dropping in high 80's. Does not wear O2 at baseline. Chest CT showed b/l Pulmonary embolism with cardiac strain . The pt remains hemodynamically stable. He has been started on Heparin   Admitted for further work-up and treatment. Subjective: Patient reports his Covid symptoms began 12/17/2021, his daughter-in-law brought home a rapid test on 12/21 and he tested positive. Since then he is continued with worsening shortness of breath. He did not have a Covid test done at an outlying facility. Therefore will check Covid PCR. Consult infectious disease since symptoms started 3 weeks ago, question if remdesivir is indicated in this setting or not. Assessment/Plan:    Bilateral PE with cor pulmonale  -Seen on CT PE imaging  -Vascular consulted, agree with echo to determine degree of right heart strain and if thrombolysis is indicated  -Echo pending lower extremity Doppler pending  -Remains hemodynamically stable  -Pulmonology consult, if no intervention suggested by vascular then can be switched to DOAC upon discharge.,  Will need at least 4 months for provoked PE in setting of COVID-19.     Acute hypoxic respiratory failure  -Secondary to bilateral PEs and Covid  -Patient with a air hunger symptoms of dyspnea that worsens with conversation and minimal exertion  -Currently on 3-1/2 L nasal cannula, continue to attempt to wean off as able    COVID-19 pneumonia  -Remdesivir initiated, day 1 out of 5  -Infectious disease consult given Covid test +3 weeks ago  -Continue Decadron 10 mg IV daily  -Reports cough, schedule Tessalon Perles, add Robitussin with codeine cough syrup  -D-dimer on admit 4257, today 2371  -CRP on admit 69.9, today 51.6    Poor IV access  -Discussed with [facility] nurse and Dr. Clark Bush ICU physician, regarding appropriate access. We will proceed with PICC line placement. If this fails will inform Dr. Clark Bush. Morbid obesity  -Courage lifestyle changes    Has medical history hypertension, hyperlipidemia, hypothyroidism  -Continue home medications as ordered      Active Hospital Problems    Diagnosis     Bilateral pulmonary embolism (HCC) [I26.99]        Medications:  Reviewed    Infusion Medications    heparin (PORCINE) Infusion 12.02 Units/kg/hr (01/03/22 0616)    sodium chloride       Scheduled Medications    remdesivir IVPB  200 mg IntraVENous Once    Followed by   Tara Landeros ON 1/4/2022] remdesivir IVPB  100 mg IntraVENous Q24H    albuterol sulfate HFA  2 puff Inhalation 4x daily    ipratropium  2 puff Inhalation 4x daily    budesonide-formoterol  2 puff Inhalation BID    benzonatate  200 mg Oral TID    dexamethasone  6 mg IntraVENous Daily    amLODIPine  10 mg Oral Daily    aspirin  81 mg Oral Daily    levothyroxine  75 mcg Oral Daily    sodium chloride flush  5-40 mL IntraVENous 2 times per day     PRN Meds: guaiFENesin-codeine, heparin (porcine), heparin (porcine), sodium chloride flush, sodium chloride, ondansetron **OR** ondansetron, polyethylene glycol, acetaminophen **OR** acetaminophen, morphine, melatonin      Intake/Output Summary (Last 24 hours) at 1/3/2022 1252  Last data filed at 1/3/2022 0204  Gross per 24 hour   Intake --   Output 400 ml   Net -400 ml       Physical Exam Performed:    BP (!) 152/85   Pulse 86   Temp 97.1 °F (36.2 °C) (Temporal)   Resp 18   Ht 6' (1.829 m)   Wt (!) 380 lb (172.4 kg)   SpO2 95%   BMI 51.54 kg/m²     General appearance: No apparent distress, appears stated age and cooperative. HEENT: Pupils equal, round, and reactive to light. Conjunctivae/corneas clear. Neck: Supple, with full range of motion. No jugular venous distention. Trachea midline. Respiratory: Dyspnea with minimal conversation, diminished bilaterally   cardiovascular: Regular rate and rhythm with normal S1/S2 without murmurs, rubs or gallops. Abdomen: Soft, non-tender, non-distended with normal bowel sounds. Musculoskeletal: No clubbing, cyanosis. Trace edema bilaterally. Full range of motion without deformity. Skin: Chronic skin discoloration bilateral lower extremity of darkened skin likely secondary to venous stasis  Neurologic:  Neurovascularly intact without any focal sensory/motor deficits. Cranial nerves: II-XII intact, grossly non-focal.  Psychiatric: Alert and oriented, thought content appropriate, normal insight  Capillary Refill: Brisk,< 3 seconds   Peripheral Pulses: +2 palpable, equal bilaterally       Labs:   Recent Labs     01/02/22  1516   WBC 7.6   HGB 13.6   HCT 39.2*        Recent Labs     01/02/22  1516 01/03/22  0841   * 137   K 4.0 4.7   CL 98* 99   CO2 26 24   BUN 20 18   CREATININE 1.0 1.0   CALCIUM 9.5 9.3     Recent Labs     01/02/22  1516   AST 30   ALT 43*   BILITOT 0.7   ALKPHOS 52     Recent Labs     01/02/22  1516   INR 1.21*     Recent Labs     01/02/22  1516   TROPONINI 0.29*       Urinalysis:      Lab Results   Component Value Date    NITRU Negative 09/05/2019    WBCUA 2 09/05/2019    RBCUA 40 09/05/2019    BLOODU neg 02/13/2020    BLOODU LARGE 09/05/2019    SPECGRAV 1.020 02/13/2020    SPECGRAV 1.028 09/05/2019    GLUCOSEU neg 02/13/2020    GLUCOSEU 100 09/05/2019       Radiology:  CT CHEST PULMONARY EMBOLISM W CONTRAST   Final Result   Bilateral pulmonary emboli      Right ventricle dilated compared to the left suggesting right ventricular   heart strain      Findings were discussed with Roseann Sever at 5:16 pm on 1/2/2022.       RECOMMENDATIONS:   Unavailable         XR CHEST PORTABLE   Final Result   Bilateral airspace opacities suggesting COVID pneumonia         VL Extremity Venous Bilateral    (Results Pending)           DVT Prophylaxis: Heparin drip  Diet: ADULT DIET; Regular  Code Status: Full Code    PT/OT Eval Status: No acute needs    Dispo -home once medically stable    MERCY Macdonald - CNP      NOTE:  This report was transcribed using voice recognition software. Every effort was made to ensure accuracy; however, inadvertent computerized transcription errors may be present.

## 2022-01-03 NOTE — PROGRESS NOTES
This RN spoke to [facility] RN, who did not recommend a PICC line due to the pressure caused by cor pulmonale.

## 2022-01-03 NOTE — PROGRESS NOTES
This RN contacted the PICC RN via phone to inquire about placing a PICC line given that the patient has cor pulmonale and left a voicemail.

## 2022-01-03 NOTE — CONSULTS
PULMONARY AND CRITICAL CARE CONSULTATION NOTE    CONSULTING PHYSICIAN:      REASON FOR CONSULT:   Chief Complaint   Patient presents with    Shortness of Breath     pt c/o COVId symptoms since before Christmas. states he was doing well until this morning when he got up and felt like he couldn't breath. DATE OF CONSULT: 1/3/2022    HISTORY OF PRESENT ILLNESS: 63y.o. year old male with past medical history of hypertension, hyperlipidemia who was diagnosed with COVID-19 as outpatient before Christmas presented to hospital with complaints of shortness of breath. Patient was noted to be hypoxic in the ER. CTA chest showed bilateral PE. Patient was initiated on heparin drip. He was noted to have troponin of 0.2 with mild elevation of proBNP of 532. Echocardiogram is pending. CRP was elevated at 69. Patient also noted to have bilateral patchy infiltrates consistent with Covid on CT chest. Currently saturating well on 3 to 4 L nasal cannula. Symptoms have improved while on oxygen. He has been evaluated by vascular surgery who is awaiting echocardiogram results. Patient denies any previous history of blood clots. His sister had PE in the setting of leukemia. REVIEW OF SYSTEMS:   CONSTITUTIONAL SYMPTOMS: The patient denies fever, fatigue, night sweats, weight loss or weight gain. HEENT: No vision changes. No tinnitus, Denies sinus pain. No hoarseness, or dysphagia. NECK: Patient denies swelling in the neck. CARDIOVASCULAR: Denies chest pain, palpitation, syncope. RESPIRATORY: See above  GASTROINTESTINAL: Denies nausea, abdominal pain or change in bowel function. GENITOURINARY: Denies obstructive symptoms. No history of incontinence. BREASTS: No masses or lumps in the breasts. SKIN: No rashes or itching. MUSCULOSKELETAL: Denies weakness or bone pain. NEUROLOGICAL: No headaches or seizures. PSYCHIATRIC: Denies mood swings or depression.    ENDOCRINE: Denies heat or cold intolerance or excessive tablet by mouth every 8 hours as needed for Nausea or Vomiting 12 tablet 2    etodolac (LODINE) 200 MG capsule Take 1 capsule by mouth every 8 hours as needed (pain) Take with food 270 capsule 0        remdesivir IVPB  200 mg IntraVENous Once    Followed by   Kylee García ON 1/4/2022] remdesivir IVPB  100 mg IntraVENous Q24H    albuterol sulfate HFA  2 puff Inhalation 4x daily    ipratropium  2 puff Inhalation 4x daily    budesonide-formoterol  2 puff Inhalation BID    benzonatate  200 mg Oral TID    [START ON 1/4/2022] dexamethasone  10 mg IntraVENous Daily    amLODIPine  10 mg Oral Daily    aspirin  81 mg Oral Daily    levothyroxine  75 mcg Oral Daily    sodium chloride flush  5-40 mL IntraVENous 2 times per day      [START ON 1/4/2022] sodium chloride      heparin (PORCINE) Infusion 12.02 Units/kg/hr (01/03/22 0616)    sodium chloride       guaiFENesin-codeine, heparin (porcine), heparin (porcine), sodium chloride flush, sodium chloride, ondansetron **OR** ondansetron, polyethylene glycol, acetaminophen **OR** acetaminophen, morphine, melatonin    ALLERGIES:   Allergies as of 01/02/2022    (No Known Allergies)      OBJECTIVE:   height is 6' (1.829 m) and weight is 380 lb (172.4 kg) (abnormal). His temporal temperature is 97.1 °F (36.2 °C). His blood pressure is 152/85 (abnormal) and his pulse is 86. His respiration is 18 and oxygen saturation is 95%. No intake/output data recorded. PHYSICAL EXAM:  CONSTITUTIONAL: He is a 63y.o.-year-old who appears his stated age. He is alert and oriented x 3 and in no acute distress. HEENT: PERRL. No scleral icterus. No thrush, atraumatic, normocephalic. NECK: Supple, without cervical or supraclavicular lymphadenopathy:  CARDIOVASCULAR: S1 S2 RRR. Without murmer  RESPIRATORY & CHEST: Lungs are clear to auscultation and percussion. No wheezing, no crackles.  Good air movement  GASTROINTESTINAL & ABDOMEN: Soft, nontender, positive bowel sounds in all quadrants, non-distended, without hepatosplenomegaly. GENITOURINARY: Deferred. MUSCULOSKELETAL: No tenderness to palpation of the axial skeleton. There is no clubbing. No cyanosis. No edema of the lower extremities. SKIN OF BODY: No rash or jaundice. PSYCHIATRIC EVALUATION: Normal affect. Patient answers questions appropriately. HEMATOLOGIC/LYMPHATIC/ IMMUNOLOGIC: No palpable lymphadenopathy. NEUROLOGIC: Alert and oriented x 3. Groslly non-focal. Motor strength is 5+/5 in all muscle groups. The patient has a normal sensorium globally. LABS:  Lab Results   Component Value Date    WBC 7.6 01/02/2022    HGB 13.6 01/02/2022    HCT 39.2 (L) 01/02/2022     01/02/2022    CHOL 174 06/29/2021    TRIG 157 (H) 06/29/2021    HDL 38 (L) 06/29/2021    ALT 43 (H) 01/02/2022    AST 30 01/02/2022     01/03/2022    K 4.7 01/03/2022    CL 99 01/03/2022    CREATININE 1.0 01/03/2022    BUN 18 01/03/2022    CO2 24 01/03/2022    TSH 1.70 06/29/2021    PSA 0.46 02/13/2020    INR 1.21 (H) 01/02/2022       Lab Results   Component Value Date    GLUCOSE 152 (H) 01/03/2022    CALCIUM 9.3 01/03/2022     01/03/2022    K 4.7 01/03/2022    CO2 24 01/03/2022    CL 99 01/03/2022    BUN 18 01/03/2022    CREATININE 1.0 01/03/2022     IMAGING:  I reviewed the CTA chest from 1/2/2022 and my interpretation is as follows. Right lower lobe and left upper lobe pulmonary artery pulmonary embolism. Bilateral patchy infiltrates. IMPRESSION:   Bilateral acute pulmonary embolism  Pneumonia due to COVID-19      RECOMMENDATION:   Patient likely developed bilateral acute pulmonary embolism which was provoked in the setting of COVID-19. He is already been initiated on heparin drip. Echocardiogram as well as venous Doppler of lower extremities to rule out DVT is pending. Vascular surgery is already on board and awaiting echocardiogram results to decide about any further intervention like catheter directed thrombolysis.   Patient likely will not need any intervention. If no intervention is suggested by vascular, then he could be switched to NOAC upon discharge. He will need NOAC for at least 4 months for provoked PE in the setting of COVID-19. Patient also has COVID-19 pneumonia. He does not meet criteria for treatment with remdesivir/Actemra/baricitinib as he was diagnosed with Covid at least 10 days ago. CRP was noted to be elevated at 69. Patient is currently requiring oxygen due to bilateral PE. For benefit of doubt, he could be initiated on Decadron 6 mg daily with slow taper. Thank you for your consultation. Pulmonary does not have any further recommendations. Pulmonary will sign off. Farnaz Montanez MD  Pulmonary Critical Care and Sleep Medicine  1/3/2022, 1:48 PM    This note was completed using dragon medical speech recognition software. Grammatical errors, random word insertions, pronoun errors and incomplete sentences are occasional consequences of this technology due to software limitations. If there are questions or concerns about the content of this note of information contained within the body of this dictation they should be addressed with the provider for clarification.

## 2022-01-04 LAB
ANION GAP SERPL CALCULATED.3IONS-SCNC: 9 MMOL/L (ref 3–16)
APTT: 43.4 SEC (ref 26.2–38.6)
APTT: 77.5 SEC (ref 26.2–38.6)
BUN BLDV-MCNC: 22 MG/DL (ref 7–20)
C-REACTIVE PROTEIN: 28 MG/L (ref 0–5.1)
CALCIUM SERPL-MCNC: 9 MG/DL (ref 8.3–10.6)
CHLORIDE BLD-SCNC: 99 MMOL/L (ref 99–110)
CO2: 29 MMOL/L (ref 21–32)
CREAT SERPL-MCNC: 1.1 MG/DL (ref 0.9–1.3)
D DIMER: 2325 NG/ML DDU (ref 0–229)
GFR AFRICAN AMERICAN: >60
GFR NON-AFRICAN AMERICAN: >60
GLUCOSE BLD-MCNC: 147 MG/DL (ref 70–99)
HCT VFR BLD CALC: 37.7 % (ref 40.5–52.5)
HEMOGLOBIN: 12.9 G/DL (ref 13.5–17.5)
MCH RBC QN AUTO: 30.4 PG (ref 26–34)
MCHC RBC AUTO-ENTMCNC: 34.3 G/DL (ref 31–36)
MCV RBC AUTO: 88.7 FL (ref 80–100)
PDW BLD-RTO: 12.9 % (ref 12.4–15.4)
PLATELET # BLD: 287 K/UL (ref 135–450)
PMV BLD AUTO: 7.2 FL (ref 5–10.5)
POTASSIUM SERPL-SCNC: 4.4 MMOL/L (ref 3.5–5.1)
RBC # BLD: 4.25 M/UL (ref 4.2–5.9)
SODIUM BLD-SCNC: 137 MMOL/L (ref 136–145)
WBC # BLD: 8.6 K/UL (ref 4–11)

## 2022-01-04 PROCEDURE — C1894 INTRO/SHEATH, NON-LASER: HCPCS

## 2022-01-04 PROCEDURE — 99152 MOD SED SAME PHYS/QHP 5/>YRS: CPT

## 2022-01-04 PROCEDURE — 6370000000 HC RX 637 (ALT 250 FOR IP): Performed by: NURSE PRACTITIONER

## 2022-01-04 PROCEDURE — 85379 FIBRIN DEGRADATION QUANT: CPT

## 2022-01-04 PROCEDURE — 2580000003 HC RX 258: Performed by: SURGERY

## 2022-01-04 PROCEDURE — 75743 ARTERY X-RAYS LUNGS: CPT

## 2022-01-04 PROCEDURE — 2580000003 HC RX 258

## 2022-01-04 PROCEDURE — 6370000000 HC RX 637 (ALT 250 FOR IP): Performed by: FAMILY MEDICINE

## 2022-01-04 PROCEDURE — 36014 PLACE CATHETER IN ARTERY: CPT

## 2022-01-04 PROCEDURE — 94640 AIRWAY INHALATION TREATMENT: CPT

## 2022-01-04 PROCEDURE — 86140 C-REACTIVE PROTEIN: CPT

## 2022-01-04 PROCEDURE — 2700000000 HC OXYGEN THERAPY PER DAY

## 2022-01-04 PROCEDURE — 37211 THROMBOLYTIC ART THERAPY: CPT | Performed by: SURGERY

## 2022-01-04 PROCEDURE — 99152 MOD SED SAME PHYS/QHP 5/>YRS: CPT | Performed by: SURGERY

## 2022-01-04 PROCEDURE — 94761 N-INVAS EAR/PLS OXIMETRY MLT: CPT

## 2022-01-04 PROCEDURE — 2580000003 HC RX 258: Performed by: NURSE PRACTITIONER

## 2022-01-04 PROCEDURE — 36592 COLLECT BLOOD FROM PICC: CPT

## 2022-01-04 PROCEDURE — 6360000002 HC RX W HCPCS

## 2022-01-04 PROCEDURE — 2580000003 HC RX 258: Performed by: FAMILY MEDICINE

## 2022-01-04 PROCEDURE — 93970 EXTREMITY STUDY: CPT

## 2022-01-04 PROCEDURE — 99153 MOD SED SAME PHYS/QHP EA: CPT

## 2022-01-04 PROCEDURE — 6360000002 HC RX W HCPCS: Performed by: SURGERY

## 2022-01-04 PROCEDURE — 85027 COMPLETE CBC AUTOMATED: CPT

## 2022-01-04 PROCEDURE — 37211 THROMBOLYTIC ART THERAPY: CPT

## 2022-01-04 PROCEDURE — 85730 THROMBOPLASTIN TIME PARTIAL: CPT

## 2022-01-04 PROCEDURE — 2709999900 HC NON-CHARGEABLE SUPPLY

## 2022-01-04 PROCEDURE — 36415 COLL VENOUS BLD VENIPUNCTURE: CPT

## 2022-01-04 PROCEDURE — 6360000002 HC RX W HCPCS: Performed by: INTERNAL MEDICINE

## 2022-01-04 PROCEDURE — 75820 VEIN X-RAY ARM/LEG: CPT

## 2022-01-04 PROCEDURE — 2000000000 HC ICU R&B

## 2022-01-04 PROCEDURE — B31S1ZZ FLUOROSCOPY OF RIGHT PULMONARY ARTERY USING LOW OSMOLAR CONTRAST: ICD-10-PCS | Performed by: SURGERY

## 2022-01-04 PROCEDURE — 2500000003 HC RX 250 WO HCPCS

## 2022-01-04 PROCEDURE — 02FR3Z0 FRAGMENTATION OF LEFT PULMONARY ARTERY, PERCUTANEOUS APPROACH, ULTRASONIC: ICD-10-PCS | Performed by: SURGERY

## 2022-01-04 PROCEDURE — 6370000000 HC RX 637 (ALT 250 FOR IP): Performed by: PHYSICIAN ASSISTANT

## 2022-01-04 PROCEDURE — 36014 PLACE CATHETER IN ARTERY: CPT | Performed by: SURGERY

## 2022-01-04 PROCEDURE — C1751 CATH, INF, PER/CENT/MIDLINE: HCPCS

## 2022-01-04 PROCEDURE — 6360000004 HC RX CONTRAST MEDICATION: Performed by: SURGERY

## 2022-01-04 PROCEDURE — B31T1ZZ FLUOROSCOPY OF LEFT PULMONARY ARTERY USING LOW OSMOLAR CONTRAST: ICD-10-PCS | Performed by: SURGERY

## 2022-01-04 PROCEDURE — 80048 BASIC METABOLIC PNL TOTAL CA: CPT

## 2022-01-04 PROCEDURE — C1769 GUIDE WIRE: HCPCS

## 2022-01-04 PROCEDURE — 3E06317 INTRODUCTION OF OTHER THROMBOLYTIC INTO CENTRAL ARTERY, PERCUTANEOUS APPROACH: ICD-10-PCS | Performed by: SURGERY

## 2022-01-04 RX ORDER — IODIXANOL 320 MG/ML
32 INJECTION, SOLUTION INTRAVASCULAR
Status: COMPLETED | OUTPATIENT
Start: 2022-01-04 | End: 2022-01-04

## 2022-01-04 RX ORDER — OXYCODONE HYDROCHLORIDE AND ACETAMINOPHEN 5; 325 MG/1; MG/1
1 TABLET ORAL EVERY 4 HOURS PRN
Status: DISCONTINUED | OUTPATIENT
Start: 2022-01-04 | End: 2022-01-05 | Stop reason: HOSPADM

## 2022-01-04 RX ORDER — HEPARIN SODIUM 10000 [USP'U]/100ML
250 INJECTION, SOLUTION INTRAVENOUS CONTINUOUS
Status: DISCONTINUED | OUTPATIENT
Start: 2022-01-04 | End: 2022-01-05

## 2022-01-04 RX ORDER — HEPARIN SODIUM 10000 [USP'U]/100ML
1200 INJECTION, SOLUTION INTRAVENOUS CONTINUOUS
Status: DISCONTINUED | OUTPATIENT
Start: 2022-01-04 | End: 2022-01-05

## 2022-01-04 RX ORDER — LOSARTAN POTASSIUM 25 MG/1
50 TABLET ORAL DAILY
Status: DISCONTINUED | OUTPATIENT
Start: 2022-01-04 | End: 2022-01-05 | Stop reason: HOSPADM

## 2022-01-04 RX ORDER — OXYCODONE HYDROCHLORIDE AND ACETAMINOPHEN 5; 325 MG/1; MG/1
2 TABLET ORAL EVERY 4 HOURS PRN
Status: DISCONTINUED | OUTPATIENT
Start: 2022-01-04 | End: 2022-01-05 | Stop reason: HOSPADM

## 2022-01-04 RX ORDER — HEPARIN SODIUM 1000 [USP'U]/ML
30 INJECTION, SOLUTION INTRAVENOUS; SUBCUTANEOUS PRN
Status: DISCONTINUED | OUTPATIENT
Start: 2022-01-04 | End: 2022-01-05

## 2022-01-04 RX ORDER — ACETAMINOPHEN 325 MG/1
650 TABLET ORAL EVERY 4 HOURS PRN
Status: DISCONTINUED | OUTPATIENT
Start: 2022-01-04 | End: 2022-01-05 | Stop reason: HOSPADM

## 2022-01-04 RX ORDER — SODIUM CHLORIDE 9 MG/ML
INJECTION, SOLUTION INTRAVENOUS CONTINUOUS
Status: DISCONTINUED | OUTPATIENT
Start: 2022-01-04 | End: 2022-01-05

## 2022-01-04 RX ADMIN — BENZONATATE 200 MG: 100 CAPSULE ORAL at 09:33

## 2022-01-04 RX ADMIN — Medication 2 PUFF: at 17:28

## 2022-01-04 RX ADMIN — Medication 2 PUFF: at 09:27

## 2022-01-04 RX ADMIN — Medication 2 PUFF: at 09:28

## 2022-01-04 RX ADMIN — Medication 2 PUFF: at 17:27

## 2022-01-04 RX ADMIN — LOSARTAN POTASSIUM 50 MG: 25 TABLET, FILM COATED ORAL at 09:32

## 2022-01-04 RX ADMIN — BENZONATATE 200 MG: 100 CAPSULE ORAL at 21:50

## 2022-01-04 RX ADMIN — SODIUM CHLORIDE: 9 INJECTION, SOLUTION INTRAVENOUS at 23:56

## 2022-01-04 RX ADMIN — DEXAMETHASONE SODIUM PHOSPHATE 10 MG: 10 INJECTION, SOLUTION INTRAMUSCULAR; INTRAVENOUS at 09:32

## 2022-01-04 RX ADMIN — HEPARIN SODIUM 1200 UNITS/HR: 5000 INJECTION INTRAVENOUS; SUBCUTANEOUS at 22:41

## 2022-01-04 RX ADMIN — SODIUM CHLORIDE, PRESERVATIVE FREE 10 ML: 5 INJECTION INTRAVENOUS at 09:33

## 2022-01-04 RX ADMIN — Medication 10 ML: at 20:00

## 2022-01-04 RX ADMIN — SODIUM CHLORIDE, PRESERVATIVE FREE 10 ML: 5 INJECTION INTRAVENOUS at 21:51

## 2022-01-04 RX ADMIN — ALTEPLASE 10 MG: KIT at 13:00

## 2022-01-04 RX ADMIN — HEPARIN SODIUM AND DEXTROSE 250 UNITS/HR: 10000; 5 INJECTION INTRAVENOUS at 16:01

## 2022-01-04 RX ADMIN — IODIXANOL 32 ML: 320 INJECTION, SOLUTION INTRAVASCULAR at 11:56

## 2022-01-04 RX ADMIN — SODIUM CHLORIDE: 9 INJECTION, SOLUTION INTRAVENOUS at 03:00

## 2022-01-04 RX ADMIN — LEVOTHYROXINE SODIUM 75 MCG: 0.07 TABLET ORAL at 05:56

## 2022-01-04 RX ADMIN — SODIUM CHLORIDE: 9 INJECTION, SOLUTION INTRAVENOUS at 15:59

## 2022-01-04 RX ADMIN — AMLODIPINE BESYLATE 10 MG: 5 TABLET ORAL at 21:50

## 2022-01-04 RX ADMIN — MELATONIN TAB 3 MG 3 MG: 3 TAB at 21:50

## 2022-01-04 ASSESSMENT — PAIN SCALES - GENERAL
PAINLEVEL_OUTOF10: 0

## 2022-01-04 NOTE — PROGRESS NOTES
VASCULAR    Unchanged overnight. Remains on 3 L O2 NC    VSS afeb  Exam unchanged    A/P: Acute PE with RV strain and hypoxia   Plan EKOS thrombolysis today. DC heparin drip now. Pt agrees to proceed.      Zoraida Runner

## 2022-01-04 NOTE — OP NOTE
[address]                     [facility], [address]                                OPERATIVE REPORT    PATIENT NAME: Stephanie Carlisle                 :        1960  MED REC NO:   [id number]                          ROOM:       [id number]  ACCOUNT NO:   [de-identified]                           ADMIT DATE: 2022  PROVIDER:     Joy Burrell MD    DATE OF PROCEDURE:  2022    PREPROCEDURE DIAGNOSIS:  Acute bilateral pulmonary emboli with right  ventricular strain and hypoxia. POSTPROCEDURE DIAGNOSIS:  Acute bilateral pulmonary emboli with right  ventricular strain and hypoxia. OPERATION PERFORMED:  1. Ultrasound-guided right femoral venous catheterization. 2.  Bilateral pulmonary angiogram via catheter positioned in the main  pulmonary artery. 3.  Insertio of right pulmonary artery EKOS thrombolysis catheter. 4.  Insertion of left pulmonary artery EKOS thrombolysis catheter. SURGEON:  Joy Burrell MD    ANESTHESIA:  1% lidocaine with conscious sedation (fentanyl 150 mcg and  Versed 2 mg IV push). Total sedation time 40 minutes. ESTIMATED BLOOD LOSS:  Less than 50 mL. HISTORY:  The patient is a 61-year-old gentleman with recently diagnosed  COVID who presented to the hospital with sudden onset of worsening  shortness of breath and was found on workup to have bilateral pulmonary  emboli. He remained on supplemental oxygen with minimal exertion  leading to hypoxia. Echo demonstrated right ventricular heart strain  and it was recommended he undergo catheter directed thrombolysis with  EKOS technology. He agreed understanding the risks, benefits and other  options. TECHNIQUE:  The patient was brought to the angiogram department and  placed on the table in the supine position.   He was attached to  continuous oxygen saturation, EKG and blood pressure monitoring and was  monitored throughout the case by an independent medical professional.   Sedation was administered and supervised personally by myself by this  independent professional.  After adequate sedation was obtained, the  bilateral groins were prepped and draped in a sterile fashion. Ultrasound was passed on the field and sterilely used to identify the  right common femoral artery. Its patency was confirmed by compression  and visualization. Lidocaine was infiltrated over the vein and using  ultrasound for guidance, the vein was punctured without difficulty. A  stiff angled Glidewire was advanced proximally and a 6-Belarusian sheath was  placed. Hand injection of contrast via the sheath was performed to  confirm that there was no iliac thrombophlebitis. With this having been  proven, the wire was advanced proximally into the right atria. Over the  wire was then passed a JR-4 catheter which was used to direct the wire  out the pulmonary outflow tract and into the main pulmonary artery. The  JR-4 catheter was exchanged for a pigtail catheter which was then  positioned in the main pulmonary artery and flushed and nonselective  bilateral pulmonary angiogram was performed which confirmed the presence  of bilateral pulmonary emboli. The pigtail catheter was then removed  over a wire and the JR-4 wire was re-advanced and used to direct the  wire into the right pulmonary artery into the segmental and subsegmental  branches in the lower lobes. After accomplishing this, that catheter  was removed and the EKOS infusion catheter was advanced over the wire  and positioned appropriately. The EKOS wire was inserted and secured in  place. Next, the right common femoral vein was re-punctured without  difficulty and a wire was  advanced. A 6-Belarusian sheath was placed and  secured in place with a suture in conjunction with the other sheath.    The wire was then advanced in the right atria and a JR-4 catheter was  advanced in this position, used to direct the wire out the pulmonary  outflow tract and eventually out into the left pulmonary artery and its  segmental and subsegmental branches. JR-4 catheter was removed and the  EKOS infusion catheter was inserted and advanced without difficulty into  the branches of the left pulmonary artery. The wire was then inserted  and secured in place without difficulty. All lumens were flushed and  TPA infusion was initiated bilaterally at 1 mg per hour for 6 hours. Clean sterile dressing was applied. The patient was transferred  directly to the CVU having tolerated the procedure well. FINDINGS:  1.  Bilateral pulmonary emboli as described above. 2.  Successful insertion of thrombolysis catheters with initiation of  therapy.         Helga Estrada MD    D: 01/04/2022 12:19:45       T: 01/04/2022 13:10:54     [id number]  Job#: [id number]     Doc#: [id number]    CC:

## 2022-01-04 NOTE — PROGRESS NOTES
Hospitalist Progress Note      PCP: Vashti Clarke MD    Date of Admission: 1/2/2022    Chief Complaint: Shortness of breath    Hospital Course: Vanessa Sandoval is a 63 y.o. male with h/o HTN , dyslipidemia presented with c/o dyspnea and congestion . Was tested positive for COVID  At a an outside facility late last month. He is found to be hypoxic with ambulation with sats dropping in high 80's. Does not wear O2 at baseline. Chest CT showed b/l Pulmonary embolism with cardiac strain . The pt remains hemodynamically stable. He has been started on Heparin   Admitted for further work-up and treatment. Subjective: Patient is status post EKOS today. Tolerating procedure well. Denies chest pain. Has shortness of breath when he is trying to be up and moving. Discussed likely need for home oxygen. 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 Explained he may need to be off work for prolonged period of time as he cannot do heavy lifting while wearing an oxygen tank. He verbalized understanding of this. Reviewed plan of care, denied further needs or questions. Assessment/Plan:    Bilateral PE with cor pulmonale  Left leg DVT  -Seen on CT PE imaging  -Vascular consulted, EKOS 1/4/2022  -Echo showed RV strain   - lower extremity Doppler showed left lower extremity thrombosis.   -Remains hemodynamically stable  -Pulmonology consult, if no intervention suggested by vascular then can be switched to DOAC upon discharge.,  Will need at least 4 months for provoked PE in setting of COVID-19.  -Was on heparin drip    Acute hypoxic respiratory failure  -Secondary to bilateral PEs and Covid  -Patient with a air hunger symptoms of dyspnea that worsens with conversation and minimal exertion  -Currently on 3-1/2 L nasal cannula, continue to attempt to wean off as able    COVID-19 pneumonia  -Remdesivir initiated, day 2 out of 5  -Infectious disease consult given Covid test +3 weeks ago  -Continue Decadron 10 mg IV daily  -Reports cough, schedule Tessalon Perles, add Robitussin with codeine cough syrup  -D-dimer on admit 4257, today 2371  -CRP on admit 69.9, today 51.6    Hypertension  -BP elevated, will resume losartan but at decreased dose, 50 mg daily versus 100 mg daily that he takes at home. Still holding hydrochlorothiazide for now.     Poor IV access  -PICC line functioning in right upper arm    Morbid obesity  -Courage lifestyle changes    Has medical history hypertension, hyperlipidemia, hypothyroidism  -Continue home medications as ordered      Active Hospital Problems    Diagnosis     Acute respiratory failure with hypoxia (HCC) [J96.01]     Pneumonia due to COVID-19 virus [U07.1, J12.82]     Lactic acidosis [E87.2]     Elevated ALT measurement [R74.01]     Elevated d-dimer [R79.89]     Acute pulmonary embolism with acute cor pulmonale (HCC) [I26.09]     Prediabetes [R73.03]     Essential hypertension [I10]     Morbid obesity due to excess calories ([facility] [address] [E66.01]        Medications:  Reviewed    Infusion Medications    sodium chloride      heparin (PORCINE) Infusion      heparin (PORCINE) Infusion      sodium chloride 100 mL/hr at 01/04/22 0300    sodium chloride      sodium chloride       Scheduled Medications    losartan  50 mg Oral Daily    alteplase (ACTIVASE) 10 mg in 0.9% sodium chloride 500 mL - PERIPHERAL ARTERIAL OCCLUSION  10 mg IntraCATHeter Once    albuterol sulfate HFA  2 puff Inhalation 4x daily    ipratropium  2 puff Inhalation 4x daily    budesonide-formoterol  2 puff Inhalation BID    benzonatate  200 mg Oral TID    dexamethasone  10 mg IntraVENous Daily    lidocaine 1 % injection  5 mL IntraDERmal Once    sodium chloride flush  5-40 mL IntraVENous 2 times per day    amLODIPine  10 mg Oral Daily    aspirin  81 mg Oral Daily    levothyroxine  75 mcg Oral Daily    sodium chloride flush  5-40 mL IntraVENous 2 times per day     PRN Meds: acetaminophen, oxyCODONE-acetaminophen **OR** oxyCODONE-acetaminophen, heparin (porcine), guaiFENesin-codeine, sodium chloride flush, sodium chloride, sodium chloride flush, sodium chloride, ondansetron **OR** ondansetron, polyethylene glycol, [DISCONTINUED] acetaminophen **OR** acetaminophen, morphine, melatonin    No intake or output data in the 24 hours ending 01/04/22 1302    Physical Exam Performed:    BP (!) 143/78   Pulse 73   Temp 98.5 °F (36.9 °C) (Axillary)   Resp 12   Ht 6' (1.829 m)   Wt (!) 380 lb (172.4 kg)   SpO2 95%   BMI 51.54 kg/m²     General appearance: No apparent distress, appears stated age and cooperative. HEENT: Pupils equal, round, and reactive to light. Conjunctivae/corneas clear. Neck: Supple, with full range of motion. No jugular venous distention. Trachea midline. Respiratory: Dyspnea with minimal conversation, diminished bilaterally   cardiovascular: Regular rate and rhythm with normal S1/S2 without murmurs, rubs or gallops. Abdomen: Soft, non-tender, non-distended with normal bowel sounds. Musculoskeletal: No clubbing, cyanosis. Trace edema bilaterally. Full range of motion without deformity. Skin: Chronic skin discoloration bilateral lower extremity of darkened skin likely secondary to venous stasis  Neurologic:  Neurovascularly intact without any focal sensory/motor deficits.  Cranial nerves: II-XII intact, grossly non-focal.  Psychiatric: Alert and oriented, thought content appropriate, normal insight  Capillary Refill: Brisk,< 3 seconds   Peripheral Pulses: +2 palpable, equal bilaterally       Labs:   Recent Labs     01/02/22  1516 01/04/22  0419   WBC 7.6 8.6   HGB 13.6 12.9*   HCT 39.2* 37.7*    287     Recent Labs     01/02/22  1516 01/03/22  0841 01/04/22  0419   * 137 137   K 4.0 4.7 4.4   CL 98* 99 99   CO2 26 24 29   BUN 20 18 22*   CREATININE 1.0 1.0 1.1   CALCIUM 9.5 9.3 9.0     Recent Labs     01/02/22  1516   AST 30   ALT 43*   BILITOT 0.7 ALKPHOS 52     Recent Labs     01/02/22  1516   INR 1.21*     Recent Labs     01/02/22  1516   TROPONINI 0.29*       Urinalysis:      Lab Results   Component Value Date    NITRU Negative 09/05/2019    WBCUA 2 09/05/2019    RBCUA 40 09/05/2019    BLOODU neg 02/13/2020    BLOODU LARGE 09/05/2019    SPECGRAV 1.020 02/13/2020    SPECGRAV 1.028 09/05/2019    GLUCOSEU neg 02/13/2020    GLUCOSEU 100 09/05/2019       Radiology:  XR CHEST PORTABLE   Final Result   Right-sided PICC terminating in the right atrium      Bilateral airspace opacities persist         CT CHEST PULMONARY EMBOLISM W CONTRAST   Final Result   Bilateral pulmonary emboli      Right ventricle dilated compared to the left suggesting right ventricular   heart strain      Findings were discussed with Betty ROE at 5:16 pm on 1/2/2022. RECOMMENDATIONS:   Unavailable         XR CHEST PORTABLE   Final Result   Bilateral airspace opacities suggesting COVID pneumonia         VL Extremity Venous Bilateral    (Results Pending)           DVT Prophylaxis: Heparin drip  Diet: Diet NPO Exceptions are: Sips of Water with Meds  Code Status: Full Code    PT/OT Eval Status: No acute needs    Dispo -home once medically stable    Michelle Roger APRN - CNP      NOTE:  This report was transcribed using voice recognition software. Every effort was made to ensure accuracy; however, inadvertent computerized transcription errors may be present.

## 2022-01-04 NOTE — PROGRESS NOTES
Pt. Admitted to CVU room 13 from cath lab with EKOS infusing in right groin. 2 venous sheaths in place. Site without oozing or hematoma. Placed on monitor, vital signs obtained. Reviewed the plan of care with patient and the importance/need to keep right leg straight and must lay supine. Pt. Verbalized understanding. Will continue to monitor.     Electronically signed by Naida Harris RN on 1/4/2022 at 1:20 PM

## 2022-01-04 NOTE — BRIEF OP NOTE
Brief Postoperative Note      Patient: Keenan Campbell  YOB: 1960  MRN: [id number]    Date of Procedure:  1/4/2022    Pre-Op Diagnosis: PE with hypoxia and strain    Post-Op Diagnosis: Same     Procedure: pulmonary angiogram + insertion of B EKOS thrombolysis catheters    Anesthesia: local with sedation    Estimated Blood Loss (mL): Minimal    Complications: None    Findings: B PEs - good catheter placement    Electronically signed by Roger Moody MD on 1/4/2022 at 11:44 AM

## 2022-01-04 NOTE — PROGRESS NOTES
VSS. Call light within reach. Assessment complete at this time. No signs of distress or additional needs at this time. All scheduled medications given.

## 2022-01-05 VITALS
BODY MASS INDEX: 42.66 KG/M2 | SYSTOLIC BLOOD PRESSURE: 109 MMHG | TEMPERATURE: 98 F | DIASTOLIC BLOOD PRESSURE: 90 MMHG | OXYGEN SATURATION: 90 % | WEIGHT: 315 LBS | HEART RATE: 95 BPM | HEIGHT: 72 IN | RESPIRATION RATE: 18 BRPM

## 2022-01-05 LAB
ANION GAP SERPL CALCULATED.3IONS-SCNC: 10 MMOL/L (ref 3–16)
APTT: 42.7 SEC (ref 26.2–38.6)
BUN BLDV-MCNC: 24 MG/DL (ref 7–20)
C-REACTIVE PROTEIN: 16.2 MG/L (ref 0–5.1)
CALCIUM SERPL-MCNC: 8.8 MG/DL (ref 8.3–10.6)
CHLORIDE BLD-SCNC: 105 MMOL/L (ref 99–110)
CO2: 24 MMOL/L (ref 21–32)
CREAT SERPL-MCNC: 0.9 MG/DL (ref 0.9–1.3)
D DIMER: >5250 NG/ML DDU (ref 0–229)
GFR AFRICAN AMERICAN: >60
GFR NON-AFRICAN AMERICAN: >60
GLUCOSE BLD-MCNC: 131 MG/DL (ref 70–99)
HCT VFR BLD CALC: 36.9 % (ref 40.5–52.5)
HEMOGLOBIN: 12.5 G/DL (ref 13.5–17.5)
MCH RBC QN AUTO: 30.6 PG (ref 26–34)
MCHC RBC AUTO-ENTMCNC: 34 G/DL (ref 31–36)
MCV RBC AUTO: 89.9 FL (ref 80–100)
PDW BLD-RTO: 12.7 % (ref 12.4–15.4)
PLATELET # BLD: 232 K/UL (ref 135–450)
PMV BLD AUTO: 7 FL (ref 5–10.5)
POTASSIUM SERPL-SCNC: 4 MMOL/L (ref 3.5–5.1)
RBC # BLD: 4.1 M/UL (ref 4.2–5.9)
SODIUM BLD-SCNC: 139 MMOL/L (ref 136–145)
WBC # BLD: 8 K/UL (ref 4–11)

## 2022-01-05 PROCEDURE — 85379 FIBRIN DEGRADATION QUANT: CPT

## 2022-01-05 PROCEDURE — 6360000002 HC RX W HCPCS: Performed by: NURSE PRACTITIONER

## 2022-01-05 PROCEDURE — 86140 C-REACTIVE PROTEIN: CPT

## 2022-01-05 PROCEDURE — 6370000000 HC RX 637 (ALT 250 FOR IP): Performed by: NURSE PRACTITIONER

## 2022-01-05 PROCEDURE — 94760 N-INVAS EAR/PLS OXIMETRY 1: CPT

## 2022-01-05 PROCEDURE — 80048 BASIC METABOLIC PNL TOTAL CA: CPT

## 2022-01-05 PROCEDURE — 94150 VITAL CAPACITY TEST: CPT

## 2022-01-05 PROCEDURE — 85730 THROMBOPLASTIN TIME PARTIAL: CPT

## 2022-01-05 PROCEDURE — 2580000003 HC RX 258: Performed by: FAMILY MEDICINE

## 2022-01-05 PROCEDURE — 94640 AIRWAY INHALATION TREATMENT: CPT

## 2022-01-05 PROCEDURE — 2700000000 HC OXYGEN THERAPY PER DAY

## 2022-01-05 PROCEDURE — 6370000000 HC RX 637 (ALT 250 FOR IP): Performed by: FAMILY MEDICINE

## 2022-01-05 PROCEDURE — 94680 O2 UPTK RST&XERS DIR SIMPLE: CPT

## 2022-01-05 PROCEDURE — 6360000002 HC RX W HCPCS: Performed by: INTERNAL MEDICINE

## 2022-01-05 PROCEDURE — 85027 COMPLETE CBC AUTOMATED: CPT

## 2022-01-05 PROCEDURE — 99231 SBSQ HOSP IP/OBS SF/LOW 25: CPT | Performed by: SURGERY

## 2022-01-05 RX ORDER — ALBUTEROL SULFATE 90 UG/1
2 AEROSOL, METERED RESPIRATORY (INHALATION) 4 TIMES DAILY
Qty: 18 G | Refills: 0 | Status: SHIPPED | OUTPATIENT
Start: 2022-01-05

## 2022-01-05 RX ORDER — HEPARIN SODIUM 1000 [USP'U]/ML
4000 INJECTION, SOLUTION INTRAVENOUS; SUBCUTANEOUS PRN
Status: DISCONTINUED | OUTPATIENT
Start: 2022-01-05 | End: 2022-01-05 | Stop reason: HOSPADM

## 2022-01-05 RX ORDER — HEPARIN SODIUM 1000 [USP'U]/ML
2000 INJECTION, SOLUTION INTRAVENOUS; SUBCUTANEOUS PRN
Status: DISCONTINUED | OUTPATIENT
Start: 2022-01-05 | End: 2022-01-05 | Stop reason: HOSPADM

## 2022-01-05 RX ORDER — BUDESONIDE AND FORMOTEROL FUMARATE DIHYDRATE 160; 4.5 UG/1; UG/1
2 AEROSOL RESPIRATORY (INHALATION) 2 TIMES DAILY
Qty: 10.2 G | Refills: 0 | Status: SHIPPED | OUTPATIENT
Start: 2022-01-05

## 2022-01-05 RX ORDER — DEXAMETHASONE 2 MG/1
TABLET ORAL
Qty: 13 TABLET | Refills: 0 | Status: SHIPPED | OUTPATIENT
Start: 2022-01-05 | End: 2022-01-13 | Stop reason: CLARIF

## 2022-01-05 RX ORDER — HEPARIN SODIUM 10000 [USP'U]/100ML
5-30 INJECTION, SOLUTION INTRAVENOUS CONTINUOUS
Status: DISCONTINUED | OUTPATIENT
Start: 2022-01-05 | End: 2022-01-05

## 2022-01-05 RX ORDER — LOSARTAN POTASSIUM 50 MG/1
50 TABLET ORAL DAILY
Qty: 30 TABLET | Refills: 0 | Status: SHIPPED | OUTPATIENT
Start: 2022-01-06

## 2022-01-05 RX ORDER — BENZONATATE 200 MG/1
200 CAPSULE ORAL 3 TIMES DAILY
Qty: 21 CAPSULE | Refills: 0 | Status: SHIPPED | OUTPATIENT
Start: 2022-01-05 | End: 2022-01-10 | Stop reason: SDUPTHER

## 2022-01-05 RX ORDER — CODEINE PHOSPHATE AND GUAIFENESIN 10; 100 MG/5ML; MG/5ML
5 SOLUTION ORAL EVERY 4 HOURS PRN
Qty: 200 ML | Refills: 0 | Status: SHIPPED | OUTPATIENT
Start: 2022-01-05 | End: 2022-01-12

## 2022-01-05 RX ORDER — OXYCODONE HYDROCHLORIDE AND ACETAMINOPHEN 5; 325 MG/1; MG/1
1 TABLET ORAL EVERY 8 HOURS PRN
Qty: 9 TABLET | Refills: 0 | Status: SHIPPED | OUTPATIENT
Start: 2022-01-05 | End: 2022-01-08

## 2022-01-05 RX ADMIN — LOSARTAN POTASSIUM 50 MG: 25 TABLET, FILM COATED ORAL at 08:05

## 2022-01-05 RX ADMIN — Medication 2 PUFF: at 13:15

## 2022-01-05 RX ADMIN — HEPARIN SODIUM AND DEXTROSE 12 UNITS/KG/HR: 10000; 5 INJECTION INTRAVENOUS at 09:00

## 2022-01-05 RX ADMIN — BENZONATATE 200 MG: 100 CAPSULE ORAL at 08:05

## 2022-01-05 RX ADMIN — LEVOTHYROXINE SODIUM 75 MCG: 0.07 TABLET ORAL at 08:05

## 2022-01-05 RX ADMIN — ASPIRIN 81 MG: 81 TABLET, COATED ORAL at 08:05

## 2022-01-05 RX ADMIN — RIVAROXABAN 15 MG: 15 TABLET, FILM COATED ORAL at 11:40

## 2022-01-05 RX ADMIN — Medication 10 ML: at 08:06

## 2022-01-05 RX ADMIN — GUAIFENESIN AND CODEINE PHOSPHATE 5 ML: 100; 10 SOLUTION ORAL at 11:59

## 2022-01-05 RX ADMIN — DEXAMETHASONE SODIUM PHOSPHATE 10 MG: 10 INJECTION, SOLUTION INTRAMUSCULAR; INTRAVENOUS at 09:59

## 2022-01-05 ASSESSMENT — PAIN SCALES - GENERAL
PAINLEVEL_OUTOF10: 0

## 2022-01-05 NOTE — PROGRESS NOTES
CLINICAL PHARMACY NOTE: MEDS TO BEDS    Total # of Prescriptions Filled: 9   The following medications were delivered to the patient:  Dexamethasone 2mg  Xarelto starter pack  Symbicort 160/4.5mcg  Losartan 50mg  Guaifenesin/codeine 100/10 soln  Oxycodone/APAP 5/325mg  Albuterol inhaler  Spiriva respimat 2.5mcg inhaler- replaced atrovent  Benzonatate 200mg    Additional Documentation:    Medications were picked up in the Outpatient Pharmacy by patient    Neyda Cole

## 2022-01-05 NOTE — CARE COORDINATION
Patient discharged 1/5/2022 to home with [address]  PHONE; [phone]: [phone]    Oxygen from Name:  Marcella Delgado  Phone:  [phone]  Fax:      [phone]. Order/ AVS faxed and agency notifed. No other CM needs at this time.     All discharge needs met per case management    MALIK AndinoN, CCM, RN  [facility]  [phone]

## 2022-01-05 NOTE — PROGRESS NOTES
EKOS catheters removed. Bilateral venous sheaths remain in place. Pt tolerated well. Right and Left EKOS infused for 6 hours, 12 minutes.

## 2022-01-05 NOTE — CARE COORDINATION
Referral to [facility].     Gissel Fraire, MALIKN, CCM, RN  [facility]  [phone]

## 2022-01-05 NOTE — FLOWSHEET NOTE
01/05/22 0855   Oxygen Therapy   SpO2 (!) 85 %   Pulse Oximeter Device Mode Continuous   Pulse Oximeter Device Location Finger   O2 Device Nasal cannula   O2 Flow Rate (L/min) 3 L/min     Pt ambulated to bathroom and back. Tolerated well. Pt noted to be SOB at times with increase coughing during increased activity. Once back in bed O2 saturation above 92%.  Elijah Burt RN

## 2022-01-05 NOTE — FLOWSHEET NOTE
01/05/22 0800   Output (mL)   Urine 50 mL     Elder removed. Pt able to void after removal with no issues. No complaints of pain or blood noted in urine.  Lance Wills RN

## 2022-01-05 NOTE — DISCHARGE INSTR - COC
Continuity of Care Form    Patient Name: Manuela Morales   :  1960  MRN:  [id number]    Admit date:  2022  Discharge date:  2022    Code Status Order: Full Code   Advance Directives:      Admitting Physician:  Geovanny Montez MD  PCP: Roselia Elizabeth MD    Discharging Nurse: [facility] Unit/Room#: CVU-2913/2913-01  Discharging Unit Phone Number: [phone]    Emergency Contact:   Extended Emergency Contact Information  Primary Emergency Contact: Veronica Amanda  Address: [facility], [address] [facility] [address] [facility] Phone: [phone]  Mobile Phone: [phone]  Relation: Spouse    Past Surgical History:  Past Surgical History:   Procedure Laterality Date    COLONOSCOPY  2012    COLONOSCOPY  10/14/2014    KNEE SURGERY      OTHER SURGICAL HISTORY      epidural injection x 2    TOTAL HIP ARTHROPLASTY Right 2017    Dr Мария Eduardo Messjoe       Immunization History:   Immunization History   Administered Date(s) Administered    Influenza, Olive Patron, Recombinant, IM PF (Flublok 18 yrs and older) 2018    Tdap (Boostrix, Adacel) 2014       Active Problems:  Patient Active Problem List   Diagnosis Code    Hyperlipidemia E78.5    Morbid obesity due to excess calories ([facility] [address] E66.01    Essential hypertension I10    Prediabetes R73.03    Subclinical hypothyroidism E03.8    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    Herniated lumbar intervertebral disc M51.26    Primary osteoarthritis of left hip M16.12    Fatty liver K76.0    Acute pulmonary embolism with acute cor pulmonale (HCC) I26.09    Acute respiratory failure with hypoxia ([facility] [address] J96.01    Pneumonia due to COVID-19 virus U07.1, J12.82    Lactic acidosis E87.2    Elevated ALT measurement R74.01    Elevated d-dimer R79.89       Isolation/Infection:   Isolation            Droplet Plus          Patient Infection Status       Infection Onset Added Last Indicated Last Indicated By Review Planned Expiration Resolved Resolved By    COVID-19 01/03/22 01/03/22 01/03/22 COVID-19 01/10/22 01/17/22      Resolved    COVID-19 (Rule Out) 01/03/22 01/03/22 01/03/22 COVID-19 (Ordered)   01/03/22 Rule-Out Test Resulted            Nurse Assessment:  Last Vital Signs: /83   Pulse 80   Temp 98 °F (36.7 °C) (Temporal)   Resp 18   Ht 6' (1.829 m)   Wt (!) 361 lb 12.4 oz (164.1 kg)   SpO2 (!) 85%   BMI 49.07 kg/m²     Last documented pain score (0-10 scale): Pain Level: 0  Last Weight:   Wt Readings from Last 1 Encounters:   01/05/22 (!) 361 lb 12.4 oz (164.1 kg)     Mental Status:  oriented and alert    IV Access:  - None    Nursing Mobility/ADLs:  Walking   Independent  Transfer  Independent  Bathing  Independent  Dressing  Independent  Toileting  Independent  Feeding  Independent  Med Admin  Independent  Med Delivery   none    Wound Care Documentation and Therapy:        Elimination:  Continence: Bowel: No  Bladder: No  Urinary Catheter: None   Colostomy/Ileostomy/Ileal Conduit: No       Date of Last BM: 1/4/2022    Intake/Output Summary (Last 24 hours) at 1/5/2022 1106  Last data filed at 1/5/2022 0600  Gross per 24 hour   Intake 849 ml   Output 1700 ml   Net -851 ml     I/O last 3 completed shifts: In: 849 [P.O.:30; I.V.:535; IV Piggyback:284]  Out: 1700 [Urine:1700]    Safety Concerns:     None    Impairments/Disabilities:      None    Nutrition Therapy:  Current Nutrition Therapy:   - Oral Diet:  General    Routes of Feeding: Oral  Liquids: No Restrictions  Daily Fluid Restriction: no  Last Modified Barium Swallow with Video (Video Swallowing Test): not done    Treatments at the Time of Hospital Discharge:   Respiratory Treatments:   Oxygen Therapy:  is on oxygen at 4 L/min per nasal cannula.   Ventilator:    - No ventilator support    Rehab Therapies: Physical Therapy  Weight Bearing Status/Restrictions: No weight bearing restirctions  Other Medical Equipment (for information only, NOT a DME order):  none  Other Treatments:     Patient's personal belongings (please select all that are sent with patient):  None    RN SIGNATURE:  Electronically signed by Elijah Burt RN on 1/5/22 at 1:20 PM EST    CASE MANAGEMENT/SOCIAL WORK SECTION    Inpatient Status Date: 1/2/2022    Readmission Risk Assessment Score:  Readmission Risk              Risk of Unplanned Readmission:  13           Discharging to Facility/ Agency   Name: Charlie Clink  PHONE; [phone]: [phone]     Name:  Liz  Phone:  [phone]  Fax:      [phone]     / signature: MAIKEL Murguia, CCM, RN  [facility]  [phone]     PHYSICIAN SECTION    Prognosis: Good    Condition at Discharge: Stable    Rehab Potential (if transferring to Rehab): Good    Recommended Labs or Other Treatments After Discharge: RN    Physician Certification: I certify the above information and transfer of Duane Khan  is necessary for the continuing treatment of the diagnosis listed and that he requires [address] for less 30 days.      Update Admission H&P: No change in H&P    PHYSICIAN SIGNATURE:  Electronically signed by MERCY Moncada CNP on 1/5/22 at 11:22 AM EST

## 2022-01-05 NOTE — CONSULTS
Pharmacy to check patient copays for Eliquis/Xarelto:    Unable to verify copay for DOACs as both require a prior authorization. Pharmacy will continue to follow the decision for anticoagulation and  the patient if appropriate.        Jacqlyn Lombard, PharmD, [facility]  Clinical Pharmacist  [id number] no...

## 2022-01-05 NOTE — PROGRESS NOTES
VASCULAR  PPD#1    Feels better subjectively. Up to restroom with some SOB - unable to tell if less than pre-TPA    VSS afeb  3L O2 NC with sats 94-97  Lungs crackles throughout  R groin without hematoma    LE venous duplex + popliteal DVT    A/P: S/P EKOS thrombolysis for PE   Clinically looks improved but some hypoxia persists likely related to COVID pneumonia. No further plans for intervention. Convert to po anticoagulation per medical service. Can be transferred to med-surg bed. DC chandler. Will need to wear knee high 20/30 mmHg compression stockings daily after DC for DVT. Pt instructed. Will see as needed. Please call for any further questions or problems.      Zoraida Runner

## 2022-01-05 NOTE — PROGRESS NOTES
01/05/22 1318   Resting (Room Air)   SpO2 91   Resting (On O2)   SpO2 94   O2 Device Nasal cannula   O2 Flow Rate (l/min) 3 l/min   During Walk (Room Air)   SpO2 85   Rate of Dyspnea 1   During Walk (On O2)   SpO2 92   O2 Device Nasal cannula   O2 Flow Rate (l/min) 4 l/min   Need Additional O2 Flow Rate Rows No   Rate of Dyspnea 1   Comments During the duration of walk with exertion pt sats on RA 85%, 3L pt sats were holding 91%, but more we walked sats dropped to 87%, increased to 4L pt sats remained 90-92% throughout rest of walk. Pt was SOB.    After Walk   SpO2 92   O2 Device Nasal cannula   O2 Flow Rate (l/min) 4 l/min   Rate of Dyspnea 1   Does the Patient Qualify for Home O2 Yes   Liter Flow at Rest 0   Liter Flow on Exertion 4   Does the Patient Need Portable Oxygen Tanks Yes

## 2022-01-05 NOTE — DISCHARGE SUMMARY
Hospital Medicine Discharge Summary    Patient ID: Duane [place]      Patient's PCP: Ruslan Burleson MD    Admit Date: 1/2/2022     Discharge Date: 1/5/2022      Admitting Physician: Kelly Amaro MD     Discharge Physician: MERCY Moncada - CNP     Discharge Diagnoses  Bilateral PE with cor pulmonale  Left leg DVT  Acute hypoxic respiratory failure  COVID-19 pneumonia  Hypertension  Poor IV access  Morbid obesity  Past medical history hypertension, hyperlipidemia, hypothyroidism    Hospital Course: Lorie Mclean a 63 y. o. male with h/o HTN , dyslipidemia presented with c/o dyspnea and congestion . Was tested positive for COVID  At a an outside facility late last month. He is found to be hypoxic with ambulation with sats dropping in high 80's. Does not wear O2 at baseline. Chest CT showed b/l Pulmonary embolism with cardiac strain . The pt remains hemodynamically stable. He has been started on Heparin   Admitted for further work-up and treatment. Bilateral PE with cor pulmonale  Left leg DVT  -Seen on CT PE imaging  -Vascular consulted, EKOS performed 1/4/2022  -Vascular recommends knee-high support hose 20/30 mmHg daily  -Echo showed RV strain   - lower extremity Doppler showed left lower extremity thrombosis. -Remains hemodynamically stable  -Pulmonology consult, Will need at least 4 months of anticoagulation for provoked PE in setting of COVID-19.  -Was on heparin drip, transition to Xarelto, prescription provided. Prior Auth has to be completed, this was initiated. Coupon for free 30-day supply of Xarelto provided.     Acute hypoxic respiratory failure  -Secondary to bilateral PEs and Covid  -Patient with  air hunger symptoms of dyspnea that worsens with conversation and minimal exertion  -RT performed home oxygen study. Requires 4 L nasal cannula O2 with exertion. Room air at rest.  Home O2 set up at discharge.    notified.     COVID-23 pneumonia  -Remdesivir not given due to testing positive for Covid +3 weeks ago  -Infectious disease consult given Covid test +3 weeks ago  -Treated with IV Decadron, transition to oral at discharge  -Prescriptions for albuterol and Symbicort inhalers provided  -Reports cough, schedule Tessalon Perles, Robitussin with codeine cough syrup-option given at discharge  -D-dimer and CRP 20    Hypertension  -Blood pressure 100s to 130s on losartan 50 mg daily. He normally takes losartan 100/hydrochlorothiazide 25. Told to hold that home medication for now. New prescription for losartan 50 mg daily given. Instructed to check blood pressure twice a day, and if systolic blood pressure greater than 140 for 2 days, then resume home losartan 100/25    Poor IV access  -PICC line had been placed to right upper arm. Removed at discharge. Morbid obesity  -encourage lifestyle changes     Has medical history hypertension, hyperlipidemia, hypothyroidism  -Continue home medications as ordered  -Patient does have chronic pain, for which he takes NSAIDs. Instructed not to take NSAIDs while he is on the blood thinner. Prescribed Percocet at discharge, for pain control.     Patient stated they felt improved and wanted to go home. Patient denied chest pain, worsening shortness of breath, palpitations, abdominal pain, nausea vomiting, diarrhea, dysuria, headache lightheadedness or dizziness. Appetite good. Voiding without difficulty. Reviewed plan of care with patient and wife on the phone, they verbalized understanding and agreement. Denied further questions or needs. Physical Exam Performed:     BP (!) 109/90   Pulse 95   Temp 98 °F (36.7 °C) (Temporal)   Resp 18   Ht 6' (1.829 m)   Wt (!) 361 lb 12.4 oz (164.1 kg)   SpO2 90%   BMI 49.07 kg/m²       General appearance:  No apparent distress, appears stated age and cooperative. HEENT:  Normal cephalic, atraumatic without obvious deformity.  Pupils equal, round, and reactive to light. Extra ocular muscles intact. Conjunctivae/corneas clear. Neck: Supple, with full range of motion. No jugular venous distention. Trachea midline. Respiratory: Diminished bilaterally   cardiovascular:  Regular rate and rhythm with normal S1/S2 without murmurs, rubs or gallops. Abdomen: Soft, non-tender, non-distended with normal bowel sounds. Musculoskeletal:  No clubbing, cyanosis or edema bilaterally. Full range of motion without deformity. Skin: Skin color, texture, turgor normal.  No rashes or lesions. Neurologic:  Neurovascularly intact without any focal sensory/motor deficits. Cranial nerves: II-XII intact, grossly non-focal.  Psychiatric:  Alert and oriented, thought content appropriate, normal insight  Capillary Refill: Brisk,< 3 seconds   Peripheral Pulses: +2 palpable, equal bilaterally       Labs: For convenience and continuity at follow-up the following most recent labs are provided:      CBC:    Lab Results   Component Value Date    WBC 8.0 01/05/2022    HGB 12.5 01/05/2022    HCT 36.9 01/05/2022     01/05/2022       Renal:    Lab Results   Component Value Date     01/05/2022    K 4.0 01/05/2022     01/05/2022    CO2 24 01/05/2022    BUN 24 01/05/2022    CREATININE 0.9 01/05/2022    CALCIUM 8.8 01/05/2022    PHOS 2.8 09/05/2014         Significant Diagnostic Studies    Radiology:   VL Extremity Venous Bilateral         XR CHEST PORTABLE   Final Result   Right-sided PICC terminating in the right atrium      Bilateral airspace opacities persist         CT CHEST PULMONARY EMBOLISM W CONTRAST   Final Result   Bilateral pulmonary emboli      Right ventricle dilated compared to the left suggesting right ventricular   heart strain      Findings were discussed with Tere Valentine at 5:16 pm on 1/2/2022.       RECOMMENDATIONS:   Unavailable         XR CHEST PORTABLE   Final Result   Bilateral airspace opacities suggesting COVID pneumonia                Consults: IP CONSULT TO VASCULAR SURGERY  IP CONSULT TO HOSPITALIST  IP CONSULT TO PHARMACY  IP CONSULT TO PULMONOLOGY  IP CONSULT TO INFECTIOUS DISEASES  IP CONSULT TO PHARMACY  IP CONSULT TO SOCIAL WORK  IP CONSULT TO HOME CARE NEEDS  IP CONSULT TO FINANCIAL COUNSELOR    Disposition: Home    Condition at Discharge: Stable    Discharge Instructions/Follow-up:      Follow up with pcp in 1 week    Will need to be on xarelto for 4 months  Prior authorization to be completed for prior authorization    Check blood pressure and heart rate twice a day, record, review with pcp. If systolic blood pressure (top number) is 140 or above for two days in a row, then resume lostartan/hctz 100/25mg. Wear Oxygen as directed    Wear Fuad hose to bilateral lower legs, on during day, off at night    Code Status:  Prior     Activity: activity as tolerated    Diet: regular diet      Discharge Medications:     Discharge Medication List as of 1/5/2022  3:30 PM           Details   rivaroxaban 15 & 20 MG Starter Pack Take as directed on package. , Disp-1 each, R-0Normal      albuterol sulfate  (90 Base) MCG/ACT inhaler Inhale 2 puffs into the lungs 4 times daily, Disp-18 g, R-0Normal      budesonide-formoterol (SYMBICORT) 160-4.5 MCG/ACT AERO Inhale 2 puffs into the lungs 2 times daily, Disp-10.2 g, R-0Normal      ipratropium (ATROVENT HFA) 17 MCG/ACT inhaler Inhale 2 puffs into the lungs 4 times daily, Disp-1 each, R-0Normal      losartan (COZAAR) 50 MG tablet Take 1 tablet by mouth daily, Disp-30 tablet, R-0Normal      dexamethasone (DECADRON) 2 MG tablet Take 3 tabs daily x 2 days, then 2 tabs daily x 2 days, then 1 tab daily x 2 days, then 1/2 tab daily x 2 days, Disp-13 tablet, R-0Normal      benzonatate (TESSALON) 200 MG capsule Take 1 capsule by mouth 3 times daily for 7 days, Disp-21 capsule, R-0Normal      guaiFENesin-codeine (GUAIFENESIN AC) 100-10 MG/5ML liquid Take 5 mLs by mouth every 4 hours as needed for Cough for up to 7 days. Do not take with oxycodone, Disp-200 mL, R-0Print      oxyCODONE-acetaminophen (PERCOCET) 5-325 MG per tablet Take 1 tablet by mouth every 8 hours as needed for Pain for up to 3 days. , Disp-9 tablet, R-0Print              Details   amLODIPine (NORVASC) 10 MG tablet Take 10 mg by mouth dailyHistorical Med      ondansetron (ZOFRAN ODT) 4 MG disintegrating tablet Take 1 tablet by mouth every 8 hours as needed for Nausea or Vomiting, Disp-12 tablet, R-2Normal      levothyroxine (SYNTHROID) 75 MCG tablet 1 po qam, Disp-90 tablet, R-3NO FURTHER REFILLS PT NEEDS APPT ASAPNormal      aspirin 81 MG tablet Take 81 mg by mouth dailyHistorical Med             Time Spent on discharge is more than 30 minutes in the examination, evaluation, counseling and review of medications and discharge plan. Signed: MERCY Mcdaniel - CNP   1/9/2022    The patient was seen and examined on day of discharge and this discharge summary is in conjunction with any daily progress note from day of discharge. Thank you Gloria Tracey MD for the opportunity to be involved in this patient's care. If you have any questions or concerns please feel free to contact me at [phone]. NOTE:  This report was transcribed using voice recognition software. Every effort was made to ensure accuracy; however, inadvertent computerized transcription errors may be present.

## 2022-01-05 NOTE — CARE COORDINATION
[facility]et received a referral to this patient for home 02 @ 4 lpm w exertion via nc. Home 02 has been arranged for delivery. Pt is aware to call S[facility]et [phone] to initiate setup. Portable tank has been delivered to the hospital floor for discharge. Thank you for the referral.  Electronically signed by Claudeen Cliff on 1/5/2022 at 2:40 PM  Cell ph# [phone]    NOTE: After 5:00 pm, Weekends, Holidays: Call Jessica/[facility] [employer] at [phone] to coordinate delivery of home medical equipment.

## 2022-01-06 ENCOUNTER — CARE COORDINATION (OUTPATIENT)
Dept: CASE MANAGEMENT | Age: 62
End: 2022-01-06

## 2022-01-06 DIAGNOSIS — J12.82 PNEUMONIA DUE TO COVID-19 VIRUS: Primary | ICD-10-CM

## 2022-01-06 DIAGNOSIS — U07.1 PNEUMONIA DUE TO COVID-19 VIRUS: Primary | ICD-10-CM

## 2022-01-06 LAB
BLOOD CULTURE, ROUTINE: NORMAL
CULTURE, BLOOD 2: NORMAL

## 2022-01-06 NOTE — CARE COORDINATION
[place] [facility] Transitions Initial Follow Up Call:    (Patient is calling Dr. Peña Balloon office today to schedule hospital follow up appointment.)    Patient is doing \"well\", is a little SOB with ambulation, wearing O2, saturation is 93-97%. He does have dry cough, denies fever. Discussed discharge instructions and reviewed medications, 1111F completed. CTN will continue with outreach follow up calls. Call within 2 business days of discharge: Yes    Patient: Trent Fernandez Patient : 1960   MRN: [id number]  Reason for Admission: COVID+ PNA; bilateral PEs with right and left EKOS thrombolysis catheter  Discharge Date: 22 RARS: Readmission Risk Score: 8.7 ( )      Last Discharge [facility]       Complaint Diagnosis Description Type Department Provider    22 Shortness of Breath Acute respiratory failure with hypoxia ([facility] [address] . .. ED to Hosp-Admission (Discharged) (ADMITTED) Carri Vila MD; Reshma KING... Spoke with: Birder Layer      Patient contacted regarding COVID-19 diagnosis and pulse oximeter ordered at discharge. Discussed COVID-19 related testing which was available at this time. Test results were positive. Patient informed of results, if available? Patient is aware that he was hospitalized with COVID. Care Transition Nurse contacted the patient by telephone to perform post discharge assessment. Call within 2 business days of discharge: Yes. Verified name and  with patient as identifiers. Provided introduction to self, and explanation of the CTN/ACM role, and reason for call due to risk factors for infection and/or exposure to COVID-19. Symptoms reviewed with patient who verbalized the following symptoms: cough, shortness of breath, no new symptoms and no worsening symptoms. Due to no new or worsening symptoms encounter was not routed to provider for escalation. Discussed follow-up appointments.  If no appointment was previously scheduled, appointment scheduling offered: No.  [facility] follow up appointment(s): No future appointments. Non-[facility] follow up appointment(s):     Non-face-to-face services provided:  Obtained and reviewed discharge summary and/or continuity of care documents     Advance Care Planning:   Does patient have an Advance Directive:  not on file; education provided. Educated patient about risk for severe COVID-19 due to risk factors according to CDC guidelines. CTN reviewed discharge instructions, medical action plan and red flag symptoms with the patient who verbalized understanding. Discussed COVID vaccination status: No. Education provided on COVID-19 vaccination as appropriate. Discussed exposure protocols and quarantine with CDC Guidelines. Patient was given an opportunity to verbalize any questions and concerns and agrees to contact CTN or health care provider for questions related to their healthcare. Reviewed and educated patient on any new and changed medications related to discharge diagnosis     Was patient discharged with a pulse oximeter? Yes Discussed and confirmed pulse oximeter discharge instructions and when to notify provider or seek emergency care. CTN provided contact information. Plan for follow-up call in 5-7 days based on severity of symptoms and risk factors.         Non-face-to-face services provided:  Obtained and reviewed discharge summary and/or continuity of care documents    Care Transitions 24 Hour Call    Do you have any ongoing symptoms?: No  Do you have a copy of your discharge instructions?: Yes  Do you have all of your prescriptions and are they filled?: Yes  Have you been contacted by a [address]?: No  Have you scheduled your follow up appointment?: No  Were you discharged with any Home Care or Post Acute Services: Yes  Post Acute Services: [address] you have support at home?: Partner/Spouse/SO  Do you feel like you have everything you need to keep you well at home?: Yes  Are you an active caregiver in your home?: No  Care Transitions Interventions         Follow Up  No future appointments.     David Ramon RN

## 2022-01-06 NOTE — PROGRESS NOTES
Physician Progress Note      PATIENT:               Zoe Mitchell  [facility] #:                  [id number]  :                       1960  ADMIT DATE:       2022 2:39 PM  [address] DATE:        2022 4:24 PM  RESPONDING  PROVIDER #:        Marcelo Dong          QUERY TEXT:    Pt admitted with Bilateral PE with Cor Pulmonale . Pt noted to have Covid   positive test with Covid Pneumonia, elevated D-Dimer and CRP on admission. If   possible, please document in progress notes and discharge summary the   relationship, if any, between Covid-19 Infection and Bilateral PE with Cor   Pulmonale. The medical record reflects the following:  Risk Factors: Covid PNA, ARF w/hypoxia, Bilateral PE with Cor Pulmonale,   Morbid Obesity, HTN, HLD  Clinical Indicators: Labs on admission 22 CRP 69.9  D-Dimer 4257 21   Covid PCR -Positive  Pt. admitted for Bilateral PE with Cor Pulmonale  Per IM   note 22 \"Will need at least 4 months for provoked PE in setting of   COVID-19. \"   Per pt. tested positive 3weeks prior to admission with home Covid   test.  Treatment: Vascular/Pulmonary/ID consults, iv Heparin gtt, iv Decadron, EKOS   performed, 2D Echo, CT Chest/PE, CxR, monitor labs  Options provided:  -- Bilateral PE with Cor Pulmonale due to Covid-19 Infection  -- Bilateral PE with Cor Pulmonale unrelated to Covid-19 Infection  -- Other - I will add my own diagnosis  -- Disagree - Not applicable / Not valid  -- Disagree - Clinically unable to determine / Unknown  -- Refer to Clinical Documentation Reviewer    PROVIDER RESPONSE TEXT:    This patient has Bilateral PE with Cor Pulmonale due to Covid-19 Infection.     Query created by: Yadi Leal on 2022 2:21 PM      Electronically signed by:  Marcelo Dong 2022 7:00 AM

## 2022-01-10 RX ORDER — BENZONATATE 200 MG/1
200 CAPSULE ORAL 3 TIMES DAILY
Qty: 21 CAPSULE | Refills: 0 | Status: SHIPPED | OUTPATIENT
Start: 2022-01-10 | End: 2022-01-13 | Stop reason: CLARIF

## 2022-01-10 NOTE — TELEPHONE ENCOUNTER
Medication:   Requested Prescriptions      No prescriptions requested or ordered in this encounter        Last Filled:  01/05/2021    Patient Phone Number: [phone] (home) [phone] (work)    Last appt: 6/29/2021   Next appt: 1/10/2022    Last OARRS:   RX Monitoring 1/5/2022   Acute Pain Prescriptions Prescription exceeds daily limit for a specific reason. See comments or note.

## 2022-01-10 NOTE — TELEPHONE ENCOUNTER
guaiFENesin-codeine (GUAIFENESIN AC) 100-10 MG/5ML liquid      Pt states that he does not want to take this medication because he does not feel like it is helping him at all, and he also states he does not want to take it because it has codeine in it. But he would like an alternative sent to his pharmacy. Please advise. ..

## 2022-01-10 NOTE — TELEPHONE ENCOUNTER
Medication and Quantity requested: benzonatate (TESSALON) 200 MG capsule         Last Visit  6/29/21    Pharmacy and phone number updated in [facility]:  Yes    [place]

## 2022-01-11 RX ORDER — BENZONATATE 200 MG/1
200 CAPSULE ORAL 3 TIMES DAILY PRN
Qty: 30 CAPSULE | Refills: 0 | Status: SHIPPED | OUTPATIENT
Start: 2022-01-11 | End: 2022-01-18

## 2022-01-13 ENCOUNTER — VIRTUAL VISIT (OUTPATIENT)
Dept: FAMILY MEDICINE CLINIC | Age: 62
End: 2022-01-13
Payer: COMMERCIAL

## 2022-01-13 ENCOUNTER — TELEPHONE (OUTPATIENT)
Dept: VASCULAR SURGERY | Age: 62
End: 2022-01-13

## 2022-01-13 ENCOUNTER — TELEPHONE (OUTPATIENT)
Dept: FAMILY MEDICINE CLINIC | Age: 62
End: 2022-01-13

## 2022-01-13 DIAGNOSIS — U07.1 COVID: Primary | ICD-10-CM

## 2022-01-13 PROCEDURE — 99495 TRANSJ CARE MGMT MOD F2F 14D: CPT | Performed by: FAMILY MEDICINE

## 2022-01-13 PROCEDURE — 1111F DSCHRG MED/CURRENT MED MERGE: CPT | Performed by: FAMILY MEDICINE

## 2022-01-13 NOTE — TELEPHONE ENCOUNTER
[place] [facility] Transitions Initial Follow Up Call    Outreach made within 2 business days of discharge: Yes    Patient: Hyun Swenson Patient : 1960   MRN: [id number]  Reason for Admission: There are no discharge diagnoses documented for the most recent discharge. Discharge Date: 22       Spoke with:Paramjit Garcia department/facility: [facility]    TCM Interactive Patient Contact:  Was patient able to fill all prescriptions: Yes  Was patient instructed to bring all medications to the follow-up visit: No: VV follow up patien has a list of medications ready for appt  Is patient taking all medications as directed in the discharge summary?  Yes  Does patient understand their discharge instructions: Yes  Does patient have questions or concerns that need addressed prior to 7-14 day follow up office visit: yes - 2022    Scheduled appointment with PCP within 7-14 days    Follow Up  Future Appointments   Date Time Provider Kogn Rogers   2022 11:00 AM Seth Haq MD [facility]       Zuleyma Tomlin RN

## 2022-01-13 NOTE — TELEPHONE ENCOUNTER
Celeste Veloz calling from [facility] wanting a verbal ok to get custom compression zipper stockings in 20-30. Ok given verbally to do custom socks.     Celeste Veloz [phone]

## 2022-01-13 NOTE — PROGRESS NOTES
into the lungs 4 times daily     amLODIPine 10 MG tablet  Commonly known as: NORVASC     aspirin 81 MG tablet     * benzonatate 200 MG capsule  Commonly known as: TESSALON  Take 1 capsule by mouth 3 times daily for 7 days     * benzonatate 200 MG capsule  Commonly known as: TESSALON  Take 1 capsule by mouth 3 times daily as needed for Cough     budesonide-formoterol 160-4.5 MCG/ACT Aero  Commonly known as: SYMBICORT  Inhale 2 puffs into the lungs 2 times daily     dexamethasone 2 MG tablet  Commonly known as: DECADRON  Take 3 tabs daily x 2 days, then 2 tabs daily x 2 days, then 1 tab daily x 2 days, then 1/2 tab daily x 2 days     ipratropium 17 MCG/ACT inhaler  Commonly known as: ATROVENT HFA  Inhale 2 puffs into the lungs 4 times daily     levothyroxine 75 MCG tablet  Commonly known as: SYNTHROID  1 po qam     losartan 50 MG tablet  Commonly known as: COZAAR  Take 1 tablet by mouth daily     ondansetron 4 MG disintegrating tablet  Commonly known as: Zofran ODT  Take 1 tablet by mouth every 8 hours as needed for Nausea or Vomiting     rivaroxaban 15 & 20 MG Starter Pack  Take as directed on package. * This list has 2 medication(s) that are the same as other medications prescribed for you. Read the directions carefully, and ask your doctor or other care provider to review them with you. Medications marked \"taking\" at this time  Outpatient Medications Marked as Taking for the 1/13/22 encounter (Virtual Visit) with Shayna Walden MD   Medication Sig Dispense Refill    benzonatate (TESSALON) 200 MG capsule Take 1 capsule by mouth 3 times daily as needed for Cough 30 capsule 0    benzonatate (TESSALON) 200 MG capsule Take 1 capsule by mouth 3 times daily for 7 days 21 capsule 0    rivaroxaban 15 & 20 MG Starter Pack Take as directed on package.  1 each 0    albuterol sulfate  (90 Base) MCG/ACT inhaler Inhale 2 puffs into the lungs 4 times daily 18 g 0    budesonide-formoterol (SYMBICORT) 160-4.5 MCG/ACT AERO Inhale 2 puffs into the lungs 2 times daily 10.2 g 0    ipratropium (ATROVENT HFA) 17 MCG/ACT inhaler Inhale 2 puffs into the lungs 4 times daily 1 each 0    losartan (COZAAR) 50 MG tablet Take 1 tablet by mouth daily 30 tablet 0    dexamethasone (DECADRON) 2 MG tablet Take 3 tabs daily x 2 days, then 2 tabs daily x 2 days, then 1 tab daily x 2 days, then 1/2 tab daily x 2 days 13 tablet 0    amLODIPine (NORVASC) 10 MG tablet Take 10 mg by mouth daily      ondansetron (ZOFRAN ODT) 4 MG disintegrating tablet Take 1 tablet by mouth every 8 hours as needed for Nausea or Vomiting 12 tablet 2    levothyroxine (SYNTHROID) 75 MCG tablet 1 po qam 90 tablet 3    aspirin 81 MG tablet Take 81 mg by mouth daily          Medications patient taking as of now reconciled against medications ordered at time of hospital discharge: Yes    Chief Complaint   Patient presents with    Follow-Up from Hospital       History of Present illness - Follow up of Hospital diagnosis(es): Positive COVID test with worsening shortness of breath and cough  Sent to the emergency room and found to have bilateral pulmonary emboli DVT in left leg  Echo showed right ventricular strain      Inpatient course: Discharge summary reviewed- see chart. Underwent EKOS which was a vascular procedure by Dr. Jaylen Rojo to dissolve clot through the groin approach. Had anticoagulation started home O2 arranged and was sent home    Interval history/Current status: Sent home on home O2 and he continues to have cough. Initially 2 L was not enough he is now up to 4. His able been able to reduce his oxygen down to 3-1/2 L and can ambulate without his sats dropping below 92 visiting nurse checks on him and listen to his lungs and they were clear and his blood pressure was 125/75    A comprehensive review of systems was negative except for what was noted in the HPI. There were no vitals filed for this visit.   There is no height or weight on file to calculate BMI. Wt Readings from Last 3 Encounters:   01/05/22 (!) 361 lb 12.4 oz (164.1 kg)   06/29/21 (!) 384 lb (174.2 kg)   02/08/21 (!) 403 lb (182.8 kg)     BP Readings from Last 3 Encounters:   01/05/22 (!) 109/90   06/29/21 138/85   04/23/20 138/80        Physical Exam: BP per visiting nurse 125 or 75 O2 sats per patient self-report 94-96 at rest    General Appearance: alert and oriented to person, place and time, well-developed and well-nourished, in no acute distress  Obese. No respiratory distress at rest on 3 and half liters of oxygen still has a dry cough  Pulmonary-no audible wheezes    Assessment/Plan:  1.  COVID  Bilateral pneumonia  - MI DISCHARGE MEDS RECONCILED W/ CURRENT OUTPATIENT MED LIST  Continue inhaler 4 times daily, oxygen, finished Decadron today  2 morbid obesity  3 hypertension-stable  4 hypothyroidism-stable  Of DVT and bilateral PE status post thrombolysis treatment  Continue Xarelto for 4 months    Medical Decision Making: high complexity  Repeat video visit in 2 to 3 weeks

## 2022-01-14 ENCOUNTER — CARE COORDINATION (OUTPATIENT)
Dept: CASE MANAGEMENT | Age: 62
End: 2022-01-14

## 2022-01-14 NOTE — CARE COORDINATION
Follow Up Call     Reason for Admission: COVID+ PNA; bilateral PEs with right and left EKOS thrombolysis catheter    Challenges to be reviewed by the provider   Additional needs identified to be addressed with provider: No  none                 Encounter was not routed to provider for escalation. Method of communication with provider:  none. Contacted the patient by telephone to follow up after hospital visit. Status: improved  Interventions to address identified needs: Obtained and reviewed discharge summary and/or continuity of care documents    [facility] follow up appointment(s):   Future Appointments   Date Time Provider Kong Sue   1/31/2022 10:15 AM MD PRASHANT Mcdaniel     [facility] follow up appointment(s): na   Follow up appointment completed? Yes and virtual.    Provided contact information for future needs. Plan for follow-up call in 5-7 days based on severity of symptoms and risk factors.   Plan for next call: symptom check    Luke Rose RN

## 2022-01-20 ENCOUNTER — CARE COORDINATION (OUTPATIENT)
Dept: CASE MANAGEMENT | Age: 62
End: 2022-01-20

## 2022-01-20 NOTE — CARE COORDINATION
[place] [facility] Transitions Follow Up Call    2022    Patient: Aurora Carrington  Patient : 1960   MRN: [id number]  Reason for Admission: COVID+ PNA; bilateral PEs with right and left EKOS thrombolysis catheter  Discharge Date: 22 RARS: Readmission Risk Score: 8.7 ( )         Spoke with: [place] Transitions Subsequent and Final Call    Subsequent and Final Calls  Care Transitions Interventions  Other Interventions: Follow Up: Patient reports that he is doing better, denies fever, SOB, does continue to have cough. He denies any questions or concerns at this time and will follow up with PCP 22. CTN will continue with outreach follow up calls.     Future Appointments   Date Time Provider Kong Rogers   2022 10:15 AM MD Jaspreet Landers RN  \

## 2022-01-25 ENCOUNTER — TELEPHONE (OUTPATIENT)
Dept: FAMILY MEDICINE CLINIC | Age: 62
End: 2022-01-25

## 2022-01-25 NOTE — TELEPHONE ENCOUNTER
If you are slowly improving, go ahead and stop the Atrovent inhaler.   Based finish out the Symbicort inhaler and when is done stop it is well may continue to use albuterol as needed

## 2022-01-25 NOTE — TELEPHONE ENCOUNTER
Medication:   Requested Prescriptions     Pending Prescriptions Disp Refills    rivaroxaban 15 & 20 MG Starter Pack 1 each 0     Sig: Take as directed on package.  ipratropium (ATROVENT HFA) 17 MCG/ACT inhaler 1 each 0     Sig: Inhale 2 puffs into the lungs 4 times daily        Last Filled:  1/5/2022, 1, 0  1/5/2022, 1, 0    Patient Phone Number: [phone] (home) [phone] (work)    Last appt: 1/13/2022   Next appt: 1/25/2022    Last OARRS:   RX Monitoring 1/5/2022   Acute Pain Prescriptions Prescription exceeds daily limit for a specific reason. See comments or note.

## 2022-01-25 NOTE — TELEPHONE ENCOUNTER
Medication and Quantity requested: rivaroxaban 20 MG     And    ipratropium (ATROVENT HFA) 17 MCG/ACT inhaler        Last Visit  1/13/22    Pharmacy and phone number updated in EPIC:  Yes  [facility]

## 2022-01-26 ENCOUNTER — TELEPHONE (OUTPATIENT)
Dept: FAMILY MEDICINE CLINIC | Age: 62
End: 2022-01-26

## 2022-01-26 NOTE — TELEPHONE ENCOUNTER
He can stop the Atrovent inhaler.   And he should be on rivaroxaban 20 mg daily so I corrected the prescription

## 2022-01-27 ENCOUNTER — CARE COORDINATION (OUTPATIENT)
Dept: CASE MANAGEMENT | Age: 62
End: 2022-01-27

## 2022-01-27 NOTE — CARE COORDINATION
Follow Up Call  COVID+ PNA; bilateral PEs with right and left EKOS thrombolysis catheter    Sleeping without O2, sats in the 90s. Doing much better. Reviewed inhalers with pt and has 7 days left of Xarelto. Challenges to be reviewed by the provider   Additional needs identified to be addressed with provider: No  none                 Encounter was not routed to provider for escalation. Method of communication with provider:  none. Contacted the patient by telephone to follow up after hospital visit. Status: significantly improved  Interventions to address identified needs: Obtained and reviewed discharge summary and/or continuity of care documents  [facility] follow up appointment(s):   Future Appointments   Date Time Provider Kong Rogers   1/31/2022 10:15 AM MD PRASHANT Keller     [facility] follow up appointment(s): na   Follow up appointment completed? Yes. Provided contact information for future needs. Plan for follow-up call in 5-7 days based on severity of symptoms and risk factors. Plan for next call: symptom management-O2 sats still maintaining in the 90s  follow up appointment-any changes? medication management-did he get the Xarelto approved and questions answered about inhalers.     Branden Fairchild RN

## 2022-01-28 NOTE — TELEPHONE ENCOUNTER
Submitted PA for Xarelto 20MG tablets  Via CM Key: [id number] STATUS: \"Approved; Review Type:Prior Auth; Coverage Start Date:01/01/2022; Coverage End Date:01/28/2023    If this requires a response please respond to the pool ( P [id number] [facility]). Thank you please advise patient.

## 2022-01-31 ENCOUNTER — VIRTUAL VISIT (OUTPATIENT)
Dept: FAMILY MEDICINE CLINIC | Age: 62
End: 2022-01-31
Payer: COMMERCIAL

## 2022-01-31 DIAGNOSIS — E66.01 MORBID OBESITY DUE TO EXCESS CALORIES (HCC): ICD-10-CM

## 2022-01-31 DIAGNOSIS — U07.1 PNEUMONIA DUE TO COVID-19 VIRUS: ICD-10-CM

## 2022-01-31 DIAGNOSIS — I26.09 ACUTE PULMONARY EMBOLISM WITH ACUTE COR PULMONALE, UNSPECIFIED PULMONARY EMBOLISM TYPE (HCC): Primary | ICD-10-CM

## 2022-01-31 DIAGNOSIS — I10 ESSENTIAL HYPERTENSION: ICD-10-CM

## 2022-01-31 DIAGNOSIS — E78.00 PURE HYPERCHOLESTEROLEMIA: ICD-10-CM

## 2022-01-31 DIAGNOSIS — J12.82 PNEUMONIA DUE TO COVID-19 VIRUS: ICD-10-CM

## 2022-01-31 PROCEDURE — 99214 OFFICE O/P EST MOD 30 MIN: CPT | Performed by: FAMILY MEDICINE

## 2022-01-31 NOTE — PROGRESS NOTES
Brynn Bosworth is a 63 y.o. male. Brynn Bosworth, was evaluated through a synchronous (real-time) audio-video encounter. The patient (or guardian if applicable) is aware that this is a billable service, which includes applicable co-pays. This Virtual Visit was conducted with patient's (and/or legal guardian's) consent. The visit was conducted pursuant to the emergency declaration under the [address] [place] [address], [address] [place] [address] and the [employer] Act. Patient identification was verified, and a caregiver was present when appropriate. The patient was located at home in a state where the provider was licensed to provide care. Total time spent for this encounter: 25    --Izabel Sage MD on 1/31/2022 at 10:49 AM    An electronic signature was used to authenticate this note. HPI:  Home recovering from [place] and a PE  Feels like he is 9095% recovered  Has not actually used his oxygen in the last week and his sats are 95 to 97%  He has no dyspnea on exertion walking through stores and walking in the house  He has a minimal cough  Visiting nurse has been out for 5 times blood pressure is 117/78 through up to 137/80  He has stopped using his Atrovent. He is switching over from Xarelto 15 twice daily to 20 mg daily  He is run out of his benzonatate  He is hoping to get his left hip replaced by my Dr. Clemens Slot for lacerated his right hip sometime later this summer  He like to return to works slowly and ease into it  No nosebleeds blood in the stool or blood in the urine  Mild cough persist  Wt Readings from Last 3 Encounters:   01/05/22 (!) 361 lb 12.4 oz (164.1 kg)   06/29/21 (!) 384 lb (174.2 kg)   02/08/21 (!) 403 lb (182.8 kg)     Meds, vitamins and allergies reviewed with Patient    ROS:  Gen: No fever  HEENT: No cold symptoms, sore throat. CV:  Denies chest pain or palpitations.   Pulm:  Denies shortness of breath, cough. Abd:  Denies abdominal pain, nausea and vomiting. Skin: no rash    No Known Allergies    Prior to Visit Medications    Medication Sig Taking? Authorizing Provider   rivaroxaban (XARELTO) 20 MG TABS tablet Take 1 tablet by mouth daily (with breakfast) Yes Maricarmen Middleton MD   rivaroxaban 15 & 20 MG Starter Pack Take as directed on package. Yes Anastasia Meckel Dumouchelle, APRN - CNP   albuterol sulfate  (90 Base) MCG/ACT inhaler Inhale 2 puffs into the lungs 4 times daily Yes MERCY Mariscal CNP   budesonide-formoterol (SYMBICORT) 160-4.5 MCG/ACT AERO Inhale 2 puffs into the lungs 2 times daily Yes MERCY Mariscal CNP   ipratropium (ATROVENT HFA) 17 MCG/ACT inhaler Inhale 2 puffs into the lungs 4 times daily Yes MERCY Mariscal CNP   losartan (COZAAR) 50 MG tablet Take 1 tablet by mouth daily Yes Anastasia Meckel Dumouchelle, APRN - CNP   amLODIPine (NORVASC) 10 MG tablet Take 10 mg by mouth daily Yes Historical Provider, MD   ondansetron (ZOFRAN ODT) 4 MG disintegrating tablet Take 1 tablet by mouth every 8 hours as needed for Nausea or Vomiting Yes Maricarmen Middleton MD   levothyroxine (SYNTHROID) 75 MCG tablet 1 po qam Yes Maricarmen Middleton MD   aspirin 81 MG tablet Take 81 mg by mouth daily Yes Historical Provider, MD       OBJECTIVE:  There were no vitals taken for this visit. GEN:  in NAD weight 3 49 down from 361  Sats 95 to 97%  Blood pressure 117-137/78  Lungs no audible wheezes heard  NEURO: Alert and oriented ×3    ASSESSMENT/PLAN:  Encounter Diagnoses   Name Primary?     Acute pulmonary embolism with acute cor pulmonale, unspecified pulmonary embolism type (HCC) Yes    Essential hypertension     Pure hypercholesterolemia     Pneumonia due to COVID-19 virus     Morbid obesity due to excess calories (HCC)    Hypothyroidism      Stop Atrovent  Symbicort down to 1 puff twice daily  Make albuterol as needed rather than 4 times daily  Stop oxygen  May stop the

## 2022-02-02 ENCOUNTER — CARE COORDINATION (OUTPATIENT)
Dept: CASE MANAGEMENT | Age: 62
End: 2022-02-02

## 2022-02-02 NOTE — CARE COORDINATION
symptoms. Denied fever, chills, N/V and any difficulty breathing at this time. Denied difficulty with urination, BMs or appetite. Denied chest pain and SOB. Pt had a f/u on 01/31/22 and it went well. Stop Atrovent  Symbicort down to 1 puff twice daily  Make albuterol as needed rather than 4 times daily  Stop oxygen  [facility], and pt to follow-up in 3 months. Advised wife to immediately report any worsening symptoms to the PCP. Patient verbalized understanding and agreed. Final call as pt is stable and CT period has ended. Dru Ryan LPN, [facility]  PH: [phone]            Follow-up in 3 months            Care Transitions Subsequent and Final Call    Schedule Follow Up Appointment with PCP: Completed  Subsequent and Final Calls  Do you have any ongoing symptoms?: No  Have your medications changed?: Yes  Patient Reports: See note  Do you have any questions related to your medications?: No  Do you currently have any active services?: Yes  Are you currently active with any services?: Home Health, Other  Do you have any needs or concerns that I can assist you with?: No  Identified Barriers: None  Care Transitions Interventions   Home Care Waiver: Completed        DME Assistance: Completed   Other Interventions: Follow Up  No future appointments.     Dru Ryan LPN

## 2022-02-07 NOTE — TELEPHONE ENCOUNTER
Medication and Quantity requested: rivaroxaban (XARELTO) 20 MG TABS tablets-QTY.  90 day supply with 3 refills         Last Visit  1/31/22    Pharmacy and phone number updated in EPIC:  yes  [facility]

## 2022-02-07 NOTE — TELEPHONE ENCOUNTER
M  Medication:   Requested Prescriptions     Pending Prescriptions Disp Refills    rivaroxaban (XARELTO) 20 MG TABS tablet 90 tablet 1     Sig: Take 1 tablet by mouth daily (with breakfast)        Last Filled:    90 x 1 RF 1/25/22  Patient Phone Number: [phone] (home) [phone] (work)    Last appt: 1/31/2022   Next appt: Visit date not found    Last OARRS:   RX Monitoring 1/5/2022   Acute Pain Prescriptions Prescription exceeds daily limit for a specific reason. See comments or note.

## 2022-03-18 DIAGNOSIS — E03.8 SUBCLINICAL HYPOTHYROIDISM: ICD-10-CM

## 2022-03-18 RX ORDER — LEVOTHYROXINE SODIUM 0.07 MG/1
TABLET ORAL
Qty: 90 TABLET | Refills: 3 | Status: SHIPPED | OUTPATIENT
Start: 2022-03-18

## 2022-03-18 NOTE — TELEPHONE ENCOUNTER
Medication:   Requested Prescriptions     Pending Prescriptions Disp Refills    levothyroxine (SYNTHROID) 75 MCG tablet 90 tablet 3     Si po qam       Last Filled:  2021, 90, 3    Patient Phone Number: [phone] (home) [phone] (work)    Last appt: 2022 romero boles  Next appt: Visit date not found    Last Thyroid:   Lab Results   Component Value Date    TSH 1.70 2021    T4FREE 1.2 10/21/2016    C3GZVJA 9.2 2011

## 2022-08-22 RX ORDER — AMLODIPINE BESYLATE 10 MG/1
TABLET ORAL
Qty: 90 TABLET | Refills: 1 | Status: SHIPPED | OUTPATIENT
Start: 2022-08-22

## 2022-08-22 NOTE — TELEPHONE ENCOUNTER
Medication:   Requested Prescriptions     Pending Prescriptions Disp Refills    amLODIPine (NORVASC) 10 MG tablet [Pharmacy Med Name: AMLODIPINE BESYLATE TABS 10MG] 90 tablet 3     Sig: TAKE 1 TABLET DAILY       Last Filled: no date      Patient Phone Number: [phone] (home) [phone] (work)    Last appt: 1/31/2022   Next appt: Visit date not found    Lab Results   Component Value Date     01/05/2022    K 4.0 01/05/2022     01/05/2022    CO2 24 01/05/2022    BUN 24 (H) 01/05/2022    CREATININE 0.9 01/05/2022    GLUCOSE 131 (H) 01/05/2022    CALCIUM 8.8 01/05/2022    PROT 7.0 01/02/2022    LABALBU 3.2 (L) 01/02/2022    BILITOT 0.7 01/02/2022    ALKPHOS 52 01/02/2022    AST 30 01/02/2022    ALT 43 (H) 01/02/2022    LABGLOM >60 01/05/2022    GFRAA >60 01/05/2022    AGRATIO 0.8 (L) 01/02/2022    GLOB 2.6 06/29/2021

## 2022-11-21 RX ORDER — LOSARTAN POTASSIUM AND HYDROCHLOROTHIAZIDE 25; 100 MG/1; MG/1
TABLET ORAL
Qty: 90 TABLET | Refills: 0 | Status: SHIPPED | OUTPATIENT
Start: 2022-11-21

## 2022-12-29 ENCOUNTER — TELEPHONE (OUTPATIENT)
Dept: ADMINISTRATIVE | Age: 62
End: 2022-12-29

## 2022-12-29 NOTE — TELEPHONE ENCOUNTER
Submitted PA for Ivan  [facility]  Key: [id number] STATUS: \"PA IS NOT REQUIRED AND IS COVERED. \"    If this requires a response please respond to the pool. [address]). Please advise patient thank you.

## 2023-02-21 RX ORDER — AMLODIPINE BESYLATE 10 MG/1
TABLET ORAL
Qty: 90 TABLET | Refills: 0 | Status: SHIPPED | OUTPATIENT
Start: 2023-02-21

## 2023-03-13 DIAGNOSIS — E03.8 SUBCLINICAL HYPOTHYROIDISM: ICD-10-CM

## 2023-03-13 RX ORDER — LEVOTHYROXINE SODIUM 0.07 MG/1
TABLET ORAL
Qty: 90 TABLET | Refills: 0 | Status: SHIPPED | OUTPATIENT
Start: 2023-03-13

## 2023-05-19 RX ORDER — AMLODIPINE BESYLATE 10 MG/1
TABLET ORAL
Qty: 30 TABLET | Refills: 0 | Status: SHIPPED | OUTPATIENT
Start: 2023-05-19

## 2023-05-19 NOTE — TELEPHONE ENCOUNTER
Lov 1/31/22  Lrf 90 0 2/21/23  Lab Results   Component Value Date     01/05/2022    K 4.0 01/05/2022     01/05/2022    CO2 24 01/05/2022    BUN 24 (H) 01/05/2022    CREATININE 0.9 01/05/2022    GLUCOSE 131 (H) 01/05/2022    CALCIUM 8.8 01/05/2022    PROT 7.0 01/02/2022    LABALBU 3.2 (L) 01/02/2022    BILITOT 0.7 01/02/2022    ALKPHOS 52 01/02/2022    AST 30 01/02/2022    ALT 43 (H) 01/02/2022    LABGLOM >60 01/05/2022    GFRAA >60 01/05/2022    AGRATIO 0.8 (L) 01/02/2022    GLOB 2.6 06/29/2021

## 2023-06-05 ASSESSMENT — PATIENT HEALTH QUESTIONNAIRE - PHQ9
SUM OF ALL RESPONSES TO PHQ QUESTIONS 1-9: 0
1. LITTLE INTEREST OR PLEASURE IN DOING THINGS: NOT AT ALL
2. FEELING DOWN, DEPRESSED OR HOPELESS: 0
SUM OF ALL RESPONSES TO PHQ QUESTIONS 1-9: 0
1. LITTLE INTEREST OR PLEASURE IN DOING THINGS: 0
SUM OF ALL RESPONSES TO PHQ9 QUESTIONS 1 & 2: 0
SUM OF ALL RESPONSES TO PHQ QUESTIONS 1-9: 0
SUM OF ALL RESPONSES TO PHQ QUESTIONS 1-9: 0
SUM OF ALL RESPONSES TO PHQ9 QUESTIONS 1 & 2: 0
2. FEELING DOWN, DEPRESSED OR HOPELESS: NOT AT ALL

## 2023-06-08 ENCOUNTER — OFFICE VISIT (OUTPATIENT)
Dept: FAMILY MEDICINE CLINIC | Age: 63
End: 2023-06-08
Payer: COMMERCIAL

## 2023-06-08 VITALS
DIASTOLIC BLOOD PRESSURE: 82 MMHG | BODY MASS INDEX: 41.75 KG/M2 | WEIGHT: 315 LBS | HEART RATE: 76 BPM | OXYGEN SATURATION: 98 % | SYSTOLIC BLOOD PRESSURE: 131 MMHG | HEIGHT: 73 IN

## 2023-06-08 DIAGNOSIS — Z12.83 SKIN CANCER SCREENING: ICD-10-CM

## 2023-06-08 DIAGNOSIS — Z00.00 ANNUAL PHYSICAL EXAM: ICD-10-CM

## 2023-06-08 DIAGNOSIS — E03.8 SUBCLINICAL HYPOTHYROIDISM: ICD-10-CM

## 2023-06-08 DIAGNOSIS — R79.89 LOW TESTOSTERONE: ICD-10-CM

## 2023-06-08 DIAGNOSIS — I10 ESSENTIAL HYPERTENSION: ICD-10-CM

## 2023-06-08 DIAGNOSIS — U07.1 PNEUMONIA DUE TO COVID-19 VIRUS: ICD-10-CM

## 2023-06-08 DIAGNOSIS — M54.50 CHRONIC LEFT-SIDED LOW BACK PAIN WITHOUT SCIATICA: ICD-10-CM

## 2023-06-08 DIAGNOSIS — Z00.00 ANNUAL PHYSICAL EXAM: Primary | ICD-10-CM

## 2023-06-08 DIAGNOSIS — Z00.00 WELL ADULT EXAM: ICD-10-CM

## 2023-06-08 DIAGNOSIS — E78.00 PURE HYPERCHOLESTEROLEMIA: ICD-10-CM

## 2023-06-08 DIAGNOSIS — R73.03 PREDIABETES: ICD-10-CM

## 2023-06-08 DIAGNOSIS — E66.01 MORBID OBESITY DUE TO EXCESS CALORIES (HCC): ICD-10-CM

## 2023-06-08 DIAGNOSIS — J12.82 PNEUMONIA DUE TO COVID-19 VIRUS: ICD-10-CM

## 2023-06-08 DIAGNOSIS — G89.29 CHRONIC LEFT-SIDED LOW BACK PAIN WITHOUT SCIATICA: ICD-10-CM

## 2023-06-08 DIAGNOSIS — G47.33 OSA (OBSTRUCTIVE SLEEP APNEA): ICD-10-CM

## 2023-06-08 DIAGNOSIS — I26.09 ACUTE PULMONARY EMBOLISM WITH ACUTE COR PULMONALE, UNSPECIFIED PULMONARY EMBOLISM TYPE (HCC): ICD-10-CM

## 2023-06-08 LAB
ALBUMIN SERPL-MCNC: 4.8 G/DL (ref 3.4–5)
ALBUMIN/GLOB SERPL: 1.8 {RATIO} (ref 1.1–2.2)
ALP SERPL-CCNC: 69 U/L (ref 40–129)
ALT SERPL-CCNC: 40 U/L (ref 10–40)
ANION GAP SERPL CALCULATED.3IONS-SCNC: 12 MMOL/L (ref 3–16)
AST SERPL-CCNC: 25 U/L (ref 15–37)
BILIRUB SERPL-MCNC: 0.8 MG/DL (ref 0–1)
BILIRUBIN, POC: NORMAL
BLOOD URINE, POC: NORMAL
BUN SERPL-MCNC: 20 MG/DL (ref 7–20)
CALCIUM SERPL-MCNC: 9.7 MG/DL (ref 8.3–10.6)
CHLORIDE SERPL-SCNC: 99 MMOL/L (ref 99–110)
CHOLEST SERPL-MCNC: 204 MG/DL (ref 0–199)
CLARITY, POC: CLEAR
CO2 SERPL-SCNC: 28 MMOL/L (ref 21–32)
COLOR, POC: YELLOW
CREAT SERPL-MCNC: 1.1 MG/DL (ref 0.8–1.3)
DEPRECATED RDW RBC AUTO: 13 % (ref 12.4–15.4)
GFR SERPLBLD CREATININE-BSD FMLA CKD-EPI: >60 ML/MIN/{1.73_M2}
GLUCOSE SERPL-MCNC: 141 MG/DL (ref 70–99)
GLUCOSE URINE, POC: NORMAL
HCT VFR BLD AUTO: 48 % (ref 40.5–52.5)
HDLC SERPL-MCNC: 45 MG/DL (ref 40–60)
HGB BLD-MCNC: 16.6 G/DL (ref 13.5–17.5)
KETONES, POC: NORMAL
LDLC SERPL CALC-MCNC: 115 MG/DL
LEUKOCYTE EST, POC: NORMAL
MCH RBC QN AUTO: 31.4 PG (ref 26–34)
MCHC RBC AUTO-ENTMCNC: 34.5 G/DL (ref 31–36)
MCV RBC AUTO: 90.9 FL (ref 80–100)
NITRITE, POC: NORMAL
PH, POC: 5
PLATELET # BLD AUTO: 189 K/UL (ref 135–450)
PMV BLD AUTO: 7.8 FL (ref 5–10.5)
POTASSIUM SERPL-SCNC: 4.8 MMOL/L (ref 3.5–5.1)
PROT SERPL-MCNC: 7.4 G/DL (ref 6.4–8.2)
PROTEIN, POC: 30
RBC # BLD AUTO: 5.28 M/UL (ref 4.2–5.9)
SODIUM SERPL-SCNC: 139 MMOL/L (ref 136–145)
SPECIFIC GRAVITY, POC: 1.03
TRIGL SERPL-MCNC: 222 MG/DL (ref 0–150)
UROBILINOGEN, POC: 0.2
VLDLC SERPL CALC-MCNC: 44 MG/DL
WBC # BLD AUTO: 6.4 K/UL (ref 4–11)

## 2023-06-08 PROCEDURE — 3079F DIAST BP 80-89 MM HG: CPT | Performed by: FAMILY MEDICINE

## 2023-06-08 PROCEDURE — 81002 URINALYSIS NONAUTO W/O SCOPE: CPT | Performed by: FAMILY MEDICINE

## 2023-06-08 PROCEDURE — 3075F SYST BP GE 130 - 139MM HG: CPT | Performed by: FAMILY MEDICINE

## 2023-06-08 PROCEDURE — 99396 PREV VISIT EST AGE 40-64: CPT | Performed by: FAMILY MEDICINE

## 2023-06-08 RX ORDER — SILDENAFIL 100 MG/1
100 TABLET, FILM COATED ORAL DAILY PRN
Qty: 30 TABLET | Refills: 1 | Status: SHIPPED | OUTPATIENT
Start: 2023-06-08

## 2023-06-08 RX ORDER — HYDROCODONE BITARTRATE AND ACETAMINOPHEN 7.5; 325 MG/1; MG/1
1 TABLET ORAL EVERY 6 HOURS PRN
Qty: 28 TABLET | Refills: 0 | Status: SHIPPED | OUTPATIENT
Start: 2023-06-08 | End: 2023-06-15

## 2023-06-08 RX ORDER — SILDENAFIL 100 MG/1
100 TABLET, FILM COATED ORAL DAILY PRN
Qty: 30 TABLET | Refills: 1 | Status: SHIPPED | OUTPATIENT
Start: 2023-06-08 | End: 2023-06-08 | Stop reason: SDUPTHER

## 2023-06-08 SDOH — ECONOMIC STABILITY: INCOME INSECURITY: HOW HARD IS IT FOR YOU TO PAY FOR THE VERY BASICS LIKE FOOD, HOUSING, MEDICAL CARE, AND HEATING?: NOT HARD AT ALL

## 2023-06-08 SDOH — ECONOMIC STABILITY: FOOD INSECURITY: WITHIN THE PAST 12 MONTHS, THE FOOD YOU BOUGHT JUST DIDN'T LAST AND YOU DIDN'T HAVE MONEY TO GET MORE.: NEVER TRUE

## 2023-06-08 SDOH — ECONOMIC STABILITY: FOOD INSECURITY: WITHIN THE PAST 12 MONTHS, YOU WORRIED THAT YOUR FOOD WOULD RUN OUT BEFORE YOU GOT MONEY TO BUY MORE.: NEVER TRUE

## 2023-06-08 SDOH — ECONOMIC STABILITY: HOUSING INSECURITY
IN THE LAST 12 MONTHS, WAS THERE A TIME WHEN YOU DID NOT HAVE A STEADY PLACE TO SLEEP OR SLEPT IN A SHELTER (INCLUDING NOW)?: NO

## 2023-06-08 NOTE — PROGRESS NOTES
regions. Musculoskeletal: Spine ROM normal. Muscular strength intact. Peripheral pulses: radial=4/4, dorsalis pedis=4/4,  Neuro: Cranial nerves intact, Gait normal. Reflexes normal and symmetric, with no pathologic reflex noted. No focal weakness. Normal sensation to light touch. Genitalia: Deferred  Rectal: Deferred    Results for orders placed or performed during the hospital encounter of 01/02/22   Culture, Blood 2    Specimen: Blood   Result Value Ref Range    Culture, Blood 2 No Growth after 4 days of incubation. Culture, Blood 1    Specimen: Blood   Result Value Ref Range    Blood Culture, Routine No Growth after 4 days of incubation.     CBC Auto Differential   Result Value Ref Range    WBC 7.6 4.0 - 11.0 K/uL    RBC 4.46 4.20 - 5.90 M/uL    Hemoglobin 13.6 13.5 - 17.5 g/dL    Hematocrit 39.2 (L) 40.5 - 52.5 %    MCV 88.0 80.0 - 100.0 fL    MCH 30.4 26.0 - 34.0 pg    MCHC 34.6 31.0 - 36.0 g/dL    RDW 12.6 12.4 - 15.4 %    Platelets 459 276 - 503 K/uL    MPV 6.7 5.0 - 10.5 fL    Neutrophils % 79.7 %    Lymphocytes % 10.5 %    Monocytes % 8.3 %    Eosinophils % 0.8 %    Basophils % 0.7 %    Neutrophils Absolute 6.0 1.7 - 7.7 K/uL    Lymphocytes Absolute 0.8 (L) 1.0 - 5.1 K/uL    Monocytes Absolute 0.6 0.0 - 1.3 K/uL    Eosinophils Absolute 0.1 0.0 - 0.6 K/uL    Basophils Absolute 0.1 0.0 - 0.2 K/uL   Comprehensive Metabolic Panel   Result Value Ref Range    Sodium 135 (L) 136 - 145 mmol/L    Potassium 4.0 3.5 - 5.1 mmol/L    Chloride 98 (L) 99 - 110 mmol/L    CO2 26 21 - 32 mmol/L    Anion Gap 11 3 - 16    Glucose 155 (H) 70 - 99 mg/dL    BUN 20 7 - 20 mg/dL    Creatinine 1.0 0.9 - 1.3 mg/dL    GFR Non-African American >60 >60    GFR African American >60 >60    Calcium 9.5 8.3 - 10.6 mg/dL    Total Protein 7.0 6.4 - 8.2 g/dL    Albumin 3.2 (L) 3.4 - 5.0 g/dL    Albumin/Globulin Ratio 0.8 (L) 1.1 - 2.2    Total Bilirubin 0.7 0.0 - 1.0 mg/dL    Alkaline Phosphatase 52 40 - 129 U/L    ALT 43 (H) 10 - 40 U/L

## 2023-06-09 LAB
EST. AVERAGE GLUCOSE BLD GHB EST-MCNC: 145.6 MG/DL
HBA1C MFR BLD: 6.7 %
PSA SERPL DL<=0.01 NG/ML-MCNC: 0.43 NG/ML (ref 0–4)
TSH SERPL DL<=0.005 MIU/L-ACNC: 2.88 UIU/ML (ref 0.27–4.2)

## 2023-06-11 PROBLEM — E11.21 TYPE 2 DIABETES MELLITUS WITH DIABETIC NEPHROPATHY, WITHOUT LONG-TERM CURRENT USE OF INSULIN (HCC): Status: ACTIVE | Noted: 2023-06-11

## 2023-06-11 LAB — TESTOST SERPL-MCNC: 148 NG/DL (ref 220–1000)

## 2023-06-11 RX ORDER — METFORMIN HYDROCHLORIDE 500 MG/1
500 TABLET, EXTENDED RELEASE ORAL
Qty: 90 TABLET | Refills: 1 | Status: SHIPPED | OUTPATIENT
Start: 2023-06-11

## 2023-06-19 ENCOUNTER — PATIENT MESSAGE (OUTPATIENT)
Dept: FAMILY MEDICINE CLINIC | Age: 63
End: 2023-06-19

## 2023-06-19 DIAGNOSIS — G89.29 CHRONIC LEFT-SIDED LOW BACK PAIN WITHOUT SCIATICA: Primary | ICD-10-CM

## 2023-06-19 DIAGNOSIS — M54.50 CHRONIC BILATERAL LOW BACK PAIN, UNSPECIFIED WHETHER SCIATICA PRESENT: Primary | ICD-10-CM

## 2023-06-19 DIAGNOSIS — G89.29 CHRONIC BILATERAL LOW BACK PAIN, UNSPECIFIED WHETHER SCIATICA PRESENT: Primary | ICD-10-CM

## 2023-06-19 DIAGNOSIS — M54.50 CHRONIC LEFT-SIDED LOW BACK PAIN WITHOUT SCIATICA: Primary | ICD-10-CM

## 2023-06-20 DIAGNOSIS — G89.29 CHRONIC LEFT-SIDED LOW BACK PAIN WITH LEFT-SIDED SCIATICA: Primary | ICD-10-CM

## 2023-06-20 DIAGNOSIS — M54.42 CHRONIC LEFT-SIDED LOW BACK PAIN WITH LEFT-SIDED SCIATICA: Primary | ICD-10-CM

## 2023-06-20 RX ORDER — PREDNISONE 10 MG/1
TABLET ORAL
Qty: 30 TABLET | Refills: 0 | Status: SHIPPED | OUTPATIENT
Start: 2023-06-20

## 2023-06-20 NOTE — TELEPHONE ENCOUNTER
Roseanna, Processor 6/20/2023 9:53 AM EDT    Dr Margarita Giles Asa is out 3-4 weeks. Is there somebody else you can refer me to that I can get it now. I can't take another month of this pain.

## 2023-07-03 ENCOUNTER — HOSPITAL ENCOUNTER (INPATIENT)
Age: 63
LOS: 3 days | Discharge: HOME | End: 2023-07-07
Attending: SURGERY | Admitting: SURGERY
Payer: COMMERCIAL

## 2023-07-03 ENCOUNTER — APPOINTMENT (OUTPATIENT)
Dept: CT IMAGING | Age: 63
End: 2023-07-03
Payer: COMMERCIAL

## 2023-07-03 DIAGNOSIS — K56.7 ILEUS (HCC): Primary | ICD-10-CM

## 2023-07-03 DIAGNOSIS — K59.00 CONSTIPATION, UNSPECIFIED CONSTIPATION TYPE: ICD-10-CM

## 2023-07-03 LAB
ALBUMIN SERPL-MCNC: 4.7 G/DL (ref 3.4–5)
ALBUMIN/GLOB SERPL: 1.4 {RATIO} (ref 1.1–2.2)
ALP SERPL-CCNC: 76 U/L (ref 40–129)
ALT SERPL-CCNC: 37 U/L (ref 10–40)
ANION GAP SERPL CALCULATED.3IONS-SCNC: 17 MMOL/L (ref 3–16)
AST SERPL-CCNC: 19 U/L (ref 15–37)
BASOPHILS # BLD: 0 K/UL (ref 0–0.2)
BASOPHILS NFR BLD: 0.2 %
BILIRUB SERPL-MCNC: 1.7 MG/DL (ref 0–1)
BUN SERPL-MCNC: 33 MG/DL (ref 7–20)
CALCIUM SERPL-MCNC: 9.8 MG/DL (ref 8.3–10.6)
CHLORIDE SERPL-SCNC: 90 MMOL/L (ref 99–110)
CO2 SERPL-SCNC: 27 MMOL/L (ref 21–32)
CREAT SERPL-MCNC: 1.4 MG/DL (ref 0.8–1.3)
DEPRECATED RDW RBC AUTO: 12.7 % (ref 12.4–15.4)
EOSINOPHIL # BLD: 0.1 K/UL (ref 0–0.6)
EOSINOPHIL NFR BLD: 0.4 %
GFR SERPLBLD CREATININE-BSD FMLA CKD-EPI: 57 ML/MIN/{1.73_M2}
GLUCOSE SERPL-MCNC: 198 MG/DL (ref 70–99)
HCT VFR BLD AUTO: 52 % (ref 40.5–52.5)
HGB BLD-MCNC: 18 G/DL (ref 13.5–17.5)
LACTATE BLDV-SCNC: 3.6 MMOL/L (ref 0.4–2)
LIPASE SERPL-CCNC: 34 U/L (ref 13–60)
LYMPHOCYTES # BLD: 0.6 K/UL (ref 1–5.1)
LYMPHOCYTES NFR BLD: 4.3 %
MCH RBC QN AUTO: 30.5 PG (ref 26–34)
MCHC RBC AUTO-ENTMCNC: 34.6 G/DL (ref 31–36)
MCV RBC AUTO: 88.3 FL (ref 80–100)
MONOCYTES # BLD: 1.1 K/UL (ref 0–1.3)
MONOCYTES NFR BLD: 7.6 %
NEUTROPHILS # BLD: 13.1 K/UL (ref 1.7–7.7)
NEUTROPHILS NFR BLD: 87.5 %
PLATELET # BLD AUTO: 278 K/UL (ref 135–450)
PMV BLD AUTO: 7.3 FL (ref 5–10.5)
POTASSIUM SERPL-SCNC: 4.1 MMOL/L (ref 3.5–5.1)
PROT SERPL-MCNC: 8 G/DL (ref 6.4–8.2)
RBC # BLD AUTO: 5.89 M/UL (ref 4.2–5.9)
SODIUM SERPL-SCNC: 134 MMOL/L (ref 136–145)
WBC # BLD AUTO: 14.9 K/UL (ref 4–11)

## 2023-07-03 PROCEDURE — 96361 HYDRATE IV INFUSION ADD-ON: CPT

## 2023-07-03 PROCEDURE — 36415 COLL VENOUS BLD VENIPUNCTURE: CPT

## 2023-07-03 PROCEDURE — 80053 COMPREHEN METABOLIC PANEL: CPT

## 2023-07-03 PROCEDURE — 6360000004 HC RX CONTRAST MEDICATION: Performed by: PHYSICIAN ASSISTANT

## 2023-07-03 PROCEDURE — 2580000003 HC RX 258: Performed by: PHYSICIAN ASSISTANT

## 2023-07-03 PROCEDURE — 99285 EMERGENCY DEPT VISIT HI MDM: CPT

## 2023-07-03 PROCEDURE — 85025 COMPLETE CBC W/AUTO DIFF WBC: CPT

## 2023-07-03 PROCEDURE — 83605 ASSAY OF LACTIC ACID: CPT

## 2023-07-03 PROCEDURE — 74177 CT ABD & PELVIS W/CONTRAST: CPT

## 2023-07-03 PROCEDURE — 83690 ASSAY OF LIPASE: CPT

## 2023-07-03 PROCEDURE — 96360 HYDRATION IV INFUSION INIT: CPT

## 2023-07-03 RX ORDER — 0.9 % SODIUM CHLORIDE 0.9 %
1000 INTRAVENOUS SOLUTION INTRAVENOUS ONCE
Status: COMPLETED | OUTPATIENT
Start: 2023-07-03 | End: 2023-07-03

## 2023-07-03 RX ORDER — 0.9 % SODIUM CHLORIDE 0.9 %
1000 INTRAVENOUS SOLUTION INTRAVENOUS ONCE
Status: DISCONTINUED | OUTPATIENT
Start: 2023-07-03 | End: 2023-07-04

## 2023-07-03 RX ADMIN — SODIUM CHLORIDE 1000 ML: 9 INJECTION, SOLUTION INTRAVENOUS at 21:55

## 2023-07-03 RX ADMIN — IOHEXOL 75 ML: 350 INJECTION, SOLUTION INTRAVENOUS at 22:08

## 2023-07-03 ASSESSMENT — ENCOUNTER SYMPTOMS
DIARRHEA: 0
NAUSEA: 0
VOMITING: 0
BLOOD IN STOOL: 0
CHEST TIGHTNESS: 0
BACK PAIN: 0
ABDOMINAL DISTENTION: 1
COLOR CHANGE: 0
RESPIRATORY NEGATIVE: 1
COUGH: 0
ABDOMINAL PAIN: 1
ANAL BLEEDING: 0
CONSTIPATION: 1
SHORTNESS OF BREATH: 0
RECTAL PAIN: 0

## 2023-07-03 ASSESSMENT — PAIN DESCRIPTION - LOCATION: LOCATION: ABDOMEN

## 2023-07-03 ASSESSMENT — PAIN - FUNCTIONAL ASSESSMENT: PAIN_FUNCTIONAL_ASSESSMENT: 0-10

## 2023-07-03 ASSESSMENT — PAIN SCALES - GENERAL: PAINLEVEL_OUTOF10: 3

## 2023-07-04 ENCOUNTER — APPOINTMENT (OUTPATIENT)
Dept: GENERAL RADIOLOGY | Age: 63
End: 2023-07-04
Payer: COMMERCIAL

## 2023-07-04 PROBLEM — I25.10 CORONARY ARTERY CALCIFICATION: Status: ACTIVE | Noted: 2023-07-04

## 2023-07-04 PROBLEM — K56.7 ILEUS (HCC): Status: ACTIVE | Noted: 2023-07-04

## 2023-07-04 PROBLEM — I25.84 CORONARY ARTERY CALCIFICATION: Status: ACTIVE | Noted: 2023-07-04

## 2023-07-04 LAB
ALBUMIN SERPL-MCNC: 3.8 G/DL (ref 3.4–5)
ANION GAP SERPL CALCULATED.3IONS-SCNC: 11 MMOL/L (ref 3–16)
BACTERIA URNS QL MICRO: NORMAL /HPF
BASOPHILS # BLD: 0.1 K/UL (ref 0–0.2)
BASOPHILS NFR BLD: 0.7 %
BILIRUB UR QL STRIP.AUTO: ABNORMAL
BUN SERPL-MCNC: 30 MG/DL (ref 7–20)
CALCIUM SERPL-MCNC: 8.7 MG/DL (ref 8.3–10.6)
CHLORIDE SERPL-SCNC: 94 MMOL/L (ref 99–110)
CLARITY UR: CLEAR
CO2 SERPL-SCNC: 29 MMOL/L (ref 21–32)
COLOR UR: ABNORMAL
CREAT SERPL-MCNC: 1 MG/DL (ref 0.8–1.3)
DEPRECATED RDW RBC AUTO: 12.7 % (ref 12.4–15.4)
EOSINOPHIL # BLD: 0.1 K/UL (ref 0–0.6)
EOSINOPHIL NFR BLD: 1.1 %
EPI CELLS #/AREA URNS AUTO: 1 /HPF (ref 0–5)
GFR SERPLBLD CREATININE-BSD FMLA CKD-EPI: >60 ML/MIN/{1.73_M2}
GLUCOSE BLD-MCNC: 116 MG/DL (ref 70–99)
GLUCOSE BLD-MCNC: 128 MG/DL (ref 70–99)
GLUCOSE BLD-MCNC: 131 MG/DL (ref 70–99)
GLUCOSE BLD-MCNC: 139 MG/DL (ref 70–99)
GLUCOSE BLD-MCNC: 185 MG/DL (ref 70–99)
GLUCOSE SERPL-MCNC: 143 MG/DL (ref 70–99)
GLUCOSE UR STRIP.AUTO-MCNC: NEGATIVE MG/DL
HCT VFR BLD AUTO: 45 % (ref 40.5–52.5)
HGB BLD-MCNC: 15.5 G/DL (ref 13.5–17.5)
HGB UR QL STRIP.AUTO: NEGATIVE
HYALINE CASTS #/AREA URNS AUTO: 8 /LPF (ref 0–8)
KETONES UR STRIP.AUTO-MCNC: ABNORMAL MG/DL
LACTATE BLDV-SCNC: 1.9 MMOL/L (ref 0.4–2)
LACTATE BLDV-SCNC: 3.7 MMOL/L (ref 0.4–2)
LEUKOCYTE ESTERASE UR QL STRIP.AUTO: NEGATIVE
LYMPHOCYTES # BLD: 1 K/UL (ref 1–5.1)
LYMPHOCYTES NFR BLD: 10.6 %
MCH RBC QN AUTO: 30.2 PG (ref 26–34)
MCHC RBC AUTO-ENTMCNC: 34.5 G/DL (ref 31–36)
MCV RBC AUTO: 87.5 FL (ref 80–100)
MONOCYTES # BLD: 1 K/UL (ref 0–1.3)
MONOCYTES NFR BLD: 10.4 %
NEUTROPHILS # BLD: 7.2 K/UL (ref 1.7–7.7)
NEUTROPHILS NFR BLD: 77.2 %
NITRITE UR QL STRIP.AUTO: NEGATIVE
PERFORMED ON: ABNORMAL
PH UR STRIP.AUTO: 5 [PH] (ref 5–8)
PHOSPHATE SERPL-MCNC: 3.7 MG/DL (ref 2.5–4.9)
PLATELET # BLD AUTO: 245 K/UL (ref 135–450)
PMV BLD AUTO: 7.3 FL (ref 5–10.5)
POTASSIUM SERPL-SCNC: 3.5 MMOL/L (ref 3.5–5.1)
PROT UR STRIP.AUTO-MCNC: 100 MG/DL
RBC # BLD AUTO: 5.14 M/UL (ref 4.2–5.9)
RBC CLUMPS #/AREA URNS AUTO: 0 /HPF (ref 0–4)
SODIUM SERPL-SCNC: 134 MMOL/L (ref 136–145)
SP GR UR STRIP.AUTO: >=1.03 (ref 1–1.03)
UA COMPLETE W REFLEX CULTURE PNL UR: ABNORMAL
UA DIPSTICK W REFLEX MICRO PNL UR: YES
URN SPEC COLLECT METH UR: ABNORMAL
UROBILINOGEN UR STRIP-ACNC: 1 E.U./DL
WBC # BLD AUTO: 9.4 K/UL (ref 4–11)
WBC #/AREA URNS AUTO: 0 /HPF (ref 0–5)

## 2023-07-04 PROCEDURE — 99222 1ST HOSP IP/OBS MODERATE 55: CPT | Performed by: SURGERY

## 2023-07-04 PROCEDURE — 6360000002 HC RX W HCPCS: Performed by: SURGERY

## 2023-07-04 PROCEDURE — 81001 URINALYSIS AUTO W/SCOPE: CPT

## 2023-07-04 PROCEDURE — 6370000000 HC RX 637 (ALT 250 FOR IP): Performed by: NURSE PRACTITIONER

## 2023-07-04 PROCEDURE — 36415 COLL VENOUS BLD VENIPUNCTURE: CPT

## 2023-07-04 PROCEDURE — 85025 COMPLETE CBC W/AUTO DIFF WBC: CPT

## 2023-07-04 PROCEDURE — 74018 RADEX ABDOMEN 1 VIEW: CPT

## 2023-07-04 PROCEDURE — 6370000000 HC RX 637 (ALT 250 FOR IP): Performed by: INTERNAL MEDICINE

## 2023-07-04 PROCEDURE — 0D9670Z DRAINAGE OF STOMACH WITH DRAINAGE DEVICE, VIA NATURAL OR ARTIFICIAL OPENING: ICD-10-PCS | Performed by: FAMILY MEDICINE

## 2023-07-04 PROCEDURE — 2580000003 HC RX 258: Performed by: SURGERY

## 2023-07-04 PROCEDURE — 2500000003 HC RX 250 WO HCPCS: Performed by: NURSE PRACTITIONER

## 2023-07-04 PROCEDURE — 83605 ASSAY OF LACTIC ACID: CPT

## 2023-07-04 PROCEDURE — 1200000000 HC SEMI PRIVATE

## 2023-07-04 PROCEDURE — 80069 RENAL FUNCTION PANEL: CPT

## 2023-07-04 RX ORDER — SODIUM CHLORIDE 0.9 % (FLUSH) 0.9 %
5-40 SYRINGE (ML) INJECTION EVERY 12 HOURS SCHEDULED
Status: DISCONTINUED | OUTPATIENT
Start: 2023-07-04 | End: 2023-07-07 | Stop reason: HOSPADM

## 2023-07-04 RX ORDER — ASPIRIN 81 MG/1
81 TABLET, CHEWABLE ORAL DAILY
Status: DISCONTINUED | OUTPATIENT
Start: 2023-07-04 | End: 2023-07-04

## 2023-07-04 RX ORDER — INSULIN LISPRO 100 [IU]/ML
0-4 INJECTION, SOLUTION INTRAVENOUS; SUBCUTANEOUS EVERY 6 HOURS
Status: DISCONTINUED | OUTPATIENT
Start: 2023-07-04 | End: 2023-07-07 | Stop reason: HOSPADM

## 2023-07-04 RX ORDER — SODIUM CHLORIDE 9 MG/ML
INJECTION, SOLUTION INTRAVENOUS PRN
Status: DISCONTINUED | OUTPATIENT
Start: 2023-07-04 | End: 2023-07-07 | Stop reason: HOSPADM

## 2023-07-04 RX ORDER — INSULIN LISPRO 100 [IU]/ML
0-4 INJECTION, SOLUTION INTRAVENOUS; SUBCUTANEOUS NIGHTLY
Status: DISCONTINUED | OUTPATIENT
Start: 2023-07-04 | End: 2023-07-07 | Stop reason: HOSPADM

## 2023-07-04 RX ORDER — LEVOTHYROXINE SODIUM 0.07 MG/1
75 TABLET ORAL DAILY
Status: DISCONTINUED | OUTPATIENT
Start: 2023-07-04 | End: 2023-07-07 | Stop reason: HOSPADM

## 2023-07-04 RX ORDER — DEXTROSE, SODIUM CHLORIDE, AND POTASSIUM CHLORIDE 5; .45; .3 G/100ML; G/100ML; G/100ML
INJECTION INTRAVENOUS CONTINUOUS
Status: DISCONTINUED | OUTPATIENT
Start: 2023-07-04 | End: 2023-07-07

## 2023-07-04 RX ORDER — ENOXAPARIN SODIUM 100 MG/ML
40 INJECTION SUBCUTANEOUS 2 TIMES DAILY
Status: DISCONTINUED | OUTPATIENT
Start: 2023-07-04 | End: 2023-07-07 | Stop reason: HOSPADM

## 2023-07-04 RX ORDER — PREDNISONE 5 MG/1
10 TABLET ORAL DAILY
Status: DISCONTINUED | OUTPATIENT
Start: 2023-07-04 | End: 2023-07-04

## 2023-07-04 RX ORDER — AMLODIPINE BESYLATE 5 MG/1
10 TABLET ORAL DAILY
Status: DISCONTINUED | OUTPATIENT
Start: 2023-07-04 | End: 2023-07-05

## 2023-07-04 RX ORDER — ACETAMINOPHEN 325 MG/1
650 TABLET ORAL EVERY 6 HOURS PRN
Status: DISCONTINUED | OUTPATIENT
Start: 2023-07-04 | End: 2023-07-07 | Stop reason: HOSPADM

## 2023-07-04 RX ORDER — ONDANSETRON 2 MG/ML
4 INJECTION INTRAMUSCULAR; INTRAVENOUS EVERY 6 HOURS PRN
Status: DISCONTINUED | OUTPATIENT
Start: 2023-07-04 | End: 2023-07-07 | Stop reason: HOSPADM

## 2023-07-04 RX ORDER — SODIUM CHLORIDE 0.9 % (FLUSH) 0.9 %
5-40 SYRINGE (ML) INJECTION PRN
Status: DISCONTINUED | OUTPATIENT
Start: 2023-07-04 | End: 2023-07-07 | Stop reason: HOSPADM

## 2023-07-04 RX ORDER — ONDANSETRON 4 MG/1
4 TABLET, ORALLY DISINTEGRATING ORAL EVERY 8 HOURS PRN
Status: DISCONTINUED | OUTPATIENT
Start: 2023-07-04 | End: 2023-07-07 | Stop reason: HOSPADM

## 2023-07-04 RX ORDER — ASPIRIN 81 MG/1
81 TABLET, CHEWABLE ORAL DAILY
Status: DISCONTINUED | OUTPATIENT
Start: 2023-07-04 | End: 2023-07-07 | Stop reason: HOSPADM

## 2023-07-04 RX ORDER — LOSARTAN POTASSIUM 100 MG/1
100 TABLET ORAL DAILY
Status: DISCONTINUED | OUTPATIENT
Start: 2023-07-04 | End: 2023-07-07 | Stop reason: HOSPADM

## 2023-07-04 RX ORDER — PREDNISONE 5 MG/1
10 TABLET ORAL
Status: COMPLETED | OUTPATIENT
Start: 2023-07-04 | End: 2023-07-06

## 2023-07-04 RX ORDER — GLUCAGON 1 MG/ML
1 KIT INJECTION PRN
Status: DISCONTINUED | OUTPATIENT
Start: 2023-07-04 | End: 2023-07-07 | Stop reason: HOSPADM

## 2023-07-04 RX ORDER — ACETAMINOPHEN 650 MG/1
650 SUPPOSITORY RECTAL EVERY 6 HOURS PRN
Status: DISCONTINUED | OUTPATIENT
Start: 2023-07-04 | End: 2023-07-07 | Stop reason: HOSPADM

## 2023-07-04 RX ORDER — HEPARIN SODIUM 5000 [USP'U]/ML
5000 INJECTION, SOLUTION INTRAVENOUS; SUBCUTANEOUS EVERY 8 HOURS SCHEDULED
Status: DISCONTINUED | OUTPATIENT
Start: 2023-07-04 | End: 2023-07-04

## 2023-07-04 RX ORDER — LEVOTHYROXINE SODIUM 0.07 MG/1
75 TABLET ORAL DAILY
Status: DISCONTINUED | OUTPATIENT
Start: 2023-07-04 | End: 2023-07-04

## 2023-07-04 RX ORDER — DEXTROSE MONOHYDRATE 100 MG/ML
INJECTION, SOLUTION INTRAVENOUS CONTINUOUS PRN
Status: DISCONTINUED | OUTPATIENT
Start: 2023-07-04 | End: 2023-07-07 | Stop reason: HOSPADM

## 2023-07-04 RX ORDER — POLYETHYLENE GLYCOL 3350 17 G/17G
17 POWDER, FOR SOLUTION ORAL DAILY PRN
Status: DISCONTINUED | OUTPATIENT
Start: 2023-07-04 | End: 2023-07-07 | Stop reason: HOSPADM

## 2023-07-04 RX ADMIN — LEVOTHYROXINE SODIUM 75 MCG: 0.07 TABLET ORAL at 11:46

## 2023-07-04 RX ADMIN — POTASSIUM CHLORIDE, DEXTROSE MONOHYDRATE AND SODIUM CHLORIDE: 300; 5; 450 INJECTION, SOLUTION INTRAVENOUS at 23:26

## 2023-07-04 RX ADMIN — Medication 10 ML: at 08:37

## 2023-07-04 RX ADMIN — POTASSIUM CHLORIDE, DEXTROSE MONOHYDRATE AND SODIUM CHLORIDE: 300; 5; 450 INJECTION, SOLUTION INTRAVENOUS at 12:57

## 2023-07-04 RX ADMIN — ENOXAPARIN SODIUM 40 MG: 100 INJECTION SUBCUTANEOUS at 20:36

## 2023-07-04 RX ADMIN — ENOXAPARIN SODIUM 40 MG: 100 INJECTION SUBCUTANEOUS at 08:37

## 2023-07-04 RX ADMIN — ASPIRIN 81 MG 81 MG: 81 TABLET ORAL at 11:46

## 2023-07-04 RX ADMIN — LOSARTAN POTASSIUM 100 MG: 100 TABLET, FILM COATED ORAL at 11:46

## 2023-07-04 RX ADMIN — PREDNISONE 10 MG: 5 TABLET ORAL at 17:28

## 2023-07-04 ASSESSMENT — PAIN SCALES - GENERAL
PAINLEVEL_OUTOF10: 1

## 2023-07-04 ASSESSMENT — PAIN DESCRIPTION - LOCATION
LOCATION: ABDOMEN

## 2023-07-04 NOTE — ED PROVIDER NOTES
patient's exam, leukocytosis and lactic acidosis Case was discussed with on-call general surgeon, Dr. Yue Ralph, who recommended admission for further evaluation treatment. Recommended NG tube, fluid rehydration, n.p.o. and will see patient on admission. No believe antibiotics indicated at this time. Case discussed with hospital service for admission. I am the Primary Clinician of Record. FINAL IMPRESSION      1. Ileus ([address])    2. Constipation, unspecified constipation type          DISPOSITION/PLAN     DISPOSITION Decision To Admit 07/03/2023 11:56:07 PM      PATIENT REFERRED TO:  No follow-up provider specified.     DISCHARGE MEDICATIONS:  New Prescriptions    No medications on file       DISCONTINUED MEDICATIONS:  Discontinued Medications    No medications on file              (Please note that portions of this note were completed with a voice recognition program.  Efforts were made to edit the dictations but occasionally words are mis-transcribed.)    ANA Conroy (electronically signed)       ANA Jack  07/04/23 0015

## 2023-07-04 NOTE — PLAN OF CARE
Problem: Discharge Planning  Goal: Discharge to home or other facility with appropriate resources  Outcome: Progressing  Flowsheets (Taken 7/4/2023 0830)  Discharge to home or other facility with appropriate resources: Identify barriers to discharge with patient and caregiver     Problem: Pain  Goal: Verbalizes/displays adequate comfort level or baseline comfort level  Outcome: Progressing  Flowsheets  Taken 7/4/2023 1130  Verbalizes/displays adequate comfort level or baseline comfort level: Assess pain using appropriate pain scale  Taken 7/4/2023 0815  Verbalizes/displays adequate comfort level or baseline comfort level: Assess pain using appropriate pain scale     Problem: Safety - Adult  Goal: Free from fall injury  Outcome: Progressing

## 2023-07-04 NOTE — CONSULTS
[facility]     Consult                                                     Patient Name: Moraima Thompson  MRN: [id number]  YOB: 1960  Admission date: 7/3/2023  8:51 PM   Date of evaluation: 7/4/2023  Primary Care Physician: Desi Mujica MD  Reason for consult: Abdominal pain  History of Present Illness:    Mr. Rod Verdugo is a 66 y.o. male who presents with acute onset pressure epigastric pain that has been constant and progressively worse over the last several days. Nothing made it better or worse and does not radiate. Similar pain several years ago that resolved with self-induced vomiting but unable to induce emesis this episode. Denies nasal ingestions sick contacts or travel history. Last bowel movement and flatus was prior to symptom onset. Denies fevers chills chest pain dyspnea dysuria change in appetite weight or other complaints. Work-up in emergency department consistent with ileus for which NG placed and has had high-volume since. Symptoms have nearly resolved although not flatus or stool yet since admission. Additional medical conditions include diabetes and morbid obesity.   Never had abdominal surgery    Past Medical History:        Diagnosis Date    Bone marrow donor     x 2    Fatty liver 11/16/2021    HTN (hypertension) 5/23/2012    HTN (hypertension) 5/23/2012    Hyperlipidemia     Hyperlipidemia     Hypertension     Lumbar herniated disc     x 3    Obesity     Pre-diabetes 6/4/2014    Subclinical hypothyroidism 6/4/2014    Type 2 diabetes mellitus with diabetic nephropathy, without long-term current use of insulin ([address]) 6/11/2023       Past Surgical History:        Procedure Laterality Date    COLONOSCOPY  01/16/2012    COLONOSCOPY  10/14/2014    KNEE SURGERY      OTHER SURGICAL HISTORY      epidural injection x 2    TOTAL HIP ARTHROPLASTY Right 2017    [place] Sánchez, Dr Billie Díaz       Scheduled Meds:   amLODIPine  10 mg Oral

## 2023-07-04 NOTE — H&P
(Non-Medical): No   Housing Stability: Unknown    Unstable Housing in the Last Year: No       Medications:   Medications:    sodium chloride  1,000 mL IntraVENous Once      Infusions:   PRN Meds:      Labs      CBC:   Recent Labs     07/03/23 2142   WBC 14.9*   HGB 18.0*        BMP:    Recent Labs     07/03/23 2142   *   K 4.1   CL 90*   CO2 27   BUN 33*   CREATININE 1.4*   GLUCOSE 198*     Hepatic:   Recent Labs     07/03/23 2142   AST 19   ALT 37   BILITOT 1.7*   ALKPHOS 76     Lipids:   Lab Results   Component Value Date/Time    CHOL 204 06/08/2023 12:00 PM    HDL 45 06/08/2023 12:00 PM    TRIG 222 06/08/2023 12:00 PM     Hemoglobin A1C:   Lab Results   Component Value Date/Time    LABA1C 6.7 06/08/2023 12:00 PM     TSH:   Lab Results   Component Value Date/Time    TSH 2.88 06/08/2023 12:00 PM     Troponin: No results found for: TROPONINT  Lactic Acid:   Recent Labs     07/03/23 2142 07/03/23 2333   LACTA 3.6* 3.7*     BNP: No results for input(s): PROBNP in the last 72 hours.   UA:  Lab Results   Component Value Date/Time    NITRU Negative 09/05/2019 10:26 AM    COLORU yellow 06/08/2023 11:51 AM    COLORU YELLOW 09/05/2019 10:26 AM    PHUR 5.0 06/08/2023 11:51 AM    PHUR 5.0 09/05/2019 10:26 AM    WBCUA 2 09/05/2019 10:26 AM    RBCUA 40 09/05/2019 10:26 AM    CLARITYU clear 06/08/2023 11:51 AM    CLARITYU TURBID 09/05/2019 10:26 AM    SPECGRAV 1.030 06/08/2023 11:51 AM    SPECGRAV 1.028 09/05/2019 10:26 AM    LEUKOCYTESUR neg 06/08/2023 11:51 AM    LEUKOCYTESUR Negative 09/05/2019 10:26 AM    UROBILINOGEN 0.2 09/05/2019 10:26 AM    BILIRUBINUR neg 06/08/2023 11:51 AM    BLOODU neg 06/08/2023 11:51 AM    BLOODU LARGE 09/05/2019 10:26 AM    GLUCOSEU neg 06/08/2023 11:51 AM    GLUCOSEU 100 09/05/2019 10:26 AM    KETUA neg 06/08/2023 11:51 AM    KETUA Negative 09/05/2019 10:26 AM     Urine Cultures: No results found for: LABURIN  Blood Cultures:   Lab Results   Component Value Date/Time    BC No

## 2023-07-05 ENCOUNTER — APPOINTMENT (OUTPATIENT)
Dept: GENERAL RADIOLOGY | Age: 63
End: 2023-07-05
Payer: COMMERCIAL

## 2023-07-05 LAB
ALBUMIN SERPL-MCNC: 3.9 G/DL (ref 3.4–5)
ALBUMIN/GLOB SERPL: 1.6 {RATIO} (ref 1.1–2.2)
ALP SERPL-CCNC: 56 U/L (ref 40–129)
ALT SERPL-CCNC: 29 U/L (ref 10–40)
ANION GAP SERPL CALCULATED.3IONS-SCNC: 10 MMOL/L (ref 3–16)
APTT BLD: 31.2 SEC (ref 22.7–35.9)
AST SERPL-CCNC: 16 U/L (ref 15–37)
BASOPHILS # BLD: 0 K/UL (ref 0–0.2)
BASOPHILS NFR BLD: 0.6 %
BILIRUB DIRECT SERPL-MCNC: 0.3 MG/DL (ref 0–0.3)
BILIRUB INDIRECT SERPL-MCNC: 0.8 MG/DL (ref 0–1)
BILIRUB SERPL-MCNC: 1.1 MG/DL (ref 0–1)
BUN SERPL-MCNC: 23 MG/DL (ref 7–20)
CALCIUM SERPL-MCNC: 8.6 MG/DL (ref 8.3–10.6)
CHLORIDE SERPL-SCNC: 100 MMOL/L (ref 99–110)
CO2 SERPL-SCNC: 29 MMOL/L (ref 21–32)
CREAT SERPL-MCNC: 0.9 MG/DL (ref 0.8–1.3)
DEPRECATED RDW RBC AUTO: 12.4 % (ref 12.4–15.4)
EOSINOPHIL # BLD: 0.2 K/UL (ref 0–0.6)
EOSINOPHIL NFR BLD: 2.9 %
GFR SERPLBLD CREATININE-BSD FMLA CKD-EPI: >60 ML/MIN/{1.73_M2}
GLUCOSE BLD-MCNC: 125 MG/DL (ref 70–99)
GLUCOSE BLD-MCNC: 131 MG/DL (ref 70–99)
GLUCOSE BLD-MCNC: 139 MG/DL (ref 70–99)
GLUCOSE BLD-MCNC: 141 MG/DL (ref 70–99)
GLUCOSE SERPL-MCNC: 133 MG/DL (ref 70–99)
HCT VFR BLD AUTO: 45 % (ref 40.5–52.5)
HGB BLD-MCNC: 15.1 G/DL (ref 13.5–17.5)
INR PPP: 0.98 (ref 0.84–1.16)
LACTATE BLDV-SCNC: 1.2 MMOL/L (ref 0.4–2)
LYMPHOCYTES # BLD: 1.1 K/UL (ref 1–5.1)
LYMPHOCYTES NFR BLD: 15.6 %
MAGNESIUM SERPL-MCNC: 2.8 MG/DL (ref 1.8–2.4)
MCH RBC QN AUTO: 30 PG (ref 26–34)
MCHC RBC AUTO-ENTMCNC: 33.5 G/DL (ref 31–36)
MCV RBC AUTO: 89.5 FL (ref 80–100)
MONOCYTES # BLD: 0.7 K/UL (ref 0–1.3)
MONOCYTES NFR BLD: 9.4 %
NEUTROPHILS # BLD: 5.3 K/UL (ref 1.7–7.7)
NEUTROPHILS NFR BLD: 71.5 %
PERFORMED ON: ABNORMAL
PHOSPHATE SERPL-MCNC: 3.2 MG/DL (ref 2.5–4.9)
PLATELET # BLD AUTO: 209 K/UL (ref 135–450)
PMV BLD AUTO: 7.2 FL (ref 5–10.5)
POTASSIUM SERPL-SCNC: 3.9 MMOL/L (ref 3.5–5.1)
PREALB SERPL-MCNC: 22.1 MG/DL (ref 20–40)
PROCALCITONIN SERPL IA-MCNC: 0.1 NG/ML (ref 0–0.15)
PROT SERPL-MCNC: 6.3 G/DL (ref 6.4–8.2)
PROTHROMBIN TIME: 13 SEC (ref 11.5–14.8)
RBC # BLD AUTO: 5.03 M/UL (ref 4.2–5.9)
SODIUM SERPL-SCNC: 139 MMOL/L (ref 136–145)
TRANSFERRIN SERPL-MCNC: 226 MG/DL (ref 200–360)
WBC # BLD AUTO: 7.3 K/UL (ref 4–11)

## 2023-07-05 PROCEDURE — APPSS15 APP SPLIT SHARED TIME 0-15 MINUTES: Performed by: NURSE PRACTITIONER

## 2023-07-05 PROCEDURE — 6370000000 HC RX 637 (ALT 250 FOR IP): Performed by: NURSE PRACTITIONER

## 2023-07-05 PROCEDURE — 85730 THROMBOPLASTIN TIME PARTIAL: CPT

## 2023-07-05 PROCEDURE — 6360000002 HC RX W HCPCS: Performed by: SURGERY

## 2023-07-05 PROCEDURE — 85025 COMPLETE CBC W/AUTO DIFF WBC: CPT

## 2023-07-05 PROCEDURE — 83605 ASSAY OF LACTIC ACID: CPT

## 2023-07-05 PROCEDURE — 84466 ASSAY OF TRANSFERRIN: CPT

## 2023-07-05 PROCEDURE — 82248 BILIRUBIN DIRECT: CPT

## 2023-07-05 PROCEDURE — 2580000003 HC RX 258: Performed by: SURGERY

## 2023-07-05 PROCEDURE — 2500000003 HC RX 250 WO HCPCS: Performed by: NURSE PRACTITIONER

## 2023-07-05 PROCEDURE — 80053 COMPREHEN METABOLIC PANEL: CPT

## 2023-07-05 PROCEDURE — 84100 ASSAY OF PHOSPHORUS: CPT

## 2023-07-05 PROCEDURE — 99232 SBSQ HOSP IP/OBS MODERATE 35: CPT | Performed by: SURGERY

## 2023-07-05 PROCEDURE — 84145 PROCALCITONIN (PCT): CPT

## 2023-07-05 PROCEDURE — 36415 COLL VENOUS BLD VENIPUNCTURE: CPT

## 2023-07-05 PROCEDURE — 85610 PROTHROMBIN TIME: CPT

## 2023-07-05 PROCEDURE — 83735 ASSAY OF MAGNESIUM: CPT

## 2023-07-05 PROCEDURE — 6370000000 HC RX 637 (ALT 250 FOR IP): Performed by: INTERNAL MEDICINE

## 2023-07-05 PROCEDURE — APPNB30 APP NON BILLABLE TIME 0-30 MINS: Performed by: NURSE PRACTITIONER

## 2023-07-05 PROCEDURE — 71046 X-RAY EXAM CHEST 2 VIEWS: CPT

## 2023-07-05 PROCEDURE — 84134 ASSAY OF PREALBUMIN: CPT

## 2023-07-05 PROCEDURE — 1200000000 HC SEMI PRIVATE

## 2023-07-05 RX ORDER — AMLODIPINE BESYLATE 5 MG/1
10 TABLET ORAL
Status: DISCONTINUED | OUTPATIENT
Start: 2023-07-05 | End: 2023-07-07 | Stop reason: HOSPADM

## 2023-07-05 RX ORDER — METOCLOPRAMIDE HYDROCHLORIDE 5 MG/ML
10 INJECTION INTRAMUSCULAR; INTRAVENOUS EVERY 6 HOURS
Status: COMPLETED | OUTPATIENT
Start: 2023-07-05 | End: 2023-07-06

## 2023-07-05 RX ADMIN — LOSARTAN POTASSIUM 100 MG: 100 TABLET, FILM COATED ORAL at 09:40

## 2023-07-05 RX ADMIN — ENOXAPARIN SODIUM 40 MG: 100 INJECTION SUBCUTANEOUS at 21:25

## 2023-07-05 RX ADMIN — Medication 10 ML: at 09:40

## 2023-07-05 RX ADMIN — AMLODIPINE BESYLATE 10 MG: 5 TABLET ORAL at 17:00

## 2023-07-05 RX ADMIN — METOCLOPRAMIDE 10 MG: 5 INJECTION, SOLUTION INTRAMUSCULAR; INTRAVENOUS at 21:25

## 2023-07-05 RX ADMIN — LEVOTHYROXINE SODIUM 75 MCG: 0.07 TABLET ORAL at 06:02

## 2023-07-05 RX ADMIN — METOCLOPRAMIDE 10 MG: 5 INJECTION, SOLUTION INTRAMUSCULAR; INTRAVENOUS at 17:00

## 2023-07-05 RX ADMIN — POTASSIUM CHLORIDE, DEXTROSE MONOHYDRATE AND SODIUM CHLORIDE: 300; 5; 450 INJECTION, SOLUTION INTRAVENOUS at 09:48

## 2023-07-05 RX ADMIN — PREDNISONE 10 MG: 5 TABLET ORAL at 17:00

## 2023-07-05 RX ADMIN — ENOXAPARIN SODIUM 40 MG: 100 INJECTION SUBCUTANEOUS at 08:00

## 2023-07-05 RX ADMIN — ASPIRIN 81 MG 81 MG: 81 TABLET ORAL at 09:40

## 2023-07-05 ASSESSMENT — PAIN SCALES - GENERAL: PAINLEVEL_OUTOF10: 1

## 2023-07-05 ASSESSMENT — PAIN DESCRIPTION - LOCATION: LOCATION: ABDOMEN

## 2023-07-05 NOTE — CARE COORDINATION
Discharge Planning Note:    Chart reviewed and it appears that patient has minimal needs for discharge at this time. Discussed with patient and requested that case management be notified if discharge needs are identified.     - Current discharge plan is for the patient to return home. Case management will continue to follow progress and update discharge plan as needed.       Risk of Readmission Score: 8%    MAIKEL Rojas RN    [facility]  Phone: [phone]

## 2023-07-05 NOTE — PLAN OF CARE
Problem: Discharge Planning  Goal: Discharge to home or other facility with appropriate resources  7/4/2023 2233 by Eladio Cook RN  Outcome: Progressing  Flowsheets (Taken 7/4/2023 1618 by Ana Hoffman RN)  Discharge to home or other facility with appropriate resources: Identify barriers to discharge with patient and caregiver  7/4/2023 1617 by Ana Hoffman RN  Outcome: Progressing  Flowsheets  Taken 7/4/2023 1230  Discharge to home or other facility with appropriate resources: Identify barriers to discharge with patient and caregiver  Taken 7/4/2023 0830  Discharge to home or other facility with appropriate resources: Identify barriers to discharge with patient and caregiver     Problem: Pain  Goal: Verbalizes/displays adequate comfort level or baseline comfort level  7/4/2023 2233 by Eladio Cook RN  Outcome: Progressing  Flowsheets (Taken 7/4/2023 1645 by Ana Hoffman RN)  Verbalizes/displays adequate comfort level or baseline comfort level: Assess pain using appropriate pain scale  7/4/2023 1617 by Ana Hoffman RN  Outcome: Progressing  Flowsheets  Taken 7/4/2023 1130  Verbalizes/displays adequate comfort level or baseline comfort level: Assess pain using appropriate pain scale  Taken 7/4/2023 0815  Verbalizes/displays adequate comfort level or baseline comfort level: Assess pain using appropriate pain scale     Problem: Safety - Adult  Goal: Free from fall injury  7/4/2023 2233 by Eladio Cook RN  Outcome: Progressing  7/4/2023 1617 by Ana Hoffman RN  Outcome: Progressing

## 2023-07-05 NOTE — PLAN OF CARE
Problem: Discharge Planning  Goal: Discharge to home or other facility with appropriate resources  Outcome: Progressing     Problem: Pain  Goal: Verbalizes/displays adequate comfort level or baseline comfort level  Outcome: Progressing  Flowsheets (Taken 7/5/2023 0930)  Verbalizes/displays adequate comfort level or baseline comfort level: Assess pain using appropriate pain scale     Problem: Safety - Adult  Goal: Free from fall injury  Outcome: Progressing

## 2023-07-06 ENCOUNTER — APPOINTMENT (OUTPATIENT)
Dept: CT IMAGING | Age: 63
End: 2023-07-06
Payer: COMMERCIAL

## 2023-07-06 LAB
ANION GAP SERPL CALCULATED.3IONS-SCNC: 10 MMOL/L (ref 3–16)
BASOPHILS # BLD: 0.1 K/UL (ref 0–0.2)
BASOPHILS NFR BLD: 0.7 %
BUN SERPL-MCNC: 19 MG/DL (ref 7–20)
CALCIUM SERPL-MCNC: 8.7 MG/DL (ref 8.3–10.6)
CHLORIDE SERPL-SCNC: 102 MMOL/L (ref 99–110)
CO2 SERPL-SCNC: 28 MMOL/L (ref 21–32)
CREAT SERPL-MCNC: 0.9 MG/DL (ref 0.8–1.3)
DEPRECATED RDW RBC AUTO: 12.3 % (ref 12.4–15.4)
EOSINOPHIL # BLD: 0.2 K/UL (ref 0–0.6)
EOSINOPHIL NFR BLD: 2.6 %
GFR SERPLBLD CREATININE-BSD FMLA CKD-EPI: >60 ML/MIN/{1.73_M2}
GLUCOSE BLD-MCNC: 110 MG/DL (ref 70–99)
GLUCOSE BLD-MCNC: 111 MG/DL (ref 70–99)
GLUCOSE BLD-MCNC: 184 MG/DL (ref 70–99)
GLUCOSE SERPL-MCNC: 132 MG/DL (ref 70–99)
HCT VFR BLD AUTO: 43.8 % (ref 40.5–52.5)
HGB BLD-MCNC: 14.7 G/DL (ref 13.5–17.5)
LYMPHOCYTES # BLD: 1 K/UL (ref 1–5.1)
LYMPHOCYTES NFR BLD: 12.4 %
MAGNESIUM SERPL-MCNC: 2.6 MG/DL (ref 1.8–2.4)
MCH RBC QN AUTO: 30.1 PG (ref 26–34)
MCHC RBC AUTO-ENTMCNC: 33.7 G/DL (ref 31–36)
MCV RBC AUTO: 89.3 FL (ref 80–100)
MONOCYTES # BLD: 0.6 K/UL (ref 0–1.3)
MONOCYTES NFR BLD: 8 %
NEUTROPHILS # BLD: 5.9 K/UL (ref 1.7–7.7)
NEUTROPHILS NFR BLD: 76.3 %
PERFORMED ON: ABNORMAL
PHOSPHATE SERPL-MCNC: 3.2 MG/DL (ref 2.5–4.9)
PLATELET # BLD AUTO: 207 K/UL (ref 135–450)
PMV BLD AUTO: 7 FL (ref 5–10.5)
POTASSIUM SERPL-SCNC: 4.2 MMOL/L (ref 3.5–5.1)
RBC # BLD AUTO: 4.9 M/UL (ref 4.2–5.9)
SODIUM SERPL-SCNC: 140 MMOL/L (ref 136–145)
WBC # BLD AUTO: 7.7 K/UL (ref 4–11)

## 2023-07-06 PROCEDURE — 6360000002 HC RX W HCPCS: Performed by: SURGERY

## 2023-07-06 PROCEDURE — 2500000003 HC RX 250 WO HCPCS: Performed by: NURSE PRACTITIONER

## 2023-07-06 PROCEDURE — APPSS15 APP SPLIT SHARED TIME 0-15 MINUTES: Performed by: NURSE PRACTITIONER

## 2023-07-06 PROCEDURE — 94150 VITAL CAPACITY TEST: CPT

## 2023-07-06 PROCEDURE — 80048 BASIC METABOLIC PNL TOTAL CA: CPT

## 2023-07-06 PROCEDURE — 84100 ASSAY OF PHOSPHORUS: CPT

## 2023-07-06 PROCEDURE — 6370000000 HC RX 637 (ALT 250 FOR IP): Performed by: NURSE PRACTITIONER

## 2023-07-06 PROCEDURE — 83735 ASSAY OF MAGNESIUM: CPT

## 2023-07-06 PROCEDURE — 6360000004 HC RX CONTRAST MEDICATION: Performed by: NURSE PRACTITIONER

## 2023-07-06 PROCEDURE — 99232 SBSQ HOSP IP/OBS MODERATE 35: CPT | Performed by: SURGERY

## 2023-07-06 PROCEDURE — C9113 INJ PANTOPRAZOLE SODIUM, VIA: HCPCS | Performed by: NURSE PRACTITIONER

## 2023-07-06 PROCEDURE — 74177 CT ABD & PELVIS W/CONTRAST: CPT

## 2023-07-06 PROCEDURE — 2580000003 HC RX 258: Performed by: SURGERY

## 2023-07-06 PROCEDURE — 85025 COMPLETE CBC W/AUTO DIFF WBC: CPT

## 2023-07-06 PROCEDURE — APPNB30 APP NON BILLABLE TIME 0-30 MINS: Performed by: NURSE PRACTITIONER

## 2023-07-06 PROCEDURE — 94760 N-INVAS EAR/PLS OXIMETRY 1: CPT

## 2023-07-06 PROCEDURE — 6360000002 HC RX W HCPCS: Performed by: NURSE PRACTITIONER

## 2023-07-06 PROCEDURE — 1200000000 HC SEMI PRIVATE

## 2023-07-06 PROCEDURE — 6370000000 HC RX 637 (ALT 250 FOR IP): Performed by: INTERNAL MEDICINE

## 2023-07-06 RX ORDER — PANTOPRAZOLE SODIUM 40 MG/10ML
40 INJECTION, POWDER, LYOPHILIZED, FOR SOLUTION INTRAVENOUS DAILY
Status: DISCONTINUED | OUTPATIENT
Start: 2023-07-06 | End: 2023-07-07 | Stop reason: HOSPADM

## 2023-07-06 RX ORDER — METOCLOPRAMIDE HYDROCHLORIDE 5 MG/ML
10 INJECTION INTRAMUSCULAR; INTRAVENOUS EVERY 6 HOURS
Status: COMPLETED | OUTPATIENT
Start: 2023-07-06 | End: 2023-07-07

## 2023-07-06 RX ADMIN — LOSARTAN POTASSIUM 100 MG: 100 TABLET, FILM COATED ORAL at 09:36

## 2023-07-06 RX ADMIN — DIATRIZOATE MEGLUMINE AND DIATRIZOATE SODIUM 5 ML: 660; 100 LIQUID ORAL; RECTAL at 12:20

## 2023-07-06 RX ADMIN — ENOXAPARIN SODIUM 40 MG: 100 INJECTION SUBCUTANEOUS at 22:03

## 2023-07-06 RX ADMIN — METOCLOPRAMIDE 10 MG: 5 INJECTION, SOLUTION INTRAMUSCULAR; INTRAVENOUS at 22:02

## 2023-07-06 RX ADMIN — LEVOTHYROXINE SODIUM 75 MCG: 0.07 TABLET ORAL at 05:23

## 2023-07-06 RX ADMIN — METOCLOPRAMIDE 10 MG: 5 INJECTION, SOLUTION INTRAMUSCULAR; INTRAVENOUS at 16:07

## 2023-07-06 RX ADMIN — POTASSIUM CHLORIDE, DEXTROSE MONOHYDRATE AND SODIUM CHLORIDE: 300; 5; 450 INJECTION, SOLUTION INTRAVENOUS at 16:51

## 2023-07-06 RX ADMIN — ASPIRIN 81 MG 81 MG: 81 TABLET ORAL at 09:36

## 2023-07-06 RX ADMIN — ENOXAPARIN SODIUM 40 MG: 100 INJECTION SUBCUTANEOUS at 09:36

## 2023-07-06 RX ADMIN — METOCLOPRAMIDE 10 MG: 5 INJECTION, SOLUTION INTRAMUSCULAR; INTRAVENOUS at 09:36

## 2023-07-06 RX ADMIN — IOHEXOL 75 ML: 350 INJECTION, SOLUTION INTRAVENOUS at 15:20

## 2023-07-06 RX ADMIN — Medication 10 ML: at 11:40

## 2023-07-06 RX ADMIN — PANTOPRAZOLE SODIUM 40 MG: 40 INJECTION, POWDER, FOR SOLUTION INTRAVENOUS at 12:17

## 2023-07-06 RX ADMIN — PREDNISONE 10 MG: 5 TABLET ORAL at 17:41

## 2023-07-06 RX ADMIN — METOCLOPRAMIDE 10 MG: 5 INJECTION, SOLUTION INTRAMUSCULAR; INTRAVENOUS at 04:46

## 2023-07-06 RX ADMIN — POTASSIUM CHLORIDE, DEXTROSE MONOHYDRATE AND SODIUM CHLORIDE: 300; 5; 450 INJECTION, SOLUTION INTRAVENOUS at 05:23

## 2023-07-06 RX ADMIN — AMLODIPINE BESYLATE 10 MG: 5 TABLET ORAL at 17:41

## 2023-07-06 ASSESSMENT — PAIN SCALES - GENERAL: PAINLEVEL_OUTOF10: 0

## 2023-07-06 NOTE — PLAN OF CARE
Problem: Discharge Planning  Goal: Discharge to home or other facility with appropriate resources  7/6/2023 1010 by Lilia Candelario RN  Outcome: Progressing  7/5/2023 2202 by Candance Corporal, RN  Outcome: Progressing     Problem: Pain  Goal: Verbalizes/displays adequate comfort level or baseline comfort level  7/6/2023 1010 by Lilia Candelario RN  Outcome: Progressing  7/5/2023 2202 by Candance Corporal, RN  Outcome: Progressing     Problem: Safety - Adult  Goal: Free from fall injury  7/6/2023 1010 by Lilia Candelario RN  Outcome: Progressing  7/5/2023 2202 by Candance Corporal, RN  Outcome: Progressing

## 2023-07-06 NOTE — PLAN OF CARE
Problem: Discharge Planning  Goal: Discharge to home or other facility with appropriate resources  7/5/2023 2202 by Sravani Phipps RN  Outcome: Progressing  7/5/2023 1337 by Dona Morris RN  Outcome: Progressing     Problem: Pain  Goal: Verbalizes/displays adequate comfort level or baseline comfort level  7/5/2023 2202 by Sravani Phipps RN  Outcome: Progressing  7/5/2023 1337 by Dona Morris RN  Outcome: Progressing  Flowsheets (Taken 7/5/2023 0930)  Verbalizes/displays adequate comfort level or baseline comfort level: Assess pain using appropriate pain scale     Problem: Safety - Adult  Goal: Free from fall injury  7/5/2023 2202 by Sravani Phipps RN  Outcome: Progressing  7/5/2023 1337 by Dona Morris RN  Outcome: Progressing

## 2023-07-07 ENCOUNTER — TELEPHONE (OUTPATIENT)
Dept: FAMILY MEDICINE CLINIC | Age: 63
End: 2023-07-07

## 2023-07-07 VITALS
HEIGHT: 73 IN | DIASTOLIC BLOOD PRESSURE: 81 MMHG | OXYGEN SATURATION: 96 % | TEMPERATURE: 98.5 F | BODY MASS INDEX: 41.75 KG/M2 | RESPIRATION RATE: 18 BRPM | WEIGHT: 315 LBS | SYSTOLIC BLOOD PRESSURE: 147 MMHG | HEART RATE: 67 BPM

## 2023-07-07 LAB
ANION GAP SERPL CALCULATED.3IONS-SCNC: 13 MMOL/L (ref 3–16)
BASOPHILS # BLD: 0.1 K/UL (ref 0–0.2)
BASOPHILS NFR BLD: 0.8 %
BUN SERPL-MCNC: 14 MG/DL (ref 7–20)
CALCIUM SERPL-MCNC: 8.6 MG/DL (ref 8.3–10.6)
CHLORIDE SERPL-SCNC: 104 MMOL/L (ref 99–110)
CO2 SERPL-SCNC: 20 MMOL/L (ref 21–32)
CREAT SERPL-MCNC: 0.9 MG/DL (ref 0.8–1.3)
DEPRECATED RDW RBC AUTO: 12.3 % (ref 12.4–15.4)
EOSINOPHIL # BLD: 0.2 K/UL (ref 0–0.6)
EOSINOPHIL NFR BLD: 2.8 %
GFR SERPLBLD CREATININE-BSD FMLA CKD-EPI: >60 ML/MIN/{1.73_M2}
GLUCOSE BLD-MCNC: 105 MG/DL (ref 70–99)
GLUCOSE SERPL-MCNC: 124 MG/DL (ref 70–99)
HCT VFR BLD AUTO: 42.7 % (ref 40.5–52.5)
HGB BLD-MCNC: 14.4 G/DL (ref 13.5–17.5)
LYMPHOCYTES # BLD: 1.1 K/UL (ref 1–5.1)
LYMPHOCYTES NFR BLD: 16.4 %
MAGNESIUM SERPL-MCNC: 2.5 MG/DL (ref 1.8–2.4)
MCH RBC QN AUTO: 30.1 PG (ref 26–34)
MCHC RBC AUTO-ENTMCNC: 33.8 G/DL (ref 31–36)
MCV RBC AUTO: 89.1 FL (ref 80–100)
MONOCYTES # BLD: 0.5 K/UL (ref 0–1.3)
MONOCYTES NFR BLD: 7.1 %
NEUTROPHILS # BLD: 4.9 K/UL (ref 1.7–7.7)
NEUTROPHILS NFR BLD: 72.9 %
PERFORMED ON: ABNORMAL
PHOSPHATE SERPL-MCNC: 3.5 MG/DL (ref 2.5–4.9)
PLATELET # BLD AUTO: 207 K/UL (ref 135–450)
PMV BLD AUTO: 7.5 FL (ref 5–10.5)
POTASSIUM SERPL-SCNC: 4.1 MMOL/L (ref 3.5–5.1)
RBC # BLD AUTO: 4.79 M/UL (ref 4.2–5.9)
SODIUM SERPL-SCNC: 137 MMOL/L (ref 136–145)
WBC # BLD AUTO: 6.7 K/UL (ref 4–11)

## 2023-07-07 PROCEDURE — 2580000003 HC RX 258: Performed by: SURGERY

## 2023-07-07 PROCEDURE — 36415 COLL VENOUS BLD VENIPUNCTURE: CPT

## 2023-07-07 PROCEDURE — 6370000000 HC RX 637 (ALT 250 FOR IP): Performed by: NURSE PRACTITIONER

## 2023-07-07 PROCEDURE — APPSS15 APP SPLIT SHARED TIME 0-15 MINUTES: Performed by: NURSE PRACTITIONER

## 2023-07-07 PROCEDURE — C9113 INJ PANTOPRAZOLE SODIUM, VIA: HCPCS | Performed by: NURSE PRACTITIONER

## 2023-07-07 PROCEDURE — 85025 COMPLETE CBC W/AUTO DIFF WBC: CPT

## 2023-07-07 PROCEDURE — APPNB30 APP NON BILLABLE TIME 0-30 MINS: Performed by: NURSE PRACTITIONER

## 2023-07-07 PROCEDURE — 6360000002 HC RX W HCPCS: Performed by: SURGERY

## 2023-07-07 PROCEDURE — 80048 BASIC METABOLIC PNL TOTAL CA: CPT

## 2023-07-07 PROCEDURE — 83735 ASSAY OF MAGNESIUM: CPT

## 2023-07-07 PROCEDURE — 99232 SBSQ HOSP IP/OBS MODERATE 35: CPT | Performed by: SURGERY

## 2023-07-07 PROCEDURE — 6370000000 HC RX 637 (ALT 250 FOR IP): Performed by: INTERNAL MEDICINE

## 2023-07-07 PROCEDURE — 6360000002 HC RX W HCPCS: Performed by: NURSE PRACTITIONER

## 2023-07-07 PROCEDURE — 84100 ASSAY OF PHOSPHORUS: CPT

## 2023-07-07 RX ORDER — DOCUSATE SODIUM 100 MG/1
100 CAPSULE, LIQUID FILLED ORAL 2 TIMES DAILY
Status: DISCONTINUED | OUTPATIENT
Start: 2023-07-07 | End: 2023-07-07 | Stop reason: HOSPADM

## 2023-07-07 RX ORDER — MAGNESIUM CARB/ALUMINUM HYDROX 105-160MG
30 TABLET,CHEWABLE ORAL ONCE
Status: DISCONTINUED | OUTPATIENT
Start: 2023-07-07 | End: 2023-07-07 | Stop reason: HOSPADM

## 2023-07-07 RX ORDER — DIPHENHYDRAMINE HCL 25 MG
25 TABLET ORAL EVERY 6 HOURS PRN
Status: DISCONTINUED | OUTPATIENT
Start: 2023-07-07 | End: 2023-07-07 | Stop reason: HOSPADM

## 2023-07-07 RX ORDER — POLYETHYLENE GLYCOL 3350 17 G/17G
17 POWDER, FOR SOLUTION ORAL DAILY
Status: DISCONTINUED | OUTPATIENT
Start: 2023-07-07 | End: 2023-07-07 | Stop reason: HOSPADM

## 2023-07-07 RX ADMIN — ASPIRIN 81 MG 81 MG: 81 TABLET ORAL at 08:34

## 2023-07-07 RX ADMIN — LEVOTHYROXINE SODIUM 75 MCG: 0.07 TABLET ORAL at 07:22

## 2023-07-07 RX ADMIN — PANTOPRAZOLE SODIUM 40 MG: 40 INJECTION, POWDER, FOR SOLUTION INTRAVENOUS at 08:36

## 2023-07-07 RX ADMIN — ENOXAPARIN SODIUM 40 MG: 100 INJECTION SUBCUTANEOUS at 08:35

## 2023-07-07 RX ADMIN — DIPHENHYDRAMINE HYDROCHLORIDE 25 MG: 25 TABLET ORAL at 02:19

## 2023-07-07 RX ADMIN — METOCLOPRAMIDE 10 MG: 5 INJECTION, SOLUTION INTRAMUSCULAR; INTRAVENOUS at 08:35

## 2023-07-07 RX ADMIN — METOCLOPRAMIDE 10 MG: 5 INJECTION, SOLUTION INTRAMUSCULAR; INTRAVENOUS at 02:19

## 2023-07-07 RX ADMIN — LOSARTAN POTASSIUM 100 MG: 100 TABLET, FILM COATED ORAL at 08:35

## 2023-07-07 RX ADMIN — Medication 10 ML: at 08:36

## 2023-07-07 NOTE — DISCHARGE SUMMARY
tablet  Commonly known as: GLUCOPHAGE-XR  Take 1 tablet by mouth daily (with breakfast)     sildenafil 100 MG tablet  Commonly known as: VIAGRA  Take 1 tablet by mouth daily as needed for Erectile Dysfunction            STOP taking these medications      predniSONE 10 MG tablet  Commonly known as: [facility]              Discharge recommendations given to patient. Follow Up. in 1 week   Disposition. home  Activity. As tolerated   Diet: ADULT DIET; Regular      Spent 35 minutes in discharge process.     Signed:  MERCY Hicks CNP     7/7/2023 1:44 PM

## 2023-07-07 NOTE — PLAN OF CARE
Problem: Discharge Planning  Goal: Discharge to home or other facility with appropriate resources  7/7/2023 0927 by Eda Ayala RN  Outcome: Progressing  7/7/2023 0135 by Bismark Espinoza RN  Outcome: Progressing     Problem: Pain  Goal: Verbalizes/displays adequate comfort level or baseline comfort level  7/7/2023 0927 by Eda Ayala RN  Outcome: Progressing  7/7/2023 0135 by Bismark Espinoza RN  Outcome: Progressing     Problem: Safety - Adult  Goal: Free from fall injury  7/7/2023 0927 by Eda Ayala RN  Outcome: Progressing  7/7/2023 0135 by Bismark Espinoza RN  Outcome: Progressing

## 2023-07-13 ENCOUNTER — TELEMEDICINE (OUTPATIENT)
Dept: FAMILY MEDICINE CLINIC | Age: 63
End: 2023-07-13
Payer: COMMERCIAL

## 2023-07-13 DIAGNOSIS — Z12.11 COLON CANCER SCREENING: Primary | ICD-10-CM

## 2023-07-13 PROCEDURE — 99214 OFFICE O/P EST MOD 30 MIN: CPT | Performed by: FAMILY MEDICINE

## 2023-07-13 NOTE — PROGRESS NOTES
Javi Botello (:  1960) is a Established patient, presenting virtually for evaluation of the following:  Notes, labs,tests, discharge summary, imaging, procedures from recent hospitalization reviewed    Assessment & Plan   Below is the assessment and plan developed based on review of pertinent history, physical exam, labs, studies, and medications. Ileus-resolved   Obesity-ongoing  Diabetes-stop metformin  History of pulmonary embolism with COVID-resolved off anticoagulation  Hip pain  Subjective   HPI hospitalized 7 3 through  at [facility] for an ileus. He had abdominal distention and constipation. Much relief with the NG tube. CT scan confirmed only ileus.   Review of Systems   Still having hip pain was told by a PA at [place] that he is too heavy to have surgery  Wants to stop his metformin and see how he does  His weight is down about 365    Objective   Patient-Reported Vitals  No data recorded     Physical Exam  [INSTRUCTIONS:  \"[x]\" Indicates a positive item  \"[]\" Indicates a negative item  -- DELETE ALL ITEMS NOT EXAMINED]    Constitutional: [x] Appears well-developed and well-nourished [x] No apparent distress, obese    [] Abnormal -     Mental status: [x] Alert and awake  [x] Oriented to person/place/time [x] Able to follow commands    [] Abnormal -     Eyes:   EOM    [x]  Normal    [] Abnormal -   Sclera  [x]  Normal    [] Abnormal -          Discharge [x]  None visible   [] Abnormal -     HENT: [x] Normocephalic, atraumatic  [] Abnormal -   [x] Mouth/Throat: Mucous membranes are moist    External Ears [x] Normal  [] Abnormal -    Neck: [x] No visualized mass [] Abnormal -     Pulmonary/Chest: [x] Respiratory effort normal   [x] No visualized signs of difficulty breathing or respiratory distress        [] Abnormal -      Musculoskeletal:   [] Normal gait with no signs of ataxia         [x] Normal range of motion of neck        [] Abnormal -     Neurological:        [x] No Facial

## 2023-07-27 DIAGNOSIS — I10 ESSENTIAL HYPERTENSION: Primary | ICD-10-CM

## 2023-07-27 RX ORDER — AMLODIPINE BESYLATE 10 MG/1
TABLET ORAL
Qty: 30 TABLET | Refills: 11 | Status: SHIPPED | OUTPATIENT
Start: 2023-07-27

## 2023-07-27 RX ORDER — LOSARTAN POTASSIUM AND HYDROCHLOROTHIAZIDE 25; 100 MG/1; MG/1
TABLET ORAL
Qty: 90 TABLET | Refills: 3 | Status: SHIPPED | OUTPATIENT
Start: 2023-07-27

## 2023-07-27 NOTE — TELEPHONE ENCOUNTER
Medication:   Requested Prescriptions     Pending Prescriptions Disp Refills    amLODIPine (NORVASC) 10 MG tablet [Pharmacy Med Name: AMLODIPINE BESYLATE TABS 10MG] 30 tablet 11     Sig: TAKE 1 TABLET DAILY    losartan-hydroCHLOROthiazide (HYZAAR) 100-25 MG per tablet [Pharmacy Med Name: LOSARTAN/ HYDROCHLOROTHIAZIDE TABS 100/25MG] 90 tablet 3     Sig: TAKE 1 TABLET EVERY MORNING ( NO FURTHER REFILLS NEED APPOINTMENT ASAP )       Last appt: 7/13/2023   Next appt: Visit date not found    Lab Results   Component Value Date     07/07/2023    K 4.1 07/07/2023     07/07/2023    CO2 20 (L) 07/07/2023    BUN 14 07/07/2023    CREATININE 0.9 07/07/2023    GLUCOSE 124 (H) 07/07/2023    CALCIUM 8.6 07/07/2023    PROT 6.3 (L) 07/05/2023    LABALBU 3.9 07/05/2023    BILITOT 1.1 (H) 07/05/2023    ALKPHOS 56 07/05/2023    AST 16 07/05/2023    ALT 29 07/05/2023    LABGLOM >60 07/07/2023    GFRAA >60 01/05/2022    AGRATIO 1.6 07/05/2023    GLOB 2.6 06/29/2021

## 2023-10-19 DIAGNOSIS — I10 ESSENTIAL HYPERTENSION: ICD-10-CM

## 2023-10-19 RX ORDER — AMLODIPINE BESYLATE 10 MG/1
10 TABLET ORAL DAILY
Qty: 90 TABLET | Refills: 3 | Status: SHIPPED | OUTPATIENT
Start: 2023-10-19

## 2023-10-19 RX ORDER — SEMAGLUTIDE 0.5 MG/.5ML
0.5 INJECTION, SOLUTION SUBCUTANEOUS
Qty: 2 ML | Refills: 0 | Status: SHIPPED | OUTPATIENT
Start: 2023-10-19

## 2023-11-06 ENCOUNTER — ANESTHESIA EVENT (OUTPATIENT)
Dept: ENDOSCOPY | Age: 63
End: 2023-11-06
Payer: COMMERCIAL

## 2023-11-06 ENCOUNTER — ANESTHESIA (OUTPATIENT)
Dept: ENDOSCOPY | Age: 63
End: 2023-11-06
Payer: COMMERCIAL

## 2023-11-06 ENCOUNTER — HOSPITAL ENCOUNTER (OUTPATIENT)
Age: 63
Setting detail: OUTPATIENT SURGERY
Discharge: HOME | End: 2023-11-06
Attending: INTERNAL MEDICINE | Admitting: INTERNAL MEDICINE
Payer: COMMERCIAL

## 2023-11-06 VITALS
HEART RATE: 55 BPM | DIASTOLIC BLOOD PRESSURE: 75 MMHG | SYSTOLIC BLOOD PRESSURE: 137 MMHG | WEIGHT: 315 LBS | TEMPERATURE: 97.5 F | OXYGEN SATURATION: 97 % | RESPIRATION RATE: 21 BRPM | HEIGHT: 72 IN | BODY MASS INDEX: 42.66 KG/M2

## 2023-11-06 DIAGNOSIS — Z12.11 COLON CANCER SCREENING: ICD-10-CM

## 2023-11-06 LAB
GLUCOSE BLD-MCNC: 114 MG/DL (ref 70–99)
PERFORMED ON: ABNORMAL

## 2023-11-06 PROCEDURE — 7100000010 HC PHASE II RECOVERY - FIRST 15 MIN: Performed by: INTERNAL MEDICINE

## 2023-11-06 PROCEDURE — 6360000002 HC RX W HCPCS: Performed by: NURSE ANESTHETIST, CERTIFIED REGISTERED

## 2023-11-06 PROCEDURE — 2500000003 HC RX 250 WO HCPCS: Performed by: NURSE ANESTHETIST, CERTIFIED REGISTERED

## 2023-11-06 PROCEDURE — 7100000000 HC PACU RECOVERY - FIRST 15 MIN: Performed by: INTERNAL MEDICINE

## 2023-11-06 PROCEDURE — 2580000003 HC RX 258: Performed by: STUDENT IN AN ORGANIZED HEALTH CARE EDUCATION/TRAINING PROGRAM

## 2023-11-06 PROCEDURE — 3700000000 HC ANESTHESIA ATTENDED CARE: Performed by: INTERNAL MEDICINE

## 2023-11-06 PROCEDURE — 7100000011 HC PHASE II RECOVERY - ADDTL 15 MIN: Performed by: INTERNAL MEDICINE

## 2023-11-06 PROCEDURE — 3700000001 HC ADD 15 MINUTES (ANESTHESIA): Performed by: INTERNAL MEDICINE

## 2023-11-06 PROCEDURE — 2709999900 HC NON-CHARGEABLE SUPPLY: Performed by: INTERNAL MEDICINE

## 2023-11-06 PROCEDURE — 7100000001 HC PACU RECOVERY - ADDTL 15 MIN: Performed by: INTERNAL MEDICINE

## 2023-11-06 PROCEDURE — 88305 TISSUE EXAM BY PATHOLOGIST: CPT

## 2023-11-06 PROCEDURE — 3609010600 HC COLONOSCOPY POLYPECTOMY SNARE/COLD BIOPSY: Performed by: INTERNAL MEDICINE

## 2023-11-06 RX ORDER — MULTIVIT WITH MINERALS/LUTEIN
1000 TABLET ORAL DAILY
COMMUNITY

## 2023-11-06 RX ORDER — ZINC GLUCONATE 50 MG
50 TABLET ORAL DAILY
COMMUNITY

## 2023-11-06 RX ORDER — TURMERIC ROOT EXTRACT 500 MG
1000 TABLET ORAL DAILY
COMMUNITY

## 2023-11-06 RX ORDER — MULTIVIT-MIN/IRON/FOLIC ACID/K 18-600-40
CAPSULE ORAL DAILY
COMMUNITY

## 2023-11-06 RX ORDER — OMEGA-3 FATTY ACIDS CAP DELAYED RELEASE 1000 MG 1000 MG
3000 CAPSULE DELAYED RELEASE ORAL DAILY
COMMUNITY

## 2023-11-06 RX ORDER — SODIUM CHLORIDE 9 MG/ML
INJECTION, SOLUTION INTRAVENOUS CONTINUOUS
Status: DISCONTINUED | OUTPATIENT
Start: 2023-11-06 | End: 2023-11-06 | Stop reason: HOSPADM

## 2023-11-06 RX ORDER — LIDOCAINE HYDROCHLORIDE 20 MG/ML
INJECTION, SOLUTION EPIDURAL; INFILTRATION; INTRACAUDAL; PERINEURAL PRN
Status: DISCONTINUED | OUTPATIENT
Start: 2023-11-06 | End: 2023-11-06 | Stop reason: SDUPTHER

## 2023-11-06 RX ORDER — PROPOFOL 10 MG/ML
INJECTION, EMULSION INTRAVENOUS PRN
Status: DISCONTINUED | OUTPATIENT
Start: 2023-11-06 | End: 2023-11-06 | Stop reason: SDUPTHER

## 2023-11-06 RX ADMIN — PROPOFOL 100 MG: 10 INJECTION, EMULSION INTRAVENOUS at 14:10

## 2023-11-06 RX ADMIN — PROPOFOL 50 MG: 10 INJECTION, EMULSION INTRAVENOUS at 14:14

## 2023-11-06 RX ADMIN — PROPOFOL 50 MG: 10 INJECTION, EMULSION INTRAVENOUS at 14:23

## 2023-11-06 RX ADMIN — PROPOFOL 50 MG: 10 INJECTION, EMULSION INTRAVENOUS at 14:18

## 2023-11-06 RX ADMIN — PROPOFOL 50 MG: 10 INJECTION, EMULSION INTRAVENOUS at 14:29

## 2023-11-06 RX ADMIN — PROPOFOL 50 MG: 10 INJECTION, EMULSION INTRAVENOUS at 14:16

## 2023-11-06 RX ADMIN — LIDOCAINE HYDROCHLORIDE 100 MG: 20 INJECTION, SOLUTION EPIDURAL; INFILTRATION; INTRACAUDAL; PERINEURAL at 14:10

## 2023-11-06 RX ADMIN — PROPOFOL 50 MG: 10 INJECTION, EMULSION INTRAVENOUS at 14:36

## 2023-11-06 RX ADMIN — PROPOFOL 50 MG: 10 INJECTION, EMULSION INTRAVENOUS at 14:12

## 2023-11-06 RX ADMIN — SODIUM CHLORIDE: 9 INJECTION, SOLUTION INTRAVENOUS at 13:36

## 2023-11-06 RX ADMIN — PROPOFOL 50 MG: 10 INJECTION, EMULSION INTRAVENOUS at 14:32

## 2023-11-06 RX ADMIN — PROPOFOL 50 MG: 10 INJECTION, EMULSION INTRAVENOUS at 14:21

## 2023-11-06 ASSESSMENT — ENCOUNTER SYMPTOMS: SHORTNESS OF BREATH: 0

## 2023-11-06 ASSESSMENT — PAIN - FUNCTIONAL ASSESSMENT: PAIN_FUNCTIONAL_ASSESSMENT: NONE - DENIES PAIN

## 2023-11-06 NOTE — BRIEF OP NOTE
Brief Postoperative Note - Full Note in Chart Review/Procedures tab       Patient: Jing Bah  YOB: 1960  MRN: [id number]    Date of Procedure: 11/6/2023    Pre-Op Diagnosis Codes:     * Colon cancer screening [Z12.11]    Post-Op Diagnosis: Same       Procedure(s):  COLONOSCOPY POLYPECTOMY SNARE/COLD BIOPSY    Surgeon(s):  Winter Chilel MD    Assistant:  * No surgical staff found *    Anesthesia: Monitor Anesthesia Care    Estimated Blood Loss (mL): Minimal    Complications: None    Specimens:   ID Type Source Tests Collected by Time Destination   A : Cecum polyp x 4 Tissue Colon SURGICAL PATHOLOGY Consuelo Angeles RN 11/6/2023 1421        Implants:  * No implants in log *      Drains: * No LDAs found *    Findings:   Four polyps of cecum - D12.0  Moderated left colon diverticulosis - K57.30  Normal terminal ileum    Rec:  Resume diet  Continue present treatment. Await pathology results.    F/U WITH ME AS NEEDED  Colonoscopy in 3 years depending on pathology  Followup with referring physician as previously instucted      Electronically signed by Winter Chilel MD on 11/6/2023 at 2:53 PM

## 2023-11-06 NOTE — PROGRESS NOTES
Discharge instructions review with patient and wife Jose Elias Marco Antonio. All home medications have been reviewed, pt v/u. Discharge instructions signed. Pt discharged via wheelchair. Pt discharged with all belongings. Jose Elias Bernard taking stable pt home.

## 2023-11-06 NOTE — H&P
Dysfunction 30 tablet 1    aspirin 81 MG tablet Take 1 tablet by mouth daily          Allergies:  Patient has no known allergies. Social History:   Social History     Tobacco Use    Smoking status: Never    Smokeless tobacco: Never   Substance Use Topics    Alcohol use: Yes     Alcohol/week: 4.0 - 5.0 standard drinks of alcohol     Types: 4 - 5 Cans of beer per week     Comment: occasional     Family History:   Family History   Problem Relation Age of Onset    High Blood Pressure Mother     High Blood Pressure Father     Cancer Father         prostate, melanoma    Cancer Sister         Edenilson Ports Sister         melanoma    Cancer Sister         leukemia       PHYSICAL EXAM:      There were no vitals taken for this visit. I        Heart:   RRR, normal s1s2    Lungs:  CTA bilat,  Normal effort    Abdomen:   NT, ND      ASA Grade:  ASA 3 - Patient with moderate systemic disease with functional limitations    Mallampati Class:  Class I: Soft palate, uvula, fauces, pillars visible  __________  Class II: Soft palate, uvula, fauces visible  __________   Class III: Soft palate, base of uvula visible  ____X_____  Class IV: Hard palate only visible   __________        ASSESSMENT AND PLAN:    1. Patient is a 66 y.o. male here for colonoscopy with MAC.   2.  Procedure options, risks and benefits reviewed with patient. Patient expresses understanding.      Gloria Lagos MD  [address] [place] [address]  11/6/2023

## 2023-11-06 NOTE — PROGRESS NOTES
Pt arrived from ENDO to PACU bay 10. Report received from ENDO staff. Pt slightly arousable to voice. Pt on 4L NC, NSR, and VSS. Will continue to monitor.

## 2023-11-07 NOTE — ANESTHESIA POSTPROCEDURE EVALUATION
Department of Anesthesiology  Postprocedure Note    Patient: Anil Bowser  MRN: [id number]  YOB: 1960  Date of evaluation: 11/7/2023      Procedure Summary     Date: 11/06/23 Room / Location: [address] [place] [address]    Anesthesia Start: 1405 Anesthesia Stop: 1449    Procedure: COLONOSCOPY POLYPECTOMY SNARE/COLD BIOPSY Diagnosis:       Colon cancer screening      (Colon cancer screening [Z12.11])    Surgeons: Isac Steve MD Responsible Provider: Fide Booth MD    Anesthesia Type: general ASA Status: 3          Anesthesia Type: No value filed.     Abdelrahman Phase I: Abdelrahman Score: 10    Abdelrahman Phase II: Abdelrahman Score: 10      Anesthesia Post Evaluation    Patient location during evaluation: bedside  Patient participation: complete - patient participated  Level of consciousness: awake and alert  Pain score: 1  Airway patency: patent  Nausea & Vomiting: no vomiting  Complications: no  Cardiovascular status: hemodynamically stable  Respiratory status: nonlabored ventilation  Hydration status: stable  Multimodal analgesia pain management approach  Pain management: adequate

## 2023-11-08 PROBLEM — D12.6 TUBULAR ADENOMA OF COLON: Status: ACTIVE | Noted: 2023-11-08

## 2023-11-24 ENCOUNTER — TELEPHONE (OUTPATIENT)
Dept: FAMILY MEDICINE CLINIC | Age: 63
End: 2023-11-24

## 2023-11-24 DIAGNOSIS — Z01.818 PREOP EXAMINATION: Primary | ICD-10-CM

## 2023-11-24 NOTE — TELEPHONE ENCOUNTER
Patient called and is coming in for his pre op on 11/27/2023 and needed to know if he needs to have his A1c checked   And if he needs to have his other labs done     Please my chart patient to let him know if he needs to fast     He has also said  he needs to talk about his Testerone shots he wants to do himself and will need help with showing how to do them

## 2023-11-27 ENCOUNTER — OFFICE VISIT (OUTPATIENT)
Dept: FAMILY MEDICINE CLINIC | Age: 63
End: 2023-11-27
Payer: COMMERCIAL

## 2023-11-27 VITALS
SYSTOLIC BLOOD PRESSURE: 148 MMHG | OXYGEN SATURATION: 96 % | HEART RATE: 76 BPM | BODY MASS INDEX: 50.45 KG/M2 | DIASTOLIC BLOOD PRESSURE: 88 MMHG | WEIGHT: 315 LBS

## 2023-11-27 DIAGNOSIS — Z01.818 PRE-OP EXAM: Primary | ICD-10-CM

## 2023-11-27 DIAGNOSIS — E03.8 SUBCLINICAL HYPOTHYROIDISM: ICD-10-CM

## 2023-11-27 DIAGNOSIS — E78.00 PURE HYPERCHOLESTEROLEMIA: ICD-10-CM

## 2023-11-27 DIAGNOSIS — I26.09 ACUTE PULMONARY EMBOLISM WITH ACUTE COR PULMONALE, UNSPECIFIED PULMONARY EMBOLISM TYPE (HCC): ICD-10-CM

## 2023-11-27 DIAGNOSIS — E66.01 MORBID OBESITY DUE TO EXCESS CALORIES (HCC): ICD-10-CM

## 2023-11-27 DIAGNOSIS — E11.21 TYPE 2 DIABETES MELLITUS WITH DIABETIC NEPHROPATHY, WITHOUT LONG-TERM CURRENT USE OF INSULIN (HCC): ICD-10-CM

## 2023-11-27 DIAGNOSIS — I10 ESSENTIAL HYPERTENSION: ICD-10-CM

## 2023-11-27 DIAGNOSIS — K76.0 FATTY LIVER: ICD-10-CM

## 2023-11-27 LAB — HBA1C MFR BLD: 6.3 %

## 2023-11-27 PROCEDURE — 83036 HEMOGLOBIN GLYCOSYLATED A1C: CPT | Performed by: FAMILY MEDICINE

## 2023-11-27 PROCEDURE — 3077F SYST BP >= 140 MM HG: CPT | Performed by: FAMILY MEDICINE

## 2023-11-27 PROCEDURE — 3044F HG A1C LEVEL LT 7.0%: CPT | Performed by: FAMILY MEDICINE

## 2023-11-27 PROCEDURE — 3079F DIAST BP 80-89 MM HG: CPT | Performed by: FAMILY MEDICINE

## 2023-11-27 PROCEDURE — 93000 ELECTROCARDIOGRAM COMPLETE: CPT | Performed by: FAMILY MEDICINE

## 2023-11-27 PROCEDURE — 99214 OFFICE O/P EST MOD 30 MIN: CPT | Performed by: FAMILY MEDICINE

## 2023-11-27 NOTE — PROGRESS NOTES
Canals clear. TM's normal. Hearing grossly intact to finger rub. Nose/Sinuses - Nares normal. Septum midline. Mucosa normal. No drainage or sinus tenderness. Oropharynx - Lips, mucosa, and tongue normal. Teeth and gums normal. Orop  Neck - Neck supple. No adenopathy. Thyroid symmetric, normal size. No bruits. Back - Back symmetric, no curvature. ROM normal. No CVA tenderness. Lungs - Percussion normal. Good diaphragmatic excursion. Lungs clear  Heart - Regular rate and rhythm, with no rub, murmur or gallop noted. Abdomen - Abdomen soft, non-tender. BS normal. No masses, organomegaly  Extremities - Extremities normal. No deformities, edema, or skin discolora  Musculoskeletal - Spine ROM normal. Muscular strength intact. Peripheral pulses - radial=4/4, dorsalis pedis=4/4,  Neuro - Gait normal. Reflexes normal and symmetric. Sensation grossly normal.  No focal weakness    Hemoglobin A1C   Date Value Ref Range Status   06/08/2023 6.7 See comment % Final     Comment:     Comment:  Diagnosis of Diabetes: > or = 6.5%  Increased risk of diabetes (Prediabetes): 5.7-6.4%  Glycemic Control: Nonpregnant Adults: <7.0%                    Pregnant: <6.0%          Lab Results   Component Value Date    TSH 2.88 06/08/2023    TSHREFLEX 1.72 11/21/2018    TSHFT4 1.57 02/27/2017      Lab Results   Component Value Date/Time     07/07/2023 05:30 AM    K 4.1 07/07/2023 05:30 AM    K 4.1 07/03/2023 09:42 PM     07/07/2023 05:30 AM    CO2 20 07/07/2023 05:30 AM    BUN 14 07/07/2023 05:30 AM    CREATININE 0.9 07/07/2023 05:30 AM    GLUCOSE 124 07/07/2023 05:30 AM    GLUCOSE 103 05/16/2011 12:00 AM    CALCIUM 8.6 07/07/2023 05:30 AM    LABGLOM >60 07/07/2023 05:30 AM   EKG - nl    A1c - 6.3  ASSESSMENT:  OA left hip   Encounter Diagnoses   Name Primary?     Pre-op exam Yes    Type 2 diabetes mellitus with diabetic nephropathy, without long-term current use of insulin ([address])     Morbid obesity due to excess calories ([address])

## 2024-06-05 DIAGNOSIS — E03.8 SUBCLINICAL HYPOTHYROIDISM: ICD-10-CM

## 2024-06-05 RX ORDER — LEVOTHYROXINE SODIUM 0.07 MG/1
TABLET ORAL
Qty: 90 TABLET | Refills: 1 | Status: SHIPPED | OUTPATIENT
Start: 2024-06-05

## 2024-06-05 NOTE — TELEPHONE ENCOUNTER
Lov 11/27/23  Lrf  90 3 6/12/23 Medication:   Requested Prescriptions     Pending Prescriptions Disp Refills    levothyroxine (SYNTHROID) 75 MCG tablet [Pharmacy Med Name: L-THYROXINE (SYNTHROID) TABS 75MCG] 90 tablet 3     Sig: TAKE 1 TABLET EVERY MORNING (NO FURTHER REFILLS. NEED APPOINTMENT AS SOON AS POSSIBLE)       Last Filled:      Patient Phone Number: [phone] (home) [phone] (work)    Last appt: 11/27/2023   Next appt: Visit date not found    Last Thyroid:   Lab Results   Component Value Date/Time    TSH 2.88 06/08/2023 12:00 PM    TSH 1.72 11/21/2018 12:11 PM    T4FREE 1.2 10/21/2016 08:49 AM    D7GQGRA 9.2 05/16/2011 12:00 AM

## 2024-10-07 DIAGNOSIS — I10 ESSENTIAL HYPERTENSION: ICD-10-CM

## 2024-10-07 RX ORDER — LOSARTAN POTASSIUM AND HYDROCHLOROTHIAZIDE 25; 100 MG/1; MG/1
TABLET ORAL
Qty: 90 TABLET | Refills: 0 | Status: SHIPPED | OUTPATIENT
Start: 2024-10-07

## 2024-10-07 NOTE — TELEPHONE ENCOUNTER
Lov 11/27/23  Lrf 90 3 7/27/23 Medication:   Requested Prescriptions     Pending Prescriptions Disp Refills    losartan-hydroCHLOROthiazide (HYZAAR) 100-25 MG per tablet [Pharmacy Med Name: LOSARTAN/ HYDROCHLOROTHIAZIDE TABS 100/25MG] 90 tablet 3     Sig: TAKE 1 TABLET EVERY MORNING ( NO FURTHER REFILLS NEED APPOINTMENT ASAP )       Last Filled:      Patient Phone Number: [phone] (home) [phone] (work)    Last appt: 11/27/2023   Next appt: Visit date not found    Lab Results   Component Value Date     07/07/2023    K 4.1 07/07/2023     07/07/2023    CO2 20 (L) 07/07/2023    BUN 14 07/07/2023    CREATININE 0.9 07/07/2023    GLUCOSE 124 (H) 07/07/2023    CALCIUM 8.6 07/07/2023    BILITOT 1.1 (H) 07/05/2023    ALKPHOS 56 07/05/2023    AST 16 07/05/2023    ALT 29 07/05/2023    LABGLOM >60 07/07/2023    GFRAA >60 01/05/2022    AGRATIO 1.6 07/05/2023    GLOB 2.6 06/29/2021

## 2024-10-14 DIAGNOSIS — I10 ESSENTIAL HYPERTENSION: ICD-10-CM

## 2024-10-14 RX ORDER — AMLODIPINE BESYLATE 10 MG/1
10 TABLET ORAL DAILY
Qty: 90 TABLET | Refills: 0 | Status: SHIPPED | OUTPATIENT
Start: 2024-10-14

## 2024-10-14 NOTE — TELEPHONE ENCOUNTER
Lov 11/27/23  Lrf 90 3 10/19/23 Medication:   Requested Prescriptions     Pending Prescriptions Disp Refills    amLODIPine (NORVASC) 10 MG tablet [Pharmacy Med Name: AMLODIPINE BESYLATE TABS 10MG] 90 tablet 3     Sig: TAKE 1 TABLET DAILY       Last Filled:      Patient Phone Number: [phone] (home) [phone] (work)    Last appt: 11/27/2023   Next appt: Visit date not found    Lab Results   Component Value Date     07/07/2023    K 4.1 07/07/2023     07/07/2023    CO2 20 (L) 07/07/2023    BUN 14 07/07/2023    CREATININE 0.9 07/07/2023    GLUCOSE 124 (H) 07/07/2023    CALCIUM 8.6 07/07/2023    BILITOT 1.1 (H) 07/05/2023    ALKPHOS 56 07/05/2023    AST 16 07/05/2023    ALT 29 07/05/2023    LABGLOM >60 07/07/2023    GFRAA >60 01/05/2022    AGRATIO 1.6 07/05/2023    GLOB 2.6 06/29/2021

## 2024-12-02 DIAGNOSIS — E03.8 SUBCLINICAL HYPOTHYROIDISM: ICD-10-CM

## 2024-12-03 RX ORDER — LEVOTHYROXINE SODIUM 75 UG/1
TABLET ORAL
Qty: 90 TABLET | Refills: 0 | OUTPATIENT
Start: 2024-12-03

## 2024-12-03 NOTE — TELEPHONE ENCOUNTER
Lov 11/27/23  Lrf 90 1 6/5/24Medication:   Requested Prescriptions     Pending Prescriptions Disp Refills    levothyroxine (SYNTHROID) 75 MCG tablet [Pharmacy Med Name: L-THYROXINE (SYNTHROID) TABS 75MCG] 90 tablet 3     Sig: TAKE 1 TABLET EVERY MORNING (NO FURTHER REFILLS. NEED APPOINTMENT AS SOON AS POSSIBLE)       Last Filled:      Patient Phone Number: [phone] (home) [phone] (work)    Last appt: 11/27/2023   Next appt: Visit date not found    Last Thyroid:   Lab Results   Component Value Date/Time    TSH 2.88 06/08/2023 12:00 PM    TSH 1.72 11/21/2018 12:11 PM    T4FREE 1.2 10/21/2016 08:49 AM

## 2025-01-06 DIAGNOSIS — I10 ESSENTIAL HYPERTENSION: ICD-10-CM

## 2025-01-06 RX ORDER — LOSARTAN POTASSIUM AND HYDROCHLOROTHIAZIDE 25; 100 MG/1; MG/1
TABLET ORAL
Qty: 90 TABLET | Refills: 3 | Status: SHIPPED | OUTPATIENT
Start: 2025-01-06

## 2025-01-15 DIAGNOSIS — I10 ESSENTIAL HYPERTENSION: ICD-10-CM

## 2025-01-15 RX ORDER — AMLODIPINE BESYLATE 10 MG/1
10 TABLET ORAL DAILY
Qty: 90 TABLET | Refills: 0 | Status: SHIPPED | OUTPATIENT
Start: 2025-01-15

## 2025-01-15 NOTE — TELEPHONE ENCOUNTER
Lov 11/27/23  Lrf 90 0 10/14/24 Medication:   Requested Prescriptions     Pending Prescriptions Disp Refills    amLODIPine (NORVASC) 10 MG tablet [Pharmacy Med Name: AMLODIPINE BESYLATE TABS 10MG] 90 tablet 3     Sig: TAKE 1 TABLET DAILY       Last Filled:      Patient Phone Number: [phone] (home) [phone] (work)    Last appt: 11/27/2023   Next appt: 1/23/2025    Lab Results   Component Value Date     07/07/2023    K 4.1 07/07/2023     07/07/2023    CO2 20 (L) 07/07/2023    BUN 14 07/07/2023    CREATININE 0.9 07/07/2023    GLUCOSE 124 (H) 07/07/2023    CALCIUM 8.6 07/07/2023    BILITOT 1.1 (H) 07/05/2023    ALKPHOS 56 07/05/2023    AST 16 07/05/2023    ALT 29 07/05/2023    LABGLOM >60 07/07/2023    GFRAA >60 01/05/2022    AGRATIO 1.6 07/05/2023    GLOB 2.6 06/29/2021             
none

## 2025-01-22 ASSESSMENT — PATIENT HEALTH QUESTIONNAIRE - PHQ9
SUM OF ALL RESPONSES TO PHQ QUESTIONS 1-9: 0
1. LITTLE INTEREST OR PLEASURE IN DOING THINGS: NOT AT ALL
1. LITTLE INTEREST OR PLEASURE IN DOING THINGS: NOT AT ALL
SUM OF ALL RESPONSES TO PHQ9 QUESTIONS 1 & 2: 0
SUM OF ALL RESPONSES TO PHQ9 QUESTIONS 1 & 2: 0
SUM OF ALL RESPONSES TO PHQ QUESTIONS 1-9: 0
2. FEELING DOWN, DEPRESSED OR HOPELESS: NOT AT ALL
2. FEELING DOWN, DEPRESSED OR HOPELESS: NOT AT ALL
SUM OF ALL RESPONSES TO PHQ QUESTIONS 1-9: 0
SUM OF ALL RESPONSES TO PHQ QUESTIONS 1-9: 0

## 2025-01-22 NOTE — PROGRESS NOTES
Chief Complaint:   Paramjit Amanda is a 65 y.o. 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    Herniated lumbar intervertebral disc    Primary osteoarthritis of left hip    Fatty liver    Acute pulmonary embolism with acute cor pulmonale (HCC)    Acute respiratory failure with hypoxia    Pneumonia due to COVID-19 virus    Lactic acidosis    Elevated ALT measurement    Elevated d-dimer    Type 2 diabetes mellitus with diabetic nephropathy, without long-term current use of insulin (HCC)    Ileus (HCC)    Coronary artery calcification    Tubular adenoma of colon     Past Medical History:   Diagnosis Date    Bone marrow donor     x 2    COVID     Fatty liver 11/16/2021    HTN (hypertension) 05/23/2012    Hyperlipidemia     Hypertension     Lumbar herniated disc     x 3    Obesity     Pre-diabetes 06/04/2014    Subclinical hypothyroidism 06/04/2014    Tubular adenoma of colon 11/8/2023    Type 2 diabetes mellitus with diabetic nephropathy, without long-term current use of insulin (HCC) 06/11/2023       Past Surgical History:   Procedure Laterality Date    COLONOSCOPY  01/16/2012    COLONOSCOPY  10/14/2014    COLONOSCOPY N/A 11/6/2023    COLONOSCOPY POLYPECTOMY SNARE/COLD BIOPSY performed by Johnathan Katz MD at [facility] END[facility]    KNEE SURGERY Left     OTHER SURGICAL HISTORY      epidural injection x 2    TOTAL HIP ARTHROPLASTY Right 2017    [facility], Dr Langenderfer       Current

## 2025-01-23 ENCOUNTER — OFFICE VISIT (OUTPATIENT)
Dept: FAMILY MEDICINE CLINIC | Age: 65
End: 2025-01-23
Payer: COMMERCIAL

## 2025-01-23 VITALS
HEIGHT: 72 IN | OXYGEN SATURATION: 98 % | SYSTOLIC BLOOD PRESSURE: 120 MMHG | HEART RATE: 72 BPM | BODY MASS INDEX: 42.66 KG/M2 | WEIGHT: 315 LBS | DIASTOLIC BLOOD PRESSURE: 76 MMHG

## 2025-01-23 DIAGNOSIS — I10 ESSENTIAL HYPERTENSION: ICD-10-CM

## 2025-01-23 DIAGNOSIS — E11.21 TYPE 2 DIABETES MELLITUS WITH DIABETIC NEPHROPATHY, WITHOUT LONG-TERM CURRENT USE OF INSULIN (HCC): ICD-10-CM

## 2025-01-23 DIAGNOSIS — E78.00 PURE HYPERCHOLESTEROLEMIA: ICD-10-CM

## 2025-01-23 DIAGNOSIS — Z00.00 WELL ADULT EXAM: Primary | ICD-10-CM

## 2025-01-23 DIAGNOSIS — E66.01 MORBID OBESITY DUE TO EXCESS CALORIES: ICD-10-CM

## 2025-01-23 DIAGNOSIS — R79.89 LOW TESTOSTERONE: ICD-10-CM

## 2025-01-23 DIAGNOSIS — E03.8 SUBCLINICAL HYPOTHYROIDISM: ICD-10-CM

## 2025-01-23 DIAGNOSIS — Z00.00 PHYSICAL EXAM: ICD-10-CM

## 2025-01-23 DIAGNOSIS — I25.10 CORONARY ARTERY CALCIFICATION: ICD-10-CM

## 2025-01-23 LAB
BILIRUBIN, POC: NEGATIVE
BLOOD URINE, POC: NEGATIVE
CLARITY, POC: CLEAR
COLOR, POC: NORMAL
CREAT UR-MCNC: 212 MG/DL (ref 39–259)
GLUCOSE URINE, POC: NEGATIVE MG/DL
HBA1C MFR BLD: 7.4 %
KETONES, POC: NEGATIVE MG/DL
LEUKOCYTE EST, POC: NEGATIVE
MICROALBUMIN UR DL<=1MG/L-MCNC: 12.2 MG/DL
MICROALBUMIN/CREAT UR: 57.5 MG/G (ref 0–30)
NITRITE, POC: NEGATIVE
PH, POC: 5.5
PROTEIN, POC: 30 MG/DL
SPECIFIC GRAVITY, POC: 1.02
UROBILINOGEN, POC: 1 MG/DL

## 2025-01-23 PROCEDURE — 3078F DIAST BP <80 MM HG: CPT | Performed by: FAMILY MEDICINE

## 2025-01-23 PROCEDURE — 3074F SYST BP LT 130 MM HG: CPT | Performed by: FAMILY MEDICINE

## 2025-01-23 PROCEDURE — 99396 PREV VISIT EST AGE 40-64: CPT | Performed by: FAMILY MEDICINE

## 2025-01-23 PROCEDURE — 83036 HEMOGLOBIN GLYCOSYLATED A1C: CPT | Performed by: FAMILY MEDICINE

## 2025-01-23 PROCEDURE — 81002 URINALYSIS NONAUTO W/O SCOPE: CPT | Performed by: FAMILY MEDICINE

## 2025-01-23 RX ORDER — AMLODIPINE BESYLATE 10 MG/1
10 TABLET ORAL DAILY
Qty: 90 TABLET | Refills: 3 | Status: SHIPPED | OUTPATIENT
Start: 2025-01-23

## 2025-01-23 RX ORDER — LEVOTHYROXINE SODIUM 75 UG/1
TABLET ORAL
Qty: 90 TABLET | Refills: 3 | Status: SHIPPED | OUTPATIENT
Start: 2025-01-23

## 2025-01-23 SDOH — ECONOMIC STABILITY: FOOD INSECURITY: WITHIN THE PAST 12 MONTHS, YOU WORRIED THAT YOUR FOOD WOULD RUN OUT BEFORE YOU GOT MONEY TO BUY MORE.: NEVER TRUE

## 2025-01-23 SDOH — ECONOMIC STABILITY: FOOD INSECURITY: WITHIN THE PAST 12 MONTHS, THE FOOD YOU BOUGHT JUST DIDN'T LAST AND YOU DIDN'T HAVE MONEY TO GET MORE.: NEVER TRUE

## 2025-01-29 RX ORDER — METFORMIN HYDROCHLORIDE 500 MG/1
500 TABLET, EXTENDED RELEASE ORAL 2 TIMES DAILY WITH MEALS
Qty: 180 TABLET | Refills: 1 | Status: SHIPPED | OUTPATIENT
Start: 2025-01-29

## 2025-03-18 DIAGNOSIS — Z00.00 PHYSICAL EXAM: ICD-10-CM

## 2025-03-18 LAB
ALBUMIN SERPL-MCNC: 4.5 G/DL (ref 3.4–5)
ALBUMIN/GLOB SERPL: 1.9 {RATIO} (ref 1.1–2.2)
ALP SERPL-CCNC: 53 U/L (ref 40–129)
ALT SERPL-CCNC: 66 U/L (ref 10–40)
ANION GAP SERPL CALCULATED.3IONS-SCNC: 13 MMOL/L (ref 3–16)
AST SERPL-CCNC: 40 U/L (ref 15–37)
BASOPHILS # BLD: 0 K/UL (ref 0–0.2)
BASOPHILS NFR BLD: 0.6 %
BILIRUB SERPL-MCNC: 0.7 MG/DL (ref 0–1)
BUN SERPL-MCNC: 27 MG/DL (ref 7–20)
CALCIUM SERPL-MCNC: 9.6 MG/DL (ref 8.3–10.6)
CHLORIDE SERPL-SCNC: 101 MMOL/L (ref 99–110)
CHOLEST SERPL-MCNC: 192 MG/DL (ref 0–199)
CO2 SERPL-SCNC: 26 MMOL/L (ref 21–32)
CREAT SERPL-MCNC: 1.1 MG/DL (ref 0.8–1.3)
DEPRECATED RDW RBC AUTO: 12.8 % (ref 12.4–15.4)
EOSINOPHIL # BLD: 0.2 K/UL (ref 0–0.6)
EOSINOPHIL NFR BLD: 5.3 %
GFR SERPLBLD CREATININE-BSD FMLA CKD-EPI: 75 ML/MIN/{1.73_M2}
GLUCOSE P FAST SERPL-MCNC: 113 MG/DL (ref 70–99)
HCT VFR BLD AUTO: 44.6 % (ref 40.5–52.5)
HDLC SERPL-MCNC: 31 MG/DL (ref 40–60)
HGB BLD-MCNC: 15.2 G/DL (ref 13.5–17.5)
LDL CHOLESTEROL: 129 MG/DL
LYMPHOCYTES # BLD: 1 K/UL (ref 1–5.1)
LYMPHOCYTES NFR BLD: 23.5 %
MCH RBC QN AUTO: 30.9 PG (ref 26–34)
MCHC RBC AUTO-ENTMCNC: 34.2 G/DL (ref 31–36)
MCV RBC AUTO: 90.2 FL (ref 80–100)
MONOCYTES # BLD: 0.4 K/UL (ref 0–1.3)
MONOCYTES NFR BLD: 8.3 %
NEUTROPHILS # BLD: 2.7 K/UL (ref 1.7–7.7)
NEUTROPHILS NFR BLD: 62.3 %
PLATELET # BLD AUTO: 217 K/UL (ref 135–450)
PMV BLD AUTO: 8.1 FL (ref 5–10.5)
POTASSIUM SERPL-SCNC: 4.7 MMOL/L (ref 3.5–5.1)
PROT SERPL-MCNC: 6.9 G/DL (ref 6.4–8.2)
PSA SERPL DL<=0.01 NG/ML-MCNC: 0.68 NG/ML (ref 0–4)
RBC # BLD AUTO: 4.94 M/UL (ref 4.2–5.9)
SODIUM SERPL-SCNC: 140 MMOL/L (ref 136–145)
TRIGL SERPL-MCNC: 161 MG/DL (ref 0–150)
TSH SERPL DL<=0.005 MIU/L-ACNC: 2.01 UIU/ML (ref 0.27–4.2)
VLDLC SERPL CALC-MCNC: 32 MG/DL
WBC # BLD AUTO: 4.3 K/UL (ref 4–11)

## 2025-03-20 ENCOUNTER — RESULTS FOLLOW-UP (OUTPATIENT)
Dept: FAMILY MEDICINE CLINIC | Age: 65
End: 2025-03-20

## 2025-03-20 LAB — TESTOST SERPL-MCNC: 193 NG/DL (ref 193–740)

## (undated) DEVICE — SNARE COLD DIAMOND 15MM THIN

## (undated) DEVICE — VALVE SUCTION AIR H2O SET ORCA POD + DISP

## (undated) DEVICE — BW-412T DISP COMBO CLEANING BRUSH: Brand: SINGLE USE COMBINATION CLEANING BRUSH

## (undated) DEVICE — SOLUTION IV IRRIG WATER 500ML POUR BRL ST 2F7113

## (undated) DEVICE — AIR/WATER CLEANING ADAPTER FOR OLYMPUS® GI ENDOSCOPE: Brand: BULLDOG®

## (undated) DEVICE — TRAP SPEC RETRV CLR PLAS POLYP IN LN SUCT QUIK CTCH

## (undated) DEVICE — ENDOSCOPIC KIT 6X3/16 FT COLON W/ 1.1 OZ 2 GWN W/O BRSH